# Patient Record
Sex: FEMALE | Race: WHITE | NOT HISPANIC OR LATINO | Employment: OTHER | URBAN - METROPOLITAN AREA
[De-identification: names, ages, dates, MRNs, and addresses within clinical notes are randomized per-mention and may not be internally consistent; named-entity substitution may affect disease eponyms.]

---

## 2021-02-09 ENCOUNTER — OFFICE VISIT (OUTPATIENT)
Dept: ORTHOPEDIC SURGERY | Facility: CLINIC | Age: 56
End: 2021-02-09

## 2021-02-09 VITALS — OXYGEN SATURATION: 97 % | HEIGHT: 61 IN | WEIGHT: 209 LBS | BODY MASS INDEX: 39.46 KG/M2 | HEART RATE: 103 BPM

## 2021-02-09 DIAGNOSIS — M16.12 PRIMARY OSTEOARTHRITIS OF LEFT HIP: Primary | ICD-10-CM

## 2021-02-09 DIAGNOSIS — E66.09 CLASS 2 OBESITY DUE TO EXCESS CALORIES WITHOUT SERIOUS COMORBIDITY WITH BODY MASS INDEX (BMI) OF 39.0 TO 39.9 IN ADULT: ICD-10-CM

## 2021-02-09 DIAGNOSIS — M25.552 LEFT HIP PAIN: ICD-10-CM

## 2021-02-09 PROCEDURE — 99204 OFFICE O/P NEW MOD 45 MIN: CPT | Performed by: ORTHOPAEDIC SURGERY

## 2021-02-09 RX ORDER — CETIRIZINE HYDROCHLORIDE 10 MG/1
10 TABLET ORAL DAILY
COMMUNITY

## 2021-02-09 RX ORDER — VILAZODONE HYDROCHLORIDE 40 MG/1
40 TABLET ORAL DAILY
COMMUNITY
Start: 2020-12-08 | End: 2023-01-12 | Stop reason: SDUPTHER

## 2021-02-09 RX ORDER — ARIPIPRAZOLE 2 MG/1
2 TABLET ORAL DAILY
COMMUNITY
Start: 2020-11-23 | End: 2022-11-09 | Stop reason: SDUPTHER

## 2021-02-09 RX ORDER — LEVOTHYROXINE SODIUM 0.05 MG/1
50 TABLET ORAL DAILY
COMMUNITY
Start: 2020-11-13 | End: 2022-12-06 | Stop reason: SDUPTHER

## 2021-02-09 RX ORDER — MONTELUKAST SODIUM 10 MG/1
10 TABLET ORAL DAILY
COMMUNITY
Start: 2021-01-07 | End: 2022-12-21 | Stop reason: SDUPTHER

## 2021-02-09 RX ORDER — ATORVASTATIN CALCIUM 40 MG/1
40 TABLET, FILM COATED ORAL DAILY
COMMUNITY
Start: 2020-12-29 | End: 2022-11-02 | Stop reason: SDUPTHER

## 2021-02-09 RX ORDER — DULOXETIN HYDROCHLORIDE 60 MG/1
60 CAPSULE, DELAYED RELEASE ORAL DAILY
COMMUNITY
Start: 2021-02-01 | End: 2022-11-02 | Stop reason: SDUPTHER

## 2021-02-09 RX ORDER — PHENOL 1.4 %
30 AEROSOL, SPRAY (ML) MUCOUS MEMBRANE NIGHTLY
COMMUNITY

## 2021-02-09 RX ORDER — OMEPRAZOLE 20 MG/1
20 CAPSULE, DELAYED RELEASE ORAL DAILY
COMMUNITY

## 2021-02-09 RX ORDER — ZOLPIDEM TARTRATE 10 MG/1
10 TABLET ORAL NIGHTLY
COMMUNITY
Start: 2021-01-17 | End: 2022-07-12

## 2021-02-09 RX ORDER — AMLODIPINE BESYLATE 2.5 MG/1
2.5 TABLET ORAL DAILY
COMMUNITY
Start: 2020-11-13 | End: 2023-03-29 | Stop reason: SDUPTHER

## 2021-02-09 RX ORDER — POTASSIUM CHLORIDE 750 MG/1
10 CAPSULE, EXTENDED RELEASE ORAL DAILY
COMMUNITY
Start: 2021-01-10 | End: 2022-11-11 | Stop reason: SDUPTHER

## 2021-02-09 RX ORDER — GUAIFENESIN 600 MG/1
1200 TABLET, EXTENDED RELEASE ORAL 2 TIMES DAILY PRN
COMMUNITY

## 2021-02-09 NOTE — PROGRESS NOTES
Orthopaedic Clinic Note: Hip New Patient    Chief Complaint   Patient presents with   • Left Hip - Pain        HPI    Robina Dumont is a 55 y.o. female who presents with left hip pain for 1 year(s). Onset atraumatic and gradual in nature. Pain is localized to groin and lateral trochanter and is a 4/10 on the pain scale.Pain is described as dull, aching, burning, throbbing, stabbing and shooting. Associated symptoms include pain, popping and stiffness.  The pain is worse with walking and working; resting improve the pain. Previous treatments have included: Tylenol since symptom onset. Although some transient relief was reported with these interventions, these conservative measures have failed and symptoms have persisted. The patient is limited in daily activities and has had a significant decrease in quality of life as a result. She admits to  night sweats.    I have reviewed the following portions of the patient's history:History of Present Illness    History reviewed. No pertinent past medical history.   Past Surgical History:   Procedure Laterality Date   • BLEPHAROPLASTY Bilateral    • CATARACT EXTRACTION, BILATERAL     • CERVIX LESION DESTRUCTION     • FINGER/THUMB ARTHROPLASTY Bilateral    • GASTRIC BYPASS     • KNEE ARTHROSCOPY Right       Family History   Problem Relation Age of Onset   • Heart attack Mother    • Hypertension Mother    • Cancer Mother    • Diabetes Father    • Heart attack Father    • Hypertension Father    • Diabetes Paternal Grandmother      Social History     Socioeconomic History   • Marital status:      Spouse name: Not on file   • Number of children: Not on file   • Years of education: Not on file   • Highest education level: Not on file   Tobacco Use   • Smoking status: Never Smoker   • Smokeless tobacco: Never Used   Substance and Sexual Activity   • Alcohol use: Yes     Comment: rarely   • Drug use: Never   • Sexual activity: Defer      Current Outpatient Medications on File  Prior to Visit   Medication Sig Dispense Refill   • amLODIPine (NORVASC) 2.5 MG tablet Take 2.5 mg by mouth Daily.     • ARIPiprazole (ABILIFY) 2 MG tablet Take 2 mg by mouth Daily.     • atorvastatin (LIPITOR) 40 MG tablet Take 40 mg by mouth Daily.     • cetirizine (zyrTEC) 10 MG tablet Take 10 mg by mouth Daily.     • DULoxetine (CYMBALTA) 60 MG capsule      • fluticasone-salmeterol (ADVAIR) 500-50 MCG/DOSE DISKUS      • guaiFENesin (MUCINEX) 600 MG 12 hr tablet Take 1,200 mg by mouth 2 (Two) Times a Day.     • levothyroxine (SYNTHROID, LEVOTHROID) 50 MCG tablet TAKE 1 TABLET BY MOUTH ONCE DAILY IN THE MORNING ON AN EMPTY STOMACH     • melatonin 5 MG tablet tablet Take 10 mg by mouth.     • montelukast (SINGULAIR) 10 MG tablet Take 10 mg by mouth Daily.     • omeprazole (priLOSEC) 20 MG capsule Take 20 mg by mouth Daily.     • potassium chloride (MICRO-K) 10 MEQ CR capsule Take 10 mEq by mouth Daily.     • Viibryd 40 MG tablet tablet Take 40 mg by mouth Daily.     • zolpidem (AMBIEN) 10 MG tablet        No current facility-administered medications on file prior to visit.       Allergies   Allergen Reactions   • Erythromycin GI Intolerance   • Prednisone Other (See Comments)     Hypertension          Review of Systems   Constitutional: Positive for diaphoresis and fatigue.   HENT: Positive for sinus pressure and sneezing.    Eyes: Positive for itching.   Respiratory: Negative.    Cardiovascular: Negative.    Gastrointestinal: Negative.    Endocrine: Negative.    Genitourinary: Positive for urgency.   Musculoskeletal: Positive for arthralgias and back pain.   Skin: Negative.    Allergic/Immunologic: Positive for environmental allergies.   Neurological: Positive for headaches.   Hematological: Negative.    Psychiatric/Behavioral: Positive for sleep disturbance.        Depression        The patient's Review of Systems was personally reviewed and confirmed as accurate.    The following portions of the patient's  "history were reviewed and updated as appropriate: allergies, current medications, past family history, past medical history, past social history, past surgical history and problem list.    Physical Exam  Pulse 103, height 154.9 cm (61\"), weight 94.8 kg (209 lb), SpO2 97 %.    Body mass index is 39.49 kg/m².    GENERAL APPEARANCE: awake, alert & oriented x 3, in no acute distress, well developed, well nourished and obese  PSYCH: normal affect  LUNGS:  breathing nonlabored  EYES: sclera anicteric  CARDIOVASCULAR: palpable dorsalis pedis, palpable posterior tibial bilaterally. Capillary refill less than 2 seconds  EXTREMITIES: no clubbing, cyanosis  GAIT:  Antalgic           Right Hip Exam:  RANGE OF MOTION:   FLEXION CONTRACTURE: None   FLEXION: 110 degrees   INTERNAL ROTATION: 20 degrees at 90 degrees of flexion   EXTERNAL ROTATION: 40 degrees at 90 degrees of flexion    PAIN WITH HIP MOTION: no  PAIN WITH LOGROLL: no  STINCHFIELD TEST: negative    KNEE EXAM: full knee ROM (0-120 degrees), stable to varus and valgus stress at terminal extension and 30 degrees flexion     STRENGTH:  5/5 hip adduction, abduction, flexion. 5/5 strength knee flexion, extension. 5/5 strength ankle dorsiflexion and plantarflexion.     GREATER TROCHANTER BURSAL PAIN:  no     REFLEXES:   PATELLAR 2+/4   ACHILLES 2+/4    CLONUS: negative  STRAIGHT LEG TEST:   negative    SENSATION TO LIGHT TOUCH:  DEEP PERONEAL/SUPERFICIAL PERONEAL/SURAL/SAPHENOUS/TIBIAL:  intact    EDEMA:   no  ERYTHEMA:  no  WOUNDS/INCISIONS: no overlying skin problems.      Left Hip Exam:   RANGE OF MOTION:   FLEXION CONTRACTURE: None   FLEXION: 110 degrees   INTERNAL ROTATION: 20 degrees at 90 degrees of flexion   EXTERNAL ROTATION: 40 degrees at 90 degrees of flexion    PAIN WITH HIP MOTION: yes  PAIN WITH LOGROLL: no  STINCHFIELD TEST: Positive    KNEE EXAM: full knee ROM (0-120 degrees), stable to varus and valgus stress at terminal extension and 30 degrees flexion "     STRENGTH:  5/5 hip adduction, abduction, flexion. 5/5 strength knee flexion, extension. 5/5 strength ankle dorsiflexion and plantarflexion.     GREATER TROCHANTER BURSAL PAIN:  no     REFLEXES:   PATELLAR 2+/4   ACHILLES 2+/4    CLONUS: negative  STRAIGHT LEG TEST:   negative    SENSATION TO LIGHT TOUCH:  DEEP PERONEAL/SUPERFICIAL PERONEAL/SURAL/SAPHENOUS/TIBIAL:  intact    EDEMA:   no  ERYTHEMA:  no  WOUNDS/INCISIONS: no overlying skin problems.      ------------------------------------------------------------------    LEG LENGTHS:  equal  _____________________________________________________  _____________________________________________________    RADIOGRAPHIC FINDINGS:   Indication: Left hip pain    Comparison: No prior xrays are available for comparison    AP pelvis, hip 2 views: Right: mild joint space narrowing, minimal osteophyte formation; Left: advanced, end-stage osteoarthritis with bone on bone articulation, subchondral sclerosis, and subchondral cysts, there are marginal osteophytes visualized at the femoral head-neck junction and acetabular margins    Assessment/Plan:   Diagnosis Plan   1. Primary osteoarthritis of left hip  [place] Guide For Pain Meds Inj   2. Left hip pain  XR Hip With or Without Pelvis 2 - 3 View Left   3. Class 2 obesity due to excess calories without serious comorbidity with body mass index (BMI) of 39.0 to 39.9 in adult       Patient has left hip arthritis.  I discussed treatment options.  She is also overweight which is contributing to her joint pain.  Given the preservation of her joint range of motion as well as her relative young age, I discussed proceeding to joint replacement versus attempting cortisone injection to the hip joint.  She is agreeable to the hip joint cortisone injection today.  I explained that this can provide some temporary pain relief and in the interval she can work on weight loss to decrease joint reaction forces.  Weight loss will also improve her  perioperative risks.  She is agreeable to this plan.  She will follow-up in 2 months.    Patient has an elevated BMI greater than 40.  This places the patient at increased risk for perioperative complications as well as increased risk of accelerating the degenerative processes within the joint.  As a result, surgical intervention will be deferred until the patient can achieve a BMI less than 40.  In the interval, the patient has been instructed on weight loss avenues including diet, portion control, calorie restriction, low/no impact exercise, referral to weight loss management and/or bariatric surgery.  It was explained that weight loss can improve joint pain alone by decreasing the joint reaction forces.  For every pound of weight change, the knee and hip joints see a 4 to 5 fold multiplier affect.  Given these options, the patient will proceed with diet and low back exercise.      Jeremiah Alcala MD  02/09/21  10:42 EST

## 2021-02-23 ENCOUNTER — HOSPITAL ENCOUNTER (OUTPATIENT)
Dept: GENERAL RADIOLOGY | Facility: HOSPITAL | Age: 56
Discharge: HOME | End: 2021-02-23
Admitting: ORTHOPAEDIC SURGERY

## 2021-02-23 DIAGNOSIS — M16.12 PRIMARY OSTEOARTHRITIS OF LEFT HIP: ICD-10-CM

## 2021-02-23 PROCEDURE — 25010000002 TRIAMCINOLONE PER 10 MG: Performed by: ORTHOPAEDIC SURGERY

## 2021-02-23 PROCEDURE — 77002 NEEDLE LOCALIZATION BY XRAY: CPT

## 2021-02-23 PROCEDURE — 25010000003 LIDOCAINE 1 % SOLUTION: Performed by: RADIOLOGY

## 2021-02-23 PROCEDURE — 25010000002 IOPAMIDOL 61 % SOLUTION: Performed by: ORTHOPAEDIC SURGERY

## 2021-02-23 RX ORDER — LIDOCAINE HYDROCHLORIDE 10 MG/ML
10 INJECTION, SOLUTION INFILTRATION; PERINEURAL ONCE
Status: COMPLETED | OUTPATIENT
Start: 2021-02-23 | End: 2021-02-23

## 2021-02-23 RX ORDER — TRIAMCINOLONE ACETONIDE 40 MG/ML
80 INJECTION, SUSPENSION INTRA-ARTICULAR; INTRAMUSCULAR ONCE
Status: COMPLETED | OUTPATIENT
Start: 2021-02-23 | End: 2021-02-23

## 2021-02-23 RX ORDER — BUPIVACAINE HYDROCHLORIDE 2.5 MG/ML
3 INJECTION, SOLUTION EPIDURAL; INFILTRATION; INTRACAUDAL ONCE
Status: COMPLETED | OUTPATIENT
Start: 2021-02-23 | End: 2021-02-23

## 2021-02-23 RX ORDER — LIDOCAINE HYDROCHLORIDE 10 MG/ML
3 INJECTION, SOLUTION EPIDURAL; INFILTRATION; INTRACAUDAL; PERINEURAL ONCE
Status: COMPLETED | OUTPATIENT
Start: 2021-02-23 | End: 2021-02-23

## 2021-02-23 RX ADMIN — BUPIVACAINE HYDROCHLORIDE 3 ML: 2.5 INJECTION, SOLUTION EPIDURAL; INFILTRATION; INTRACAUDAL; PERINEURAL at 13:38

## 2021-02-23 RX ADMIN — IOPAMIDOL 15 ML: 612 INJECTION, SOLUTION INTRAVENOUS at 13:40

## 2021-02-23 RX ADMIN — LIDOCAINE HYDROCHLORIDE 10 ML: 10 INJECTION, SOLUTION INFILTRATION; PERINEURAL at 13:39

## 2021-02-23 RX ADMIN — TRIAMCINOLONE ACETONIDE 80 MG: 40 INJECTION, SUSPENSION INTRA-ARTICULAR; INTRAMUSCULAR at 13:39

## 2021-02-23 RX ADMIN — LIDOCAINE HYDROCHLORIDE 3 ML: 10 INJECTION, SOLUTION EPIDURAL; INFILTRATION; INTRACAUDAL; PERINEURAL at 13:39

## 2021-03-31 ENCOUNTER — TELEPHONE (OUTPATIENT)
Dept: ORTHOPEDIC SURGERY | Facility: CLINIC | Age: 56
End: 2021-03-31

## 2021-04-01 NOTE — TELEPHONE ENCOUNTER
SPOKE TO PATIENT, MESSAGE BELOW WAS RELAYED TO PATIENT:    Typically do not recommend cortisone injections any more frequent than every 3 months.  Tylenol, ibuprofen can be utilized to help with pain in the interval.     She ultimately needs a total hip arthroplasty.  That can be scheduled anytime.     PATIENT UNDERSTOOD AND WILL COME BACK FOR April APPOINTMENT TO DISCUSS.

## 2021-04-13 ENCOUNTER — OFFICE VISIT (OUTPATIENT)
Dept: ORTHOPEDIC SURGERY | Facility: CLINIC | Age: 56
End: 2021-04-13

## 2021-04-13 VITALS
WEIGHT: 208.8 LBS | BODY MASS INDEX: 39.42 KG/M2 | HEIGHT: 61 IN | SYSTOLIC BLOOD PRESSURE: 128 MMHG | DIASTOLIC BLOOD PRESSURE: 86 MMHG

## 2021-04-13 DIAGNOSIS — E66.09 CLASS 2 OBESITY DUE TO EXCESS CALORIES WITHOUT SERIOUS COMORBIDITY WITH BODY MASS INDEX (BMI) OF 39.0 TO 39.9 IN ADULT: ICD-10-CM

## 2021-04-13 DIAGNOSIS — M16.12 PRIMARY OSTEOARTHRITIS OF LEFT HIP: Primary | ICD-10-CM

## 2021-04-13 PROCEDURE — 99214 OFFICE O/P EST MOD 30 MIN: CPT | Performed by: ORTHOPAEDIC SURGERY

## 2021-04-13 RX ORDER — LOSARTAN POTASSIUM AND HYDROCHLOROTHIAZIDE 25; 100 MG/1; MG/1
1 TABLET ORAL DAILY
COMMUNITY
Start: 2021-02-25 | End: 2022-11-21 | Stop reason: SDUPTHER

## 2021-04-13 RX ORDER — ALBUTEROL SULFATE 90 UG/1
2 POWDER, METERED RESPIRATORY (INHALATION) EVERY 6 HOURS PRN
COMMUNITY

## 2021-04-13 NOTE — PROGRESS NOTES
Orthopaedic Clinic Note: Hip Established Patient    Chief Complaint   Patient presents with   • Follow-up     2 month follow up; Primary osteoarthritis of left hip-intra-articular hip injection under fluro given on 2/23/21        HPI    It has been 2  month(s) since Ms. Dumont's last visit. She returns to clinic today for follow-up left hip arthritis.  She underwent intra-articular injection 2 months ago.  The injection worked well for about a month with nearly 100% pain relief.  Her pain is gradually returned.  She now rates her pain a 4/10 on the pain scale.  She is ambulating with no assistive device.  She is complaining of pain localized to the groin that is worse with weightbearing activities.  Denies fevers chills or constitutional symptoms.  Overall she was doing better after the injection but now she is doing worse and is requesting further intervention.  She remains overweight with a BMI of 39.4.    History reviewed. No pertinent past medical history.   Past Surgical History:   Procedure Laterality Date   • BLEPHAROPLASTY Bilateral    • CATARACT EXTRACTION, BILATERAL     • CERVIX LESION DESTRUCTION     • FINGER/THUMB ARTHROPLASTY Bilateral    • GASTRIC BYPASS     • KNEE ARTHROSCOPY Right       Family History   Problem Relation Age of Onset   • Heart attack Mother    • Hypertension Mother    • Cancer Mother    • Diabetes Father    • Heart attack Father    • Hypertension Father    • Diabetes Paternal Grandmother      Social History     Socioeconomic History   • Marital status:      Spouse name: Not on file   • Number of children: Not on file   • Years of education: Not on file   • Highest education level: Not on file   Tobacco Use   • Smoking status: Never Smoker   • Smokeless tobacco: Never Used   Vaping Use   • Vaping Use: Never used   Substance and Sexual Activity   • Alcohol use: Yes     Comment: rarely   • Drug use: Never   • Sexual activity: Defer      Current Outpatient Medications on File Prior  to Visit   Medication Sig Dispense Refill   • albuterol (ProAir RespiClick) 108 (90 Base) MCG/ACT inhaler ProAir RespiClick 90 mcg/actuation breath activated     • amLODIPine (NORVASC) 2.5 MG tablet Take 2.5 mg by mouth Daily.     • ARIPiprazole (ABILIFY) 2 MG tablet Take 2 mg by mouth Daily.     • atorvastatin (LIPITOR) 40 MG tablet Take 40 mg by mouth Daily.     • cetirizine (zyrTEC) 10 MG tablet Take 10 mg by mouth Daily.     • DULoxetine (CYMBALTA) 60 MG capsule      • fluticasone-salmeterol (ADVAIR) 500-50 MCG/DOSE DISKUS      • guaiFENesin (MUCINEX) 600 MG 12 hr tablet Take 1,200 mg by mouth 2 (Two) Times a Day.     • levothyroxine (SYNTHROID, LEVOTHROID) 50 MCG tablet TAKE 1 TABLET BY MOUTH ONCE DAILY IN THE MORNING ON AN EMPTY STOMACH     • losartan-hydrochlorothiazide (HYZAAR) 100-25 MG per tablet Take 1 tablet by mouth Daily.     • melatonin 5 MG tablet tablet Take 10 mg by mouth.     • montelukast (SINGULAIR) 10 MG tablet Take 10 mg by mouth Daily.     • omeprazole (priLOSEC) 20 MG capsule Take 20 mg by mouth Daily.     • potassium chloride (MICRO-K) 10 MEQ CR capsule Take 10 mEq by mouth Daily.     • Viibryd 40 MG tablet tablet Take 40 mg by mouth Daily.     • zolpidem (AMBIEN) 10 MG tablet        No current facility-administered medications on file prior to visit.      Allergies   Allergen Reactions   • Erythromycin GI Intolerance   • Prednisone Other (See Comments)     Hypertension          Review of Systems   Constitutional: Negative.    HENT: Negative.    Eyes: Negative.    Respiratory: Negative.    Cardiovascular: Negative.    Gastrointestinal: Negative.    Endocrine: Negative.    Genitourinary: Negative.    Musculoskeletal: Positive for arthralgias.   Skin: Negative.    Allergic/Immunologic: Negative.    Neurological: Negative.    Hematological: Negative.    Psychiatric/Behavioral: Negative.         The patient's Review of Systems was personally reviewed and confirmed as accurate.    Physical  "Exam  Blood pressure 128/86, height 154.9 cm (60.98\"), weight 94.7 kg (208 lb 12.8 oz).    Body mass index is 39.47 kg/m².    GENERAL APPEARANCE: awake, alert, oriented, in no acute distress, well developed, well nourished and obese  LUNGS:  breathing nonlabored  EXTREMITIES: no clubbing, cyanosis  PERIPHERAL PULSES: palpable dorsalis pedis and posterior tibial pulses bilaterally.    GAIT:  Antalgic            Hip Exam:  Left    RANGE OF MOTION:  EXTENSION/FLEXION:  normal (0-110 degrees)  IR (at 90 degrees of flexion):  10  ER (at 90 degrees of flexion):  35  PAIN WITH HIP MOTION:  yes, Localized to groin  PAIN WITH LOGROLL:  no     [facility] positive    STRENGTH:  ABDUCTOR:  5/5  ADDUCTOR:  5/5  HIP FLEXION:  5/5    GREATER TROCHANTER BURSAL PAIN:  yes    SENSATION TO LIGHT TOUCH:  DEEP PERONEAL/SUPERFICIAL PERONEAL/SURAL/SAPHENOUS/TIBIAL:   intact    EDEMA:  no  ERYTHEMA:  no  WOUNDS/INCISIONS:   no  _________________________________________________________________  _________________________________________________________________    RADIOGRAPHIC FINDINGS:   No new imaging today.  Prior imaging from 2/9/2020 one of the left hip demonstrates moderate to severe joint space narrowing with periarticular osteophyte formation of the left hip    Assessment/Plan:   Diagnosis Plan   1. Primary osteoarthritis of left hip  traMADol (ULTRAM) 50 MG tablet   2. Class 2 obesity due to excess calories without serious comorbidity with body mass index (BMI) of 39.0 to 39.9 in adult       I discussed with the patient that given the significant improvement with the intra-articular injection, the source of her pain is hip arthritis.  She is not interested in surgical intervention at this time as she is scheduled to go on vacation in May.  I discussed alternative treatment options with her.  I will prescribe her tramadol.  She will follow-up in 1 month for repeat evaluation.  At that time we will likely schedule her for hip " replacement.    Patient has an elevated BMI. The patient has been instructed on various weight loss avenues including diet, portion control, calorie restriction, low/no impact exercise, referral to weight loss management and/or bariatric surgery.  It was explained that weight loss can improve joint pain alone by decreasing the joint reaction forces.  For every pound of weight change, the knee and hip joints see a 4 to 5 fold change in pressure.  Given these options, the patient will proceed with low calorie diet and low impact exercise.    Jeremiah Alcala MD  04/14/21  13:46 EDT

## 2021-04-14 ENCOUNTER — TELEPHONE (OUTPATIENT)
Dept: ORTHOPEDIC SURGERY | Facility: CLINIC | Age: 56
End: 2021-04-14

## 2021-04-14 RX ORDER — TRAMADOL HYDROCHLORIDE 50 MG/1
50 TABLET ORAL EVERY 6 HOURS PRN
Qty: 30 TABLET | Refills: 0 | Status: SHIPPED | OUTPATIENT
Start: 2021-04-14 | End: 2021-06-23 | Stop reason: HOSPADM

## 2021-04-14 NOTE — TELEPHONE ENCOUNTER
I apologize.  The prescription has been sent in.  I forgot to do it yesterday and failed to send it in.

## 2021-04-14 NOTE — TELEPHONE ENCOUNTER
PATIENT CALLED STATING THAT PROVIDER WAS SUPPOSED TO CALL IN HER TRAMADOL AND PHARMACY HAS NOT RECEIVED IT. PATIENT USES PHARMACY ON FILE AND CAN BE REACHED [phone].

## 2021-04-21 ENCOUNTER — TELEPHONE (OUTPATIENT)
Dept: ORTHOPEDIC SURGERY | Facility: CLINIC | Age: 56
End: 2021-04-21

## 2021-04-21 NOTE — TELEPHONE ENCOUNTER
I spoke with Ms. Dumont and told her we have been working on the PA, but the insurance has requested additional information. I told her we have submitted the information and are waiting to hear back about the status. She understood.    GEOFFREY

## 2021-04-22 ENCOUNTER — TELEPHONE (OUTPATIENT)
Dept: ORTHOPEDIC SURGERY | Facility: CLINIC | Age: 56
End: 2021-04-22

## 2021-04-22 NOTE — TELEPHONE ENCOUNTER
After sending the requested paperwork for the prior authorization, the prescription for Tramadol was denied.  I called the patient to let her that her insurance is requiring a urine analysis, a jasson showing previous drug use, and proof over the counter medication did not provide the needed relief.  She said her PCP does an UA every months due to her taking Ambien.  I recommended she try to get the tramadol through them since they might have the needed documentation her insurance is requesting.  The patient understood.

## 2021-05-13 ENCOUNTER — OFFICE VISIT (OUTPATIENT)
Dept: ORTHOPEDIC SURGERY | Facility: CLINIC | Age: 56
End: 2021-05-13

## 2021-05-13 VITALS
WEIGHT: 212.8 LBS | DIASTOLIC BLOOD PRESSURE: 79 MMHG | SYSTOLIC BLOOD PRESSURE: 140 MMHG | HEART RATE: 86 BPM | BODY MASS INDEX: 40.17 KG/M2 | HEIGHT: 61 IN

## 2021-05-13 DIAGNOSIS — M16.12 PRIMARY OSTEOARTHRITIS OF LEFT HIP: Primary | ICD-10-CM

## 2021-05-13 DIAGNOSIS — E66.01 CLASS 3 SEVERE OBESITY DUE TO EXCESS CALORIES WITHOUT SERIOUS COMORBIDITY WITH BODY MASS INDEX (BMI) OF 40.0 TO 44.9 IN ADULT (HCC): ICD-10-CM

## 2021-05-13 PROCEDURE — 99213 OFFICE O/P EST LOW 20 MIN: CPT | Performed by: ORTHOPAEDIC SURGERY

## 2021-05-13 NOTE — PROGRESS NOTES
Orthopaedic Clinic Note: Hip Established Patient    Chief Complaint   Patient presents with   • Follow-up     4 week follow up; Primary osteoarthritis of left hip         HPI    It has been 4  week(s) since Ms. Dumont's last visit. She returns to clinic today for follow-up osteoarthritis left hip.  She was last seen a month ago and underwent intra-articular injection back in February.  The injection provided 100% relief for short period of time.  She was returning today to discuss proceeding to total hip arthroplasty.  She rates her pain 4/10 on the pain scale.  Localized to groin lateral trochanter region.  She is ambulatory with no assistive device.  In the interval since her last visit, her weight has increased from BMI 39.47 to 40.23.    History reviewed. No pertinent past medical history.   Past Surgical History:   Procedure Laterality Date   • BLEPHAROPLASTY Bilateral    • CATARACT EXTRACTION, BILATERAL     • CERVIX LESION DESTRUCTION     • FINGER/THUMB ARTHROPLASTY Bilateral    • GASTRIC BYPASS     • KNEE ARTHROSCOPY Right       Family History   Problem Relation Age of Onset   • Heart attack Mother    • Hypertension Mother    • Cancer Mother    • Diabetes Father    • Heart attack Father    • Hypertension Father    • Diabetes Paternal Grandmother      Social History     Socioeconomic History   • Marital status:      Spouse name: Not on file   • Number of children: Not on file   • Years of education: Not on file   • Highest education level: Not on file   Tobacco Use   • Smoking status: Never Smoker   • Smokeless tobacco: Never Used   Vaping Use   • Vaping Use: Never used   Substance and Sexual Activity   • Alcohol use: Yes     Comment: rarely   • Drug use: Never   • Sexual activity: Defer      Current Outpatient Medications on File Prior to Visit   Medication Sig Dispense Refill   • albuterol (ProAir RespiClick) 108 (90 Base) MCG/ACT inhaler ProAir RespiClick 90 mcg/actuation breath activated     •  amLODIPine (NORVASC) 2.5 MG tablet Take 2.5 mg by mouth Daily.     • ARIPiprazole (ABILIFY) 2 MG tablet Take 2 mg by mouth Daily.     • atorvastatin (LIPITOR) 40 MG tablet Take 40 mg by mouth Daily.     • cetirizine (zyrTEC) 10 MG tablet Take 10 mg by mouth Daily.     • DULoxetine (CYMBALTA) 60 MG capsule      • fluticasone-salmeterol (ADVAIR) 500-50 MCG/DOSE DISKUS      • guaiFENesin (MUCINEX) 600 MG 12 hr tablet Take 1,200 mg by mouth 2 (Two) Times a Day.     • levothyroxine (SYNTHROID, LEVOTHROID) 50 MCG tablet TAKE 1 TABLET BY MOUTH ONCE DAILY IN THE MORNING ON AN EMPTY STOMACH     • losartan-hydrochlorothiazide (HYZAAR) 100-25 MG per tablet Take 1 tablet by mouth Daily.     • melatonin 5 MG tablet tablet Take 10 mg by mouth.     • montelukast (SINGULAIR) 10 MG tablet Take 10 mg by mouth Daily.     • omeprazole (priLOSEC) 20 MG capsule Take 20 mg by mouth Daily.     • potassium chloride (MICRO-K) 10 MEQ CR capsule Take 10 mEq by mouth Daily.     • Viibryd 40 MG tablet tablet Take 40 mg by mouth Daily.     • zolpidem (AMBIEN) 10 MG tablet      • traMADol (ULTRAM) 50 MG tablet Take 1 tablet by mouth Every 6 (Six) Hours As Needed for Moderate Pain . (Patient taking differently: Take 50 mg by mouth Every 6 (Six) Hours As Needed for Moderate Pain . Insurance would not cover; going to try to go through PCP) 30 tablet 0     No current facility-administered medications on file prior to visit.      Allergies   Allergen Reactions   • Erythromycin GI Intolerance   • Prednisone Other (See Comments)     Hypertension          Review of Systems   Constitutional: Negative.    HENT: Negative.    Eyes: Negative.    Respiratory: Negative.    Cardiovascular: Negative.    Gastrointestinal: Negative.    Endocrine: Negative.    Genitourinary: Negative.    Musculoskeletal: Positive for arthralgias.   Skin: Negative.    Allergic/Immunologic: Negative.    Neurological: Negative.    Hematological: Negative.    Psychiatric/Behavioral:  "Negative.         The patient's Review of Systems was personally reviewed and confirmed as accurate.    Physical Exam  Blood pressure 140/79, pulse 86, height 154.9 cm (60.98\"), weight 96.5 kg (212 lb 12.8 oz).    Body mass index is 40.23 kg/m².    GENERAL APPEARANCE: awake, alert, oriented, in no acute distress, well developed, well nourished and obese  LUNGS:  breathing nonlabored  EXTREMITIES: no clubbing, cyanosis  PERIPHERAL PULSES: palpable dorsalis pedis and posterior tibial pulses bilaterally.    GAIT:  Antalgic            Hip Exam:  Left     RANGE OF MOTION:  EXTENSION/FLEXION:  normal (0-110 degrees)  IR (at 90 degrees of flexion):  10  ER (at 90 degrees of flexion):  35  PAIN WITH HIP MOTION:  yes, Localized to groin  PAIN WITH LOGROLL:  no      STINCHFIELD TEST: positive     STRENGTH:  ABDUCTOR:    5/5  ADDUCTOR:  5/5  HIP FLEXION:  5/5     GREATER TROCHANTER BURSAL PAIN:  yes    SENSATION TO LIGHT TOUCH:  DEEP PERONEAL/SUPERFICIAL PERONEAL/SURAL/SAPHENOUS/TIBIAL:   intact    EDEMA:  no  ERYTHEMA:  no  WOUNDS/INCISIONS:   no  _________________________________________________________________  _________________________________________________________________    RADIOGRAPHIC FINDINGS:   No new imaging today    Assessment/Plan:   Diagnosis Plan   1. Primary osteoarthritis of left hip     2. Class 3 severe obesity due to excess calories without serious comorbidity with body mass index (BMI) of 40.0 to 44.9 in adult (CMS/[facility])       Patient is not a candidate for total joint arthroplasty until she has her BMI below 40.  In the interval since her last visit, she has gained 4 to 5 pounds which is placed her above the BMI 40 threshold.  We had an extensive discussion her last visit about the importance of weight loss to decrease joint reaction forces and optimize her perioperative outcome.  Unfortunately, she has not followed through on this weight loss recommendation is actually gained weight.  As a result, she " is not a candidate for total joint arthroplasty until her weight is below BMI 40.  She will follow-up in 1 month for repeat evaluation.  If her BMI is below 40 at that time, we will consider scheduling total hip arthroplasty.  In the interval she is to take over-the-counter anti-inflammatories as needed.    Patient has an elevated BMI greater than 40.  This places the patient at increased risk for perioperative complications as well as increased risk of accelerating the degenerative processes within the joint.  As a result, surgical intervention will be deferred until the patient can achieve a BMI less than 40.  In the interval, the patient has been instructed on weight loss avenues including diet, portion control, calorie restriction, low/no impact exercise, referral to weight loss management and/or bariatric surgery.  It was explained that weight loss can improve joint pain alone by decreasing the joint reaction forces.  For every pound of weight change, the knee and hip joints see a 4 to 5 fold change in pressure.  Given these options, the patient will proceed with low calorie diet and low impact exercise.    Jeremiah Alcala MD  05/13/21  12:24 EDT

## 2021-06-10 ENCOUNTER — PRE-ADMISSION TESTING (OUTPATIENT)
Dept: PREADMISSION TESTING | Facility: HOSPITAL | Age: 56
End: 2021-06-10

## 2021-06-10 ENCOUNTER — OFFICE VISIT (OUTPATIENT)
Dept: ORTHOPEDIC SURGERY | Facility: CLINIC | Age: 56
End: 2021-06-10

## 2021-06-10 VITALS
BODY MASS INDEX: 39.08 KG/M2 | HEART RATE: 91 BPM | WEIGHT: 207 LBS | SYSTOLIC BLOOD PRESSURE: 127 MMHG | DIASTOLIC BLOOD PRESSURE: 79 MMHG | HEIGHT: 61 IN

## 2021-06-10 VITALS — WEIGHT: 209.4 LBS | BODY MASS INDEX: 39.53 KG/M2 | HEIGHT: 61 IN

## 2021-06-10 DIAGNOSIS — E66.09 CLASS 2 OBESITY DUE TO EXCESS CALORIES WITHOUT SERIOUS COMORBIDITY WITH BODY MASS INDEX (BMI) OF 39.0 TO 39.9 IN ADULT: ICD-10-CM

## 2021-06-10 DIAGNOSIS — M16.12 PRIMARY OSTEOARTHRITIS OF LEFT HIP: ICD-10-CM

## 2021-06-10 DIAGNOSIS — M16.12 PRIMARY OSTEOARTHRITIS OF LEFT HIP: Primary | ICD-10-CM

## 2021-06-10 LAB
ANION GAP SERPL CALCULATED.3IONS-SCNC: 14 MMOL/L (ref 5–15)
APTT PPP: 29.8 SECONDS (ref 22–39)
BASOPHILS # BLD AUTO: 0.05 10*3/MM3 (ref 0–0.2)
BASOPHILS NFR BLD AUTO: 0.4 % (ref 0–1.5)
BUN SERPL-MCNC: 22 MG/DL (ref 6–20)
BUN/CREAT SERPL: 25.3 (ref 7–25)
CALCIUM SPEC-SCNC: 10.3 MG/DL (ref 8.6–10.5)
CHLORIDE SERPL-SCNC: 96 MMOL/L (ref 98–107)
CO2 SERPL-SCNC: 28 MMOL/L (ref 22–29)
CREAT SERPL-MCNC: 0.87 MG/DL (ref 0.57–1)
DEPRECATED RDW RBC AUTO: 43.2 FL (ref 37–54)
EOSINOPHIL # BLD AUTO: 0.04 10*3/MM3 (ref 0–0.4)
EOSINOPHIL NFR BLD AUTO: 0.3 % (ref 0.3–6.2)
ERYTHROCYTE [DISTWIDTH] IN BLOOD BY AUTOMATED COUNT: 12.6 % (ref 12.3–15.4)
GFR SERPL CREATININE-BSD FRML MDRD: 67 ML/MIN/1.73
GLUCOSE SERPL-MCNC: 115 MG/DL (ref 65–99)
HBA1C MFR BLD: 6.6 % (ref 4.8–5.6)
HCT VFR BLD AUTO: 41.1 % (ref 34–46.6)
HGB BLD-MCNC: 13.4 G/DL (ref 12–15.9)
IMM GRANULOCYTES # BLD AUTO: 0.04 10*3/MM3 (ref 0–0.05)
IMM GRANULOCYTES NFR BLD AUTO: 0.3 % (ref 0–0.5)
INR PPP: 1.02 (ref 0.85–1.16)
LYMPHOCYTES # BLD AUTO: 2.24 10*3/MM3 (ref 0.7–3.1)
LYMPHOCYTES NFR BLD AUTO: 17.9 % (ref 19.6–45.3)
MCH RBC QN AUTO: 30.5 PG (ref 26.6–33)
MCHC RBC AUTO-ENTMCNC: 32.6 G/DL (ref 31.5–35.7)
MCV RBC AUTO: 93.4 FL (ref 79–97)
MONOCYTES # BLD AUTO: 0.81 10*3/MM3 (ref 0.1–0.9)
MONOCYTES NFR BLD AUTO: 6.5 % (ref 5–12)
NEUTROPHILS NFR BLD AUTO: 74.6 % (ref 42.7–76)
NEUTROPHILS NFR BLD AUTO: 9.35 10*3/MM3 (ref 1.7–7)
NRBC BLD AUTO-RTO: 0 /100 WBC (ref 0–0.2)
PLATELET # BLD AUTO: 355 10*3/MM3 (ref 140–450)
PMV BLD AUTO: 9.8 FL (ref 6–12)
POTASSIUM SERPL-SCNC: 3.9 MMOL/L (ref 3.5–5.2)
PROTHROMBIN TIME: 13.1 SECONDS (ref 11.4–14.4)
QT INTERVAL: 370 MS
QTC INTERVAL: 437 MS
RBC # BLD AUTO: 4.4 10*6/MM3 (ref 3.77–5.28)
SODIUM SERPL-SCNC: 138 MMOL/L (ref 136–145)
WBC # BLD AUTO: 12.53 10*3/MM3 (ref 3.4–10.8)

## 2021-06-10 PROCEDURE — 80048 BASIC METABOLIC PNL TOTAL CA: CPT

## 2021-06-10 PROCEDURE — 83036 HEMOGLOBIN GLYCOSYLATED A1C: CPT

## 2021-06-10 PROCEDURE — 93010 ELECTROCARDIOGRAM REPORT: CPT | Performed by: INTERNAL MEDICINE

## 2021-06-10 PROCEDURE — 85610 PROTHROMBIN TIME: CPT

## 2021-06-10 PROCEDURE — 93005 ELECTROCARDIOGRAM TRACING: CPT

## 2021-06-10 PROCEDURE — G0480 DRUG TEST DEF 1-7 CLASSES: HCPCS

## 2021-06-10 PROCEDURE — 99214 OFFICE O/P EST MOD 30 MIN: CPT | Performed by: ORTHOPAEDIC SURGERY

## 2021-06-10 PROCEDURE — 85025 COMPLETE CBC W/AUTO DIFF WBC: CPT

## 2021-06-10 PROCEDURE — 85730 THROMBOPLASTIN TIME PARTIAL: CPT

## 2021-06-10 PROCEDURE — 36415 COLL VENOUS BLD VENIPUNCTURE: CPT

## 2021-06-10 RX ORDER — PREGABALIN 75 MG/1
75 CAPSULE ORAL ONCE
Status: CANCELLED | OUTPATIENT
Start: 2021-06-10 | End: 2021-06-10

## 2021-06-10 RX ORDER — BUTALBITAL, ASPIRIN, AND CAFFEINE 325; 50; 40 MG/1; MG/1; MG/1
1 CAPSULE ORAL EVERY 8 HOURS PRN
COMMUNITY
Start: 2021-05-17 | End: 2022-11-02

## 2021-06-10 RX ORDER — MELOXICAM 7.5 MG/1
15 TABLET ORAL ONCE
Status: CANCELLED | OUTPATIENT
Start: 2021-06-10 | End: 2021-06-10

## 2021-06-10 RX ORDER — LIRAGLUTIDE 6 MG/ML
0.6 INJECTION SUBCUTANEOUS DAILY
COMMUNITY
Start: 2021-06-01 | End: 2022-12-22

## 2021-06-10 RX ORDER — FLURBIPROFEN SODIUM 0.3 MG/ML
SOLUTION/ DROPS OPHTHALMIC
COMMUNITY
Start: 2021-06-08 | End: 2023-03-01 | Stop reason: ALTCHOICE

## 2021-06-10 RX ORDER — ACETAMINOPHEN 325 MG/1
1000 TABLET ORAL ONCE
Status: CANCELLED | OUTPATIENT
Start: 2021-06-10 | End: 2021-06-10

## 2021-06-10 ASSESSMENT — HOOS JR
HOOS JR SCORE: 18
HOOS JR SCORE: 32.735

## 2021-06-10 NOTE — PROGRESS NOTES
Orthopaedic Clinic Note: Hip Established Patient    Chief Complaint   Patient presents with   • Left Hip - Follow-up     4 week f/u,Primary osteoarthritis of left hip         HPI    It has been 4  week(s) since Ms. Dumont's last visit. She returns to clinic today for Primary osteoarthritis of left hip . She rates her pain a 5/10 on the pain scale and is currently taking nothing for pain.  At her last visit, her BMI was above 40 and therefore she was not able to undergo surgical intervention.  She returns to clinic today stating that she has lost weight and is ready to proceed to surgery.  She is ambulating with no assistive device.  She is complaining of severe pain in the groin radiating down the leg to the knee.  Overall she is doing worse.  Her BMI is now decreased 39.13.    Past Medical History:   Diagnosis Date   • Anxiety    • Asthma    • Depression    • Migraines       Past Surgical History:   Procedure Laterality Date   • BLEPHAROPLASTY Bilateral    • CATARACT EXTRACTION, BILATERAL     • CERVIX LESION DESTRUCTION     • FINGER/THUMB ARTHROPLASTY Bilateral    • GASTRIC BYPASS     • KNEE ARTHROSCOPY Right       Family History   Problem Relation Age of Onset   • Heart attack Mother    • Hypertension Mother    • Cancer Mother    • Diabetes Father    • Heart attack Father    • Hypertension Father    • Diabetes Paternal Grandmother      Social History     Socioeconomic History   • Marital status:      Spouse name: Not on file   • Number of children: Not on file   • Years of education: Not on file   • Highest education level: Not on file   Tobacco Use   • Smoking status: Never Smoker   • Smokeless tobacco: Never Used   Vaping Use   • Vaping Use: Never used   Substance and Sexual Activity   • Alcohol use: Yes     Comment: rarely   • Drug use: Never   • Sexual activity: Defer      Current Outpatient Medications on File Prior to Visit   Medication Sig Dispense Refill   • albuterol (ProAir RespiClick) 108 (90  Base) MCG/ACT inhaler ProAir RespiClick 90 mcg/actuation breath activated     • amLODIPine (NORVASC) 2.5 MG tablet Take 2.5 mg by mouth Daily.     • ARIPiprazole (ABILIFY) 2 MG tablet Take 2 mg by mouth Daily.     • atorvastatin (LIPITOR) 40 MG tablet Take 40 mg by mouth Daily.     • B-D UF III MINI PEN NEEDLES 31G X 5 MM misc USE 1 ONCE DAILY WITH VICTOZA INJECTION     • butalbital-aspirin-caffeine (FIORINAL) -40 MG capsule TAKE 1 CAPSULE BY MOUTH ONCE DAILY AS NEEDED FOR MIGRAINE HEADACHE     • cetirizine (zyrTEC) 10 MG tablet Take 10 mg by mouth Daily.     • DULoxetine (CYMBALTA) 60 MG capsule      • fluticasone-salmeterol (ADVAIR) 500-50 MCG/DOSE DISKUS      • guaiFENesin (MUCINEX) 600 MG 12 hr tablet Take 1,200 mg by mouth 2 (Two) Times a Day.     • levothyroxine (SYNTHROID, LEVOTHROID) 50 MCG tablet TAKE 1 TABLET BY MOUTH ONCE DAILY IN THE MORNING ON AN EMPTY STOMACH     • losartan-hydrochlorothiazide (HYZAAR) 100-25 MG per tablet Take 1 tablet by mouth Daily.     • melatonin 5 MG tablet tablet Take 10 mg by mouth.     • montelukast (SINGULAIR) 10 MG tablet Take 10 mg by mouth Daily.     • omeprazole (priLOSEC) 20 MG capsule Take 20 mg by mouth Daily.     • potassium chloride (MICRO-K) 10 MEQ CR capsule Take 10 mEq by mouth Daily.     • traMADol (ULTRAM) 50 MG tablet Take 1 tablet by mouth Every 6 (Six) Hours As Needed for Moderate Pain . (Patient taking differently: Take 50 mg by mouth Every 6 (Six) Hours As Needed for Moderate Pain . Insurance would not cover; going to try to go through PCP) 30 tablet 0   • Victoza 18 MG/3ML solution pen-injector injection INJECT 0.6 MG SUBCUTANEOUSLY ONCE DAILY FOR 1 WEEK THEN 1.2 MG ONCE DAILY FOR 1 WEEK THEN 1.8 MG ONCE DAILY     • Viibryd 40 MG tablet tablet Take 40 mg by mouth Daily.     • zolpidem (AMBIEN) 10 MG tablet        No current facility-administered medications on file prior to visit.      Allergies   Allergen Reactions   • Erythromycin GI Intolerance  "  • Prednisone Other (See Comments)     Hypertension          Review of Systems   Constitutional: Negative.    HENT: Negative.    Eyes: Negative.    Respiratory: Negative.    Cardiovascular: Negative.    Gastrointestinal: Negative.    Endocrine: Negative.    Genitourinary: Negative.    Musculoskeletal: Positive for arthralgias.   Skin: Negative.    Allergic/Immunologic: Negative.    Neurological: Negative.    Hematological: Negative.    Psychiatric/Behavioral: Negative.         The patient's Review of Systems was personally reviewed and confirmed as accurate.    Physical Exam  Blood pressure 127/79, pulse 91, height 154.9 cm (60.98\"), weight 93.9 kg (207 lb).    Body mass index is 39.13 kg/m².    GENERAL APPEARANCE: awake, alert, oriented, in no acute distress, well developed, well nourished and obese  LUNGS:  breathing nonlabored  EXTREMITIES: no clubbing, cyanosis  PERIPHERAL PULSES: palpable dorsalis pedis and posterior tibial pulses bilaterally.    GAIT:  Antalgic            Hip Exam:  Left     RANGE OF MOTION:  EXTENSION/FLEXION:  normal (0-110 degrees)  IR (at 90 degrees of flexion):  10  ER (at 90 degrees of flexion):  35  PAIN WITH HIP MOTION:  yes, Localized to groin  PAIN WITH LOGROLL:  no      STINCHFIELD TEST: positive     STRENGTH:  ABDUCTOR:    5/5  ADDUCTOR:  5/5  HIP FLEXION:  5/5     GREATER TROCHANTER BURSAL PAIN:  yes    SENSATION TO LIGHT TOUCH:  DEEP PERONEAL/SUPERFICIAL PERONEAL/SURAL/SAPHENOUS/TIBIAL:   intact    EDEMA:  no  ERYTHEMA:  no  WOUNDS/INCISIONS:   no  _________________________________________________________________  _________________________________________________________________    RADIOGRAPHIC FINDINGS:   No new imaging today.  Prior imaging of the left hip demonstrates end-stage osteoarthritis left hip with bone-on-bone articulation    Assessment/Plan:   Diagnosis Plan   1. Primary osteoarthritis of left hip  Case Request    Pregnancy, Urine - Urine, Clean Catch    CBC and " Differential    Basic metabolic panel    Protime-INR    APTT    Hemoglobin A1c    ECG 12 Lead    Nicotine & Metabolite, Quant    Tranexamic Acid 1,000 mg in sodium chloride 0.9 % 100 mL    Tranexamic Acid 1,000 mg in sodium chloride 0.9 % 100 mL    ceFAZolin (ANCEF) 2 g in sodium chloride 0.9 % 100 mL IVPB    acetaminophen (TYLENOL) tablet 975 mg    meloxicam (MOBIC) tablet 15 mg    pregabalin (LYRICA) capsule 75 mg    mupirocin (BACTROBAN) 2 % nasal ointment 1 application    Case Request   2. Class 2 obesity due to excess calories without serious comorbidity with body mass index (BMI) of 39.0 to 39.9 in adult       The patient has clinical and radiographic evidence of end-stage left hip joint degeneration. Conservative measures have been tried for 3 months or longer, but have failed to adequately treat or improve the patient's symptoms. Pain is restricting the patient's daily activities as well as quality of life. The recommendation at this time is to proceed with a left total hip arthroplasty with the goal to improve patient function and pain. The risks, benefits, potential complications, and alternatives were discussed with the patient in detail. Risks included but were not limited to bleeding, infection, anesthesia risks, damage to neurovascular structures, osteolysis, aseptic loosening, instability, dislocation, pain, continued pain, iatrogenic fracture, leg length discrepancy, possible need for future surgery including the potential for amputation, blood clots, myocardial infarction, stroke, and death. Jennifer-operative blood management and the potential for blood transfusion were discussed with risks and options clearly outlined. Specific details of the surgical procedure, hospitalization, recovery, rehabilitation, and long-term precautions were also presented. Pre-operative teaching was provided. Implant/prosthesis selection was outlined, and the many options available were explained; the final choice will be  made at the time of the procedure to match the anatomy and condition of the bone, ligaments, tendons, and muscles. Given this instruction, the patient elected to proceed with the left total hip arthroplasty. The patient will be seen by pre-admission testing for pre-operative optimization and risk assessment and will be scheduled for surgery once this is completed.    The patient is considered standard risk for DVT based on patient risk factors and will be placed on aspirin postoperatively for DVT prophylaxis.      Jeremiah Alcala MD  06/10/21  12:09 EDT

## 2021-06-16 ENCOUNTER — TELEPHONE (OUTPATIENT)
Dept: ORTHOPEDIC SURGERY | Facility: CLINIC | Age: 56
End: 2021-06-16

## 2021-06-16 NOTE — TELEPHONE ENCOUNTER
PATIENT CALLED WANTING TO KNOW IF SHE COULD GET HER 2ND SHINGLES SHOT ANY DAY BEFORE HER SURGERY. HER SURGERY IS SCHEDULED FOR 6/23/21. PATIENT WOULD LIKE A CALL BACK [phone]

## 2021-06-16 NOTE — TELEPHONE ENCOUNTER
Called patient and let her know that Dr Alcala wants her to have her vaccine at least 2 days prior to surgery. She understood, and will get it before the end of the week.     Traci

## 2021-06-18 ENCOUNTER — TELEPHONE (OUTPATIENT)
Dept: ORTHOPEDIC SURGERY | Facility: CLINIC | Age: 56
End: 2021-06-18

## 2021-06-18 NOTE — TELEPHONE ENCOUNTER
Spoke with Amy who stated pt should be ok to take Flexeril but advised pt to not take any more past Sunday. I called pt to let her know this and she understood.    Turner

## 2021-06-19 LAB
COTININE SERPL-MCNC: <1 NG/ML
NICOTINE SERPL-MCNC: <1 NG/ML

## 2021-06-21 ENCOUNTER — APPOINTMENT (OUTPATIENT)
Dept: PREADMISSION TESTING | Facility: HOSPITAL | Age: 56
End: 2021-06-21

## 2021-06-21 LAB — SARS-COV-2 RNA PNL SPEC NAA+PROBE: NOT DETECTED

## 2021-06-21 PROCEDURE — U0004 COV-19 TEST NON-CDC HGH THRU: HCPCS

## 2021-06-21 PROCEDURE — C9803 HOPD COVID-19 SPEC COLLECT: HCPCS

## 2021-06-22 ENCOUNTER — ANESTHESIA EVENT (OUTPATIENT)
Dept: PERIOP | Facility: HOSPITAL | Age: 56
End: 2021-06-22

## 2021-06-22 RX ORDER — FAMOTIDINE 10 MG/ML
20 INJECTION, SOLUTION INTRAVENOUS ONCE
Status: CANCELLED | OUTPATIENT
Start: 2021-06-22 | End: 2021-06-22

## 2021-06-23 ENCOUNTER — ANESTHESIA (OUTPATIENT)
Dept: PERIOP | Facility: HOSPITAL | Age: 56
End: 2021-06-23

## 2021-06-23 ENCOUNTER — APPOINTMENT (OUTPATIENT)
Dept: GENERAL RADIOLOGY | Facility: HOSPITAL | Age: 56
End: 2021-06-23

## 2021-06-23 ENCOUNTER — HOSPITAL ENCOUNTER (OUTPATIENT)
Facility: HOSPITAL | Age: 56
Discharge: HOME | End: 2021-06-23
Attending: ORTHOPAEDIC SURGERY | Admitting: ORTHOPAEDIC SURGERY

## 2021-06-23 VITALS
SYSTOLIC BLOOD PRESSURE: 103 MMHG | RESPIRATION RATE: 12 BRPM | BODY MASS INDEX: 39.46 KG/M2 | TEMPERATURE: 98.2 F | HEART RATE: 74 BPM | WEIGHT: 209 LBS | OXYGEN SATURATION: 99 % | DIASTOLIC BLOOD PRESSURE: 71 MMHG | HEIGHT: 61 IN

## 2021-06-23 DIAGNOSIS — Z96.642 STATUS POST TOTAL REPLACEMENT OF LEFT HIP: Primary | ICD-10-CM

## 2021-06-23 DIAGNOSIS — M16.12 PRIMARY OSTEOARTHRITIS OF LEFT HIP: ICD-10-CM

## 2021-06-23 PROBLEM — I10 HYPERTENSION: Status: ACTIVE | Noted: 2021-06-23

## 2021-06-23 PROBLEM — E11.9 DIABETES: Status: ACTIVE | Noted: 2021-06-23

## 2021-06-23 PROBLEM — G47.33 OSA ON CPAP: Status: ACTIVE | Noted: 2021-06-23

## 2021-06-23 PROBLEM — J45.909 ASTHMA: Status: ACTIVE | Noted: 2021-06-23

## 2021-06-23 PROBLEM — E03.9 HYPOTHYROID: Status: ACTIVE | Noted: 2021-06-23

## 2021-06-23 PROBLEM — E78.5 HYPERLIPIDEMIA: Status: ACTIVE | Noted: 2021-06-23

## 2021-06-23 PROBLEM — Z99.89 OSA ON CPAP: Status: ACTIVE | Noted: 2021-06-23

## 2021-06-23 LAB
GLUCOSE BLDC GLUCOMTR-MCNC: 109 MG/DL (ref 70–130)
GLUCOSE BLDC GLUCOMTR-MCNC: 179 MG/DL (ref 70–130)
POTASSIUM SERPL-SCNC: 3.3 MMOL/L (ref 3.5–5.2)

## 2021-06-23 PROCEDURE — 72170 X-RAY EXAM OF PELVIS: CPT

## 2021-06-23 PROCEDURE — G0378 HOSPITAL OBSERVATION PER HR: HCPCS

## 2021-06-23 PROCEDURE — 25010000002 FENTANYL CITRATE (PF) 50 MCG/ML SOLUTION: Performed by: NURSE ANESTHETIST, CERTIFIED REGISTERED

## 2021-06-23 PROCEDURE — 25010000002 HYDROMORPHONE PER 4 MG: Performed by: NURSE ANESTHETIST, CERTIFIED REGISTERED

## 2021-06-23 PROCEDURE — S0260 H&P FOR SURGERY: HCPCS | Performed by: PHYSICIAN ASSISTANT

## 2021-06-23 PROCEDURE — 25010000002 ONDANSETRON PER 1 MG: Performed by: NURSE ANESTHETIST, CERTIFIED REGISTERED

## 2021-06-23 PROCEDURE — 84132 ASSAY OF SERUM POTASSIUM: CPT | Performed by: ANESTHESIOLOGY

## 2021-06-23 PROCEDURE — 25010000002 NEOSTIGMINE 10 MG/10ML SOLUTION: Performed by: NURSE ANESTHETIST, CERTIFIED REGISTERED

## 2021-06-23 PROCEDURE — 25010000002 PROPOFOL 10 MG/ML EMULSION: Performed by: NURSE ANESTHETIST, CERTIFIED REGISTERED

## 2021-06-23 PROCEDURE — C1776 JOINT DEVICE (IMPLANTABLE): HCPCS | Performed by: ORTHOPAEDIC SURGERY

## 2021-06-23 PROCEDURE — 25010000002 DEXAMETHASONE PER 1 MG: Performed by: NURSE ANESTHETIST, CERTIFIED REGISTERED

## 2021-06-23 PROCEDURE — 25010000003 CEFAZOLIN IN DEXTROSE 2-4 GM/100ML-% SOLUTION: Performed by: ORTHOPAEDIC SURGERY

## 2021-06-23 PROCEDURE — 82962 GLUCOSE BLOOD TEST: CPT

## 2021-06-23 PROCEDURE — 25010000002 KETOROLAC TROMETHAMINE PER 15 MG: Performed by: ORTHOPAEDIC SURGERY

## 2021-06-23 PROCEDURE — C1889 IMPLANT/INSERT DEVICE, NOC: HCPCS | Performed by: ORTHOPAEDIC SURGERY

## 2021-06-23 PROCEDURE — 97116 GAIT TRAINING THERAPY: CPT

## 2021-06-23 PROCEDURE — 97165 OT EVAL LOW COMPLEX 30 MIN: CPT

## 2021-06-23 PROCEDURE — 97161 PT EVAL LOW COMPLEX 20 MIN: CPT

## 2021-06-23 PROCEDURE — 25010000002 MORPHINE PER 10 MG: Performed by: ORTHOPAEDIC SURGERY

## 2021-06-23 PROCEDURE — 25010000002 ROPIVACAINE PER 1 MG: Performed by: ORTHOPAEDIC SURGERY

## 2021-06-23 PROCEDURE — 97535 SELF CARE MNGMENT TRAINING: CPT

## 2021-06-23 PROCEDURE — 27130 TOTAL HIP ARTHROPLASTY: CPT | Performed by: ORTHOPAEDIC SURGERY

## 2021-06-23 DEVICE — SHLL TRIDENT2 TRITANIUM C/HL 46MM: Type: IMPLANTABLE DEVICE | Site: HIP | Status: FUNCTIONAL

## 2021-06-23 DEVICE — IMPLANTABLE DEVICE: Type: IMPLANTABLE DEVICE | Site: HIP | Status: FUNCTIONAL

## 2021-06-23 DEVICE — STEM FEM ACCOLADE2 V40 127D 30X102X38 SZ3: Type: IMPLANTABLE DEVICE | Site: HIP | Status: FUNCTIONAL

## 2021-06-23 DEVICE — DEV CONTRL TISS STRATAFIX SPIRAL PDO BIDIR 1 36X36CM: Type: IMPLANTABLE DEVICE | Site: HIP | Status: FUNCTIONAL

## 2021-06-23 DEVICE — CAP TOTL HIP MOBL BEAR 5 PC: Type: IMPLANTABLE DEVICE | Site: HIP | Status: FUNCTIONAL

## 2021-06-23 DEVICE — SCRW HEX LP TRIDENT2 6.5X25MM: Type: IMPLANTABLE DEVICE | Site: HIP | Status: FUNCTIONAL

## 2021-06-23 DEVICE — SCRW HEX LP TRIDENT2 6.5X35MM: Type: IMPLANTABLE DEVICE | Site: HIP | Status: FUNCTIONAL

## 2021-06-23 DEVICE — DEV CONTRL TISS STRATAFIX SPIRAL PDO BIDIR MO4 36X36CM: Type: IMPLANTABLE DEVICE | Site: HIP | Status: FUNCTIONAL

## 2021-06-23 DEVICE — WAX BONE HEMO AESCULAP 2.5GM: Type: IMPLANTABLE DEVICE | Site: HIP | Status: FUNCTIONAL

## 2021-06-23 DEVICE — HD TPR FEM/HIP V40 LFIT COCR 22.2MM MIN0: Type: IMPLANTABLE DEVICE | Site: HIP | Status: FUNCTIONAL

## 2021-06-23 RX ORDER — LOSARTAN POTASSIUM 50 MG/1
100 TABLET ORAL
Status: DISCONTINUED | OUTPATIENT
Start: 2021-06-23 | End: 2021-06-23 | Stop reason: HOSPADM

## 2021-06-23 RX ORDER — CEFAZOLIN SODIUM 2 G/100ML
2 INJECTION, SOLUTION INTRAVENOUS EVERY 8 HOURS
Status: DISCONTINUED | OUTPATIENT
Start: 2021-06-23 | End: 2021-06-23 | Stop reason: HOSPADM

## 2021-06-23 RX ORDER — VILAZODONE HYDROCHLORIDE 40 MG/1
40 TABLET ORAL DAILY
Status: DISCONTINUED | OUTPATIENT
Start: 2021-06-24 | End: 2021-06-23 | Stop reason: HOSPADM

## 2021-06-23 RX ORDER — SODIUM CHLORIDE, SODIUM LACTATE, POTASSIUM CHLORIDE, CALCIUM CHLORIDE 600; 310; 30; 20 MG/100ML; MG/100ML; MG/100ML; MG/100ML
9 INJECTION, SOLUTION INTRAVENOUS CONTINUOUS
Status: DISCONTINUED | OUTPATIENT
Start: 2021-06-23 | End: 2021-06-23 | Stop reason: HOSPADM

## 2021-06-23 RX ORDER — ARIPIPRAZOLE 2 MG/1
2 TABLET ORAL NIGHTLY
Status: DISCONTINUED | OUTPATIENT
Start: 2021-06-23 | End: 2021-06-23 | Stop reason: HOSPADM

## 2021-06-23 RX ORDER — MIDAZOLAM HYDROCHLORIDE 1 MG/ML
1 INJECTION INTRAMUSCULAR; INTRAVENOUS
Status: DISCONTINUED | OUTPATIENT
Start: 2021-06-23 | End: 2021-06-23 | Stop reason: HOSPADM

## 2021-06-23 RX ORDER — OXYCODONE HYDROCHLORIDE 5 MG/1
5 TABLET ORAL EVERY 4 HOURS PRN
Status: DISCONTINUED | OUTPATIENT
Start: 2021-06-23 | End: 2021-06-23 | Stop reason: HOSPADM

## 2021-06-23 RX ORDER — ONDANSETRON 4 MG/1
4 TABLET, FILM COATED ORAL EVERY 6 HOURS PRN
Status: DISCONTINUED | OUTPATIENT
Start: 2021-06-23 | End: 2021-06-23 | Stop reason: HOSPADM

## 2021-06-23 RX ORDER — AMLODIPINE BESYLATE 2.5 MG/1
2.5 TABLET ORAL DAILY
Status: DISCONTINUED | OUTPATIENT
Start: 2021-06-23 | End: 2021-06-23 | Stop reason: HOSPADM

## 2021-06-23 RX ORDER — LIDOCAINE HYDROCHLORIDE 10 MG/ML
0.5 INJECTION, SOLUTION EPIDURAL; INFILTRATION; INTRACAUDAL; PERINEURAL ONCE AS NEEDED
Status: COMPLETED | OUTPATIENT
Start: 2021-06-23 | End: 2021-06-23

## 2021-06-23 RX ORDER — ASPIRIN 81 MG/1
81 TABLET ORAL EVERY 12 HOURS SCHEDULED
Status: DISCONTINUED | OUTPATIENT
Start: 2021-06-24 | End: 2021-06-23 | Stop reason: HOSPADM

## 2021-06-23 RX ORDER — BUPIVACAINE HCL/0.9 % NACL/PF 0.125 %
PLASTIC BAG, INJECTION (ML) EPIDURAL AS NEEDED
Status: DISCONTINUED | OUTPATIENT
Start: 2021-06-23 | End: 2021-06-23 | Stop reason: SURG

## 2021-06-23 RX ORDER — OXYCODONE HYDROCHLORIDE 5 MG/1
10 TABLET ORAL EVERY 4 HOURS PRN
Status: DISCONTINUED | OUTPATIENT
Start: 2021-06-23 | End: 2021-06-23 | Stop reason: HOSPADM

## 2021-06-23 RX ORDER — EPHEDRINE SULFATE 50 MG/ML
INJECTION, SOLUTION INTRAVENOUS AS NEEDED
Status: DISCONTINUED | OUTPATIENT
Start: 2021-06-23 | End: 2021-06-23 | Stop reason: SURG

## 2021-06-23 RX ORDER — DEXTROSE MONOHYDRATE 25 G/50ML
25 INJECTION, SOLUTION INTRAVENOUS
Status: DISCONTINUED | OUTPATIENT
Start: 2021-06-23 | End: 2021-06-23 | Stop reason: HOSPADM

## 2021-06-23 RX ORDER — ASPIRIN 81 MG/1
81 TABLET ORAL EVERY 12 HOURS SCHEDULED
Qty: 60 TABLET | Refills: 0 | Status: SHIPPED | OUTPATIENT
Start: 2021-06-24 | End: 2021-10-12

## 2021-06-23 RX ORDER — LIDOCAINE HYDROCHLORIDE 10 MG/ML
INJECTION, SOLUTION EPIDURAL; INFILTRATION; INTRACAUDAL; PERINEURAL AS NEEDED
Status: DISCONTINUED | OUTPATIENT
Start: 2021-06-23 | End: 2021-06-23 | Stop reason: SURG

## 2021-06-23 RX ORDER — CHOLECALCIFEROL (VITAMIN D3) 125 MCG
10 CAPSULE ORAL NIGHTLY
Status: DISCONTINUED | OUTPATIENT
Start: 2021-06-23 | End: 2021-06-23 | Stop reason: HOSPADM

## 2021-06-23 RX ORDER — ACETAMINOPHEN 500 MG
1000 TABLET ORAL ONCE
Status: COMPLETED | OUTPATIENT
Start: 2021-06-23 | End: 2021-06-23

## 2021-06-23 RX ORDER — ONDANSETRON 2 MG/ML
INJECTION INTRAMUSCULAR; INTRAVENOUS AS NEEDED
Status: DISCONTINUED | OUTPATIENT
Start: 2021-06-23 | End: 2021-06-23 | Stop reason: SURG

## 2021-06-23 RX ORDER — MELOXICAM 7.5 MG/1
15 TABLET ORAL DAILY
Status: DISCONTINUED | OUTPATIENT
Start: 2021-06-23 | End: 2021-06-23 | Stop reason: HOSPADM

## 2021-06-23 RX ORDER — CEFAZOLIN SODIUM 2 G/100ML
2 INJECTION, SOLUTION INTRAVENOUS ONCE
Status: COMPLETED | OUTPATIENT
Start: 2021-06-23 | End: 2021-06-23

## 2021-06-23 RX ORDER — MAGNESIUM HYDROXIDE 1200 MG/15ML
LIQUID ORAL AS NEEDED
Status: DISCONTINUED | OUTPATIENT
Start: 2021-06-23 | End: 2021-06-23 | Stop reason: HOSPADM

## 2021-06-23 RX ORDER — FENTANYL CITRATE 50 UG/ML
INJECTION, SOLUTION INTRAMUSCULAR; INTRAVENOUS AS NEEDED
Status: DISCONTINUED | OUTPATIENT
Start: 2021-06-23 | End: 2021-06-23 | Stop reason: SURG

## 2021-06-23 RX ORDER — MELOXICAM 15 MG/1
15 TABLET ORAL ONCE
Status: DISCONTINUED | OUTPATIENT
Start: 2021-06-23 | End: 2021-06-23 | Stop reason: HOSPADM

## 2021-06-23 RX ORDER — NALOXONE HCL 0.4 MG/ML
0.1 VIAL (ML) INJECTION
Status: DISCONTINUED | OUTPATIENT
Start: 2021-06-23 | End: 2021-06-23 | Stop reason: HOSPADM

## 2021-06-23 RX ORDER — SODIUM CHLORIDE 9 MG/ML
100 INJECTION, SOLUTION INTRAVENOUS CONTINUOUS
Status: DISCONTINUED | OUTPATIENT
Start: 2021-06-23 | End: 2021-06-23 | Stop reason: HOSPADM

## 2021-06-23 RX ORDER — FAMOTIDINE 20 MG/1
20 TABLET, FILM COATED ORAL ONCE
Status: COMPLETED | OUTPATIENT
Start: 2021-06-23 | End: 2021-06-23

## 2021-06-23 RX ORDER — DEXAMETHASONE SODIUM PHOSPHATE 4 MG/ML
INJECTION, SOLUTION INTRA-ARTICULAR; INTRALESIONAL; INTRAMUSCULAR; INTRAVENOUS; SOFT TISSUE AS NEEDED
Status: DISCONTINUED | OUTPATIENT
Start: 2021-06-23 | End: 2021-06-23 | Stop reason: SURG

## 2021-06-23 RX ORDER — OXYCODONE HYDROCHLORIDE 5 MG/1
5 TABLET ORAL EVERY 4 HOURS PRN
Qty: 40 TABLET | Refills: 0 | Status: SHIPPED | OUTPATIENT
Start: 2021-06-23 | End: 2021-07-13

## 2021-06-23 RX ORDER — BUDESONIDE AND FORMOTEROL FUMARATE DIHYDRATE 160; 4.5 UG/1; UG/1
2 AEROSOL RESPIRATORY (INHALATION)
Status: DISCONTINUED | OUTPATIENT
Start: 2021-06-23 | End: 2021-06-23 | Stop reason: HOSPADM

## 2021-06-23 RX ORDER — ONDANSETRON 2 MG/ML
4 INJECTION INTRAMUSCULAR; INTRAVENOUS EVERY 6 HOURS PRN
Status: DISCONTINUED | OUTPATIENT
Start: 2021-06-23 | End: 2021-06-23 | Stop reason: HOSPADM

## 2021-06-23 RX ORDER — LEVOTHYROXINE SODIUM 0.05 MG/1
50 TABLET ORAL
Status: DISCONTINUED | OUTPATIENT
Start: 2021-06-24 | End: 2021-06-23 | Stop reason: HOSPADM

## 2021-06-23 RX ORDER — ZOLPIDEM TARTRATE 5 MG/1
5 TABLET ORAL NIGHTLY PRN
Status: DISCONTINUED | OUTPATIENT
Start: 2021-06-23 | End: 2021-06-23 | Stop reason: HOSPADM

## 2021-06-23 RX ORDER — GLYCOPYRROLATE 0.2 MG/ML
INJECTION INTRAMUSCULAR; INTRAVENOUS AS NEEDED
Status: DISCONTINUED | OUTPATIENT
Start: 2021-06-23 | End: 2021-06-23 | Stop reason: SURG

## 2021-06-23 RX ORDER — LABETALOL HYDROCHLORIDE 5 MG/ML
5 INJECTION, SOLUTION INTRAVENOUS
Status: DISCONTINUED | OUTPATIENT
Start: 2021-06-23 | End: 2021-06-23

## 2021-06-23 RX ORDER — LABETALOL HYDROCHLORIDE 5 MG/ML
10 INJECTION, SOLUTION INTRAVENOUS EVERY 4 HOURS PRN
Status: DISCONTINUED | OUTPATIENT
Start: 2021-06-23 | End: 2021-06-23 | Stop reason: HOSPADM

## 2021-06-23 RX ORDER — MONTELUKAST SODIUM 10 MG/1
10 TABLET ORAL NIGHTLY
Status: DISCONTINUED | OUTPATIENT
Start: 2021-06-23 | End: 2021-06-23 | Stop reason: HOSPADM

## 2021-06-23 RX ORDER — GUAIFENESIN 600 MG/1
1200 TABLET, EXTENDED RELEASE ORAL 2 TIMES DAILY PRN
Status: DISCONTINUED | OUTPATIENT
Start: 2021-06-23 | End: 2021-06-23 | Stop reason: HOSPADM

## 2021-06-23 RX ORDER — DROPERIDOL 2.5 MG/ML
0.62 INJECTION, SOLUTION INTRAMUSCULAR; INTRAVENOUS ONCE AS NEEDED
Status: DISCONTINUED | OUTPATIENT
Start: 2021-06-23 | End: 2021-06-23

## 2021-06-23 RX ORDER — TRANEXAMIC ACID 10 MG/ML
1000 INJECTION, SOLUTION INTRAVENOUS ONCE
Status: COMPLETED | OUTPATIENT
Start: 2021-06-23 | End: 2021-06-23

## 2021-06-23 RX ORDER — FENTANYL CITRATE 50 UG/ML
50 INJECTION, SOLUTION INTRAMUSCULAR; INTRAVENOUS
Status: DISCONTINUED | OUTPATIENT
Start: 2021-06-23 | End: 2021-06-23

## 2021-06-23 RX ORDER — PROPOFOL 10 MG/ML
VIAL (ML) INTRAVENOUS AS NEEDED
Status: DISCONTINUED | OUTPATIENT
Start: 2021-06-23 | End: 2021-06-23 | Stop reason: SURG

## 2021-06-23 RX ORDER — PREGABALIN 75 MG/1
75 CAPSULE ORAL ONCE
Status: COMPLETED | OUTPATIENT
Start: 2021-06-23 | End: 2021-06-23

## 2021-06-23 RX ORDER — NICOTINE POLACRILEX 4 MG
15 LOZENGE BUCCAL
Status: DISCONTINUED | OUTPATIENT
Start: 2021-06-23 | End: 2021-06-23 | Stop reason: HOSPADM

## 2021-06-23 RX ORDER — SODIUM CHLORIDE 0.9 % (FLUSH) 0.9 %
10 SYRINGE (ML) INJECTION EVERY 12 HOURS SCHEDULED
Status: DISCONTINUED | OUTPATIENT
Start: 2021-06-23 | End: 2021-06-23 | Stop reason: HOSPADM

## 2021-06-23 RX ORDER — ACETAMINOPHEN 500 MG
1000 TABLET ORAL EVERY 8 HOURS
Status: DISCONTINUED | OUTPATIENT
Start: 2021-06-23 | End: 2021-06-23 | Stop reason: HOSPADM

## 2021-06-23 RX ORDER — MELOXICAM 15 MG/1
15 TABLET ORAL DAILY
Qty: 10 TABLET | Refills: 0 | Status: SHIPPED | OUTPATIENT
Start: 2021-06-24 | End: 2021-10-12

## 2021-06-23 RX ORDER — ESMOLOL HYDROCHLORIDE 10 MG/ML
INJECTION INTRAVENOUS AS NEEDED
Status: DISCONTINUED | OUTPATIENT
Start: 2021-06-23 | End: 2021-06-23 | Stop reason: SURG

## 2021-06-23 RX ORDER — SODIUM CHLORIDE 0.9 % (FLUSH) 0.9 %
10 SYRINGE (ML) INJECTION AS NEEDED
Status: DISCONTINUED | OUTPATIENT
Start: 2021-06-23 | End: 2021-06-23 | Stop reason: HOSPADM

## 2021-06-23 RX ORDER — HYDROMORPHONE HYDROCHLORIDE 1 MG/ML
0.5 INJECTION, SOLUTION INTRAMUSCULAR; INTRAVENOUS; SUBCUTANEOUS
Status: DISCONTINUED | OUTPATIENT
Start: 2021-06-23 | End: 2021-06-23

## 2021-06-23 RX ORDER — ROCURONIUM BROMIDE 10 MG/ML
INJECTION, SOLUTION INTRAVENOUS AS NEEDED
Status: DISCONTINUED | OUTPATIENT
Start: 2021-06-23 | End: 2021-06-23 | Stop reason: SURG

## 2021-06-23 RX ORDER — DOCUSATE SODIUM 100 MG/1
100 CAPSULE, LIQUID FILLED ORAL 2 TIMES DAILY
Qty: 60 CAPSULE | Refills: 0 | Status: SHIPPED | OUTPATIENT
Start: 2021-06-23 | End: 2021-10-12

## 2021-06-23 RX ORDER — ALBUTEROL SULFATE 2.5 MG/3ML
2.5 SOLUTION RESPIRATORY (INHALATION) EVERY 6 HOURS PRN
Status: DISCONTINUED | OUTPATIENT
Start: 2021-06-23 | End: 2021-06-23 | Stop reason: HOSPADM

## 2021-06-23 RX ORDER — NEOSTIGMINE METHYLSULFATE 1 MG/ML
INJECTION, SOLUTION INTRAVENOUS AS NEEDED
Status: DISCONTINUED | OUTPATIENT
Start: 2021-06-23 | End: 2021-06-23 | Stop reason: SURG

## 2021-06-23 RX ORDER — DULOXETIN HYDROCHLORIDE 60 MG/1
60 CAPSULE, DELAYED RELEASE ORAL NIGHTLY
Status: DISCONTINUED | OUTPATIENT
Start: 2021-06-23 | End: 2021-06-23 | Stop reason: HOSPADM

## 2021-06-23 RX ORDER — PANTOPRAZOLE SODIUM 40 MG/1
40 TABLET, DELAYED RELEASE ORAL
Status: DISCONTINUED | OUTPATIENT
Start: 2021-06-24 | End: 2021-06-23 | Stop reason: HOSPADM

## 2021-06-23 RX ORDER — ACETAMINOPHEN 500 MG
1000 TABLET ORAL EVERY 8 HOURS
Qty: 42 TABLET | Refills: 0 | Status: SHIPPED | OUTPATIENT
Start: 2021-06-23

## 2021-06-23 RX ORDER — ATORVASTATIN CALCIUM 40 MG/1
40 TABLET, FILM COATED ORAL NIGHTLY
Status: DISCONTINUED | OUTPATIENT
Start: 2021-06-23 | End: 2021-06-23 | Stop reason: HOSPADM

## 2021-06-23 RX ADMIN — FENTANYL CITRATE 50 MCG: 50 INJECTION INTRAMUSCULAR; INTRAVENOUS at 11:32

## 2021-06-23 RX ADMIN — FAMOTIDINE 20 MG: 20 TABLET, FILM COATED ORAL at 06:31

## 2021-06-23 RX ADMIN — LIDOCAINE HYDROCHLORIDE 0.5 ML: 10 INJECTION, SOLUTION EPIDURAL; INFILTRATION; INTRACAUDAL; PERINEURAL at 06:31

## 2021-06-23 RX ADMIN — ESMOLOL HYDROCHLORIDE 30 MG: 10 INJECTION, SOLUTION INTRAVENOUS at 07:57

## 2021-06-23 RX ADMIN — PROPOFOL 200 MG: 10 INJECTION, EMULSION INTRAVENOUS at 07:49

## 2021-06-23 RX ADMIN — PROPOFOL 50 MCG/KG/MIN: 10 INJECTION, EMULSION INTRAVENOUS at 08:05

## 2021-06-23 RX ADMIN — ACETAMINOPHEN 1000 MG: 500 TABLET, FILM COATED ORAL at 14:14

## 2021-06-23 RX ADMIN — ONDANSETRON 4 MG: 2 INJECTION INTRAMUSCULAR; INTRAVENOUS at 09:44

## 2021-06-23 RX ADMIN — Medication 100 MCG: at 08:29

## 2021-06-23 RX ADMIN — PROPOFOL 50 MG: 10 INJECTION, EMULSION INTRAVENOUS at 07:28

## 2021-06-23 RX ADMIN — CEFAZOLIN SODIUM 2 G: 10 INJECTION, POWDER, FOR SOLUTION INTRAVENOUS at 07:28

## 2021-06-23 RX ADMIN — TRANEXAMIC ACID 1000 MG: 10 INJECTION, SOLUTION INTRAVENOUS at 09:40

## 2021-06-23 RX ADMIN — ESMOLOL HYDROCHLORIDE 30 MG: 10 INJECTION, SOLUTION INTRAVENOUS at 08:19

## 2021-06-23 RX ADMIN — SODIUM CHLORIDE, POTASSIUM CHLORIDE, SODIUM LACTATE AND CALCIUM CHLORIDE 9 ML/HR: 600; 310; 30; 20 INJECTION, SOLUTION INTRAVENOUS at 06:30

## 2021-06-23 RX ADMIN — OXYCODONE 5 MG: 5 TABLET ORAL at 16:34

## 2021-06-23 RX ADMIN — CEFAZOLIN SODIUM 2 G: 10 INJECTION, POWDER, FOR SOLUTION INTRAVENOUS at 14:23

## 2021-06-23 RX ADMIN — HYDROMORPHONE HYDROCHLORIDE 0.5 MG: 1 INJECTION, SOLUTION INTRAMUSCULAR; INTRAVENOUS; SUBCUTANEOUS at 10:51

## 2021-06-23 RX ADMIN — FENTANYL CITRATE 100 MCG: 50 INJECTION, SOLUTION INTRAMUSCULAR; INTRAVENOUS at 08:21

## 2021-06-23 RX ADMIN — SODIUM CHLORIDE, POTASSIUM CHLORIDE, SODIUM LACTATE AND CALCIUM CHLORIDE: 600; 310; 30; 20 INJECTION, SOLUTION INTRAVENOUS at 09:28

## 2021-06-23 RX ADMIN — EPHEDRINE SULFATE 15 MG: 50 INJECTION INTRAVENOUS at 08:28

## 2021-06-23 RX ADMIN — TRANEXAMIC ACID 1000 MG: 10 INJECTION, SOLUTION INTRAVENOUS at 07:58

## 2021-06-23 RX ADMIN — ROCURONIUM BROMIDE 50 MG: 10 INJECTION, SOLUTION INTRAVENOUS at 07:49

## 2021-06-23 RX ADMIN — FENTANYL CITRATE 50 MCG: 50 INJECTION INTRAMUSCULAR; INTRAVENOUS at 11:54

## 2021-06-23 RX ADMIN — DEXAMETHASONE SODIUM PHOSPHATE 8 MG: 4 INJECTION, SOLUTION INTRA-ARTICULAR; INTRALESIONAL; INTRAMUSCULAR; INTRAVENOUS; SOFT TISSUE at 08:03

## 2021-06-23 RX ADMIN — SODIUM CHLORIDE 100 ML/HR: 9 INJECTION, SOLUTION INTRAVENOUS at 13:07

## 2021-06-23 RX ADMIN — HYDROMORPHONE HYDROCHLORIDE 0.5 MG: 1 INJECTION, SOLUTION INTRAMUSCULAR; INTRAVENOUS; SUBCUTANEOUS at 11:12

## 2021-06-23 RX ADMIN — NEOSTIGMINE 3 MG: 1 INJECTION INTRAVENOUS at 10:08

## 2021-06-23 RX ADMIN — ACETAMINOPHEN 1000 MG: 500 TABLET ORAL at 06:31

## 2021-06-23 RX ADMIN — PREGABALIN 75 MG: 75 CAPSULE ORAL at 06:31

## 2021-06-23 RX ADMIN — GLYCOPYRROLATE 0.4 MCG: 0.4 INJECTION INTRAMUSCULAR; INTRAVENOUS at 10:08

## 2021-06-23 RX ADMIN — LIDOCAINE HYDROCHLORIDE 50 MG: 10 INJECTION, SOLUTION EPIDURAL; INFILTRATION; INTRACAUDAL; PERINEURAL at 07:28

## 2021-06-23 RX ADMIN — LIDOCAINE HYDROCHLORIDE 50 MG: 10 INJECTION, SOLUTION EPIDURAL; INFILTRATION; INTRACAUDAL; PERINEURAL at 07:49

## 2021-06-23 NOTE — PLAN OF CARE
Goal Outcome Evaluation:  Plan of Care Reviewed With: patient, friend        Progress: improving  Outcome Summary: OT eval completed. Pt. educated on leticia dressing technique and AE for LBB/LBD to facilitate maintaining PHP. Pt. demonstrated and verbalized understanding. Completed STS from recliner with CGA. Demonstrated LBD with Jose SOLANO. Recommend home with assist and home health at discharge.

## 2021-06-23 NOTE — THERAPY DISCHARGE NOTE
Acute Care - Occupational Therapy Discharge  [facility]    Patient Name: Robina Dumont  : 1965    MRN: [id number]                              Today's Date: 2021       Admit Date: 2021    Visit Dx:     ICD-10-CM ICD-9-CM   1. Status post total replacement of left hip  Z96.642 V43.64   2. Primary osteoarthritis of left hip  M16.12 715.15     Patient Active Problem List   Diagnosis   • Primary osteoarthritis of left hip   • Status post total replacement of left hip   • Hypertension   • Hyperlipidemia   • Hypothyroid   • Diabetes (CMS/HCC)   • JIMMY on CPAP   • Asthma     Past Medical History:   Diagnosis Date   • Anxiety    • Anxiety and depression    • Arthritis    • Asthma    • Depression    • Diabetes mellitus (CMS/HCC)    • Disease of thyroid gland    • GERD (gastroesophageal reflux disease)    • Hyperlipidemia    • Hypertension    • Migraines    • Sleep apnea     c-pap setting reported as 13      Past Surgical History:   Procedure Laterality Date   • ABDOMINAL SURGERY      laparoscopic expolratory   • BLEPHAROPLASTY Bilateral    • CATARACT EXTRACTION, BILATERAL     • CERVIX LESION DESTRUCTION     • COLONOSCOPY     • COSMETIC SURGERY Bilateral     blepharoplasty    • EYE SURGERY Bilateral     cataract   • FINGER/THUMB ARTHROPLASTY Bilateral    • GASTRIC BYPASS     • JOINT REPLACEMENT Bilateral     thumb    • KNEE ARTHROSCOPY Right      General Information     Row Name 21 1526          OT Time and Intention    Document Type  discharge evaluation/summary;evaluation  -LC     Mode of Treatment  occupational therapy  -LC     Row Name 21 1526          General Information    Patient Profile Reviewed  yes  -LC     Prior Level of Function  min assist:;all household mobility;transfer;ADL's  -LC     Existing Precautions/Restrictions  fall;left;hip, posterior  -LC     Barriers to Rehab  none identified  -LC     Row Name 21 1526          Cognition    Orientation Status (Cognition)   oriented x 4  -     Row Name 06/23/21 1526          Safety Issues, Functional Mobility    Safety Issues Affecting Function (Mobility)  safety precaution awareness;safety precautions follow-through/compliance  -     Impairments Affecting Function (Mobility)  endurance/activity tolerance;strength;pain;range of motion (ROM)  -       User Key  (r) = Recorded By, (t) = Taken By, (c) = Cosigned By    Initials Name Provider Type     Crista Zhu OT Occupational Therapist        Mobility/ADL's     Row Name 06/23/21 1527          Bed Mobility    Comment (Bed Mobility)  [facility] on arrival. Educated and demonstrated use of leg  to improve independence and comfort with bed mobility. Pt. verbalized understanding.  -     Row Name 06/23/21 1527          Transfers    Transfers  sit-stand transfer  -     Comment (Transfers)  Educated pt on safe hand placement, to step L LE forward, and limit trunk flexion to maintain PHP and improve comfort with T/Fs. Pt. demonstrated and verbalized understanding.  -     Sit-Stand [place] (Transfers)  contact guard;verbal cues  -[facility] Name 06/23/21 1527          Sit-Stand Transfer    Assistive Device (Sit-Stand Transfers)  walker, front-wheeled  -[facility] Name 06/23/21 1527          Activities of Daily Living    BADL Assessment/Intervention  bathing;lower body dressing  -[facility] Name 06/23/21 1527          Mobility    Extremity Weight-bearing Status  left lower extremity  -     Left Lower Extremity (Weight-bearing Status)  weight-bearing as tolerated (WBAT)  -[facility] Name 06/23/21 1527          Bathing Assessment/Intervention    Assistive Devices (Bathing)  long-handled sponge  -     Position (Bathing)  supported sitting  -     Comment (Bathing)  Educated pt. on LHS for LBB to facilitate maintaining PHP during task. Pt. simulated task with good understanding.  -     Row Name 06/23/21 1527          Lower Body Dressing Assessment/Training    [place]  Level (Lower Body Dressing)  don;pants/bottoms;socks;minimum assist (75% patient effort);verbal cues;shoes/slippers  -     Assistive Devices (Lower Body Dressing)  long-handled shoe horn;reacher;sock-aid  -     Position (Lower Body Dressing)  unsupported sitting  -     Comment (Lower Body Dressing)  Educated pt. on use of AE for LBD to facilitate maintaining PHP. Pt. demonstrated and verbalized understanding.  -       User Key  (r) = Recorded By, (t) = Taken By, (c) = Cosigned By    Initials Name Provider Type     Crista Zhu OT Occupational Therapist        Obj/Interventions     Row Name 06/23/21 1532          Sensory Assessment (Somatosensory)    Sensory Assessment (Somatosensory)  UE sensation intact  -     Row Name 06/23/21 1532          Vision Assessment/Intervention    Visual Impairment/Limitations  WFL  -     Row Name 06/23/21 1532          Range of Motion Comprehensive    General Range of Motion  bilateral upper extremity ROM WNL  -     Row Name 06/23/21 1532          Strength Comprehensive (MMT)    General Manual Muscle Testing (MMT) Assessment  no strength deficits identified  -     Row Name 06/23/21 1532          Motor Skills    Motor Skills  coordination  -     Coordination  WNL  -     Row Name 06/23/21 1532          Balance    Balance Assessment  sitting static balance;sitting dynamic balance;standing static balance;standing dynamic balance  -     Static Sitting Balance  WNL;unsupported;sitting in chair  -     Dynamic Sitting Balance  WNL;unsupported;sitting in chair  -     Static Standing Balance  WFL;supported;standing  -     Dynamic Standing Balance  WFL;supported;standing  -     Balance Interventions  sitting;standing;sit to stand;occupation based/functional task;weight shifting activity  -       User Key  (r) = Recorded By, (t) = Taken By, (c) = Cosigned By    Initials Name Provider Type     Crista Zhu OT Occupational Therapist        Goals/Plan     Row  Name 06/23/21 1537          Transfer Goal 1 (OT)    Activity/Assistive Device (Transfer Goal 1, OT)  sit-to-stand/stand-to-sit;bed-to-chair/chair-to-bed;walker, rolling;commode  -     [place] Level/Cues Needed (Transfer Goal 1, OT)  contact guard assist;verbal cues required  -     Time Frame (Transfer Goal 1, OT)  1 day;long term goal (LTG)  -     Progress/Outcome (Transfer Goal 1, OT)  goal met  -     Row Name 06/23/21 1537          Dressing Goal 1 (OT)    Activity/Device (Dressing Goal 1, OT)  lower body dressing;long-handled shoe horn;reacher;sock-aid  -     [place]/Cues Needed (Dressing Goal 1, OT)  minimum assist (75% or more patient effort);verbal cues required  -     Time Frame (Dressing Goal 1, OT)  1 day;long term goal (LTG)  -     Progress/Outcome (Dressing Goal 1, OT)  goal met  -       User Key  (r) = Recorded By, (t) = Taken By, (c) = Cosigned By    Initials Name Provider Type     Crista Zhu, JOANIE Occupational Therapist        Clinical Impression     Row Name 06/23/21 1537          Pain Assessment    Additional Documentation  Pain Scale: Numbers Pre/Post-Treatment (Group)  -     Row Name 06/23/21 1533          Pain Scale: Numbers Pre/Post-Treatment    Pretreatment Pain Rating  3/10  -     Posttreatment Pain Rating  3/10  -     Pain Location - Side  Left  -     Pain Location - Orientation  generalized  -     Pain Location  hip  -     Pain Intervention(s)  Ambulation/increased activity;Repositioned;Cold applied  -     Row Name 06/23/21 1533          Plan of Care Review    Plan of Care Reviewed With  patient;friend  -     Progress  improving  -     Outcome Summary  OT eval completed. Pt. educated on leticia dressing technique and AE for LBB/LBD to facilitate maintaining PHP. Pt. demonstrated and verbalized understanding. Completed STS from recliner with CGA. Demonstrated LBD with Min A. Recommend home with assist and home health at discharge.  -     Row Name  06/23/21 1533          Therapy Assessment/Plan (OT)    Patient/Family Therapy Goal Statement (OT)  To return to PLOF  -LC     Therapy Frequency (OT)  evaluation only  -LC     Row Name 06/23/21 1533          Therapy Plan Review/Discharge Plan (OT)    Anticipated Discharge Disposition (OT)  home with assist;home with home health  -LC     Row Name 06/23/21 1533          Vital Signs    Pre Systolic BP Rehab  -- VSS  -LC     Pre Patient Position  Sitting  -LC     Intra Patient Position  Standing  -LC     Post Patient Position  Sitting  -LC     Row Name 06/23/21 1533          Positioning and Restraints    Pre-Treatment Position  sitting in chair/recliner  -LC     Post Treatment Position  chair  -LC     In Chair  notified nsg;reclined;call light within reach;encouraged to call for assist;exit alarm on;legs elevated  -LC       User Key  (r) = Recorded By, (t) = Taken By, (c) = Cosigned By    Initials Name Provider Type    Crista Nichols, OT Occupational Therapist        Outcome Measures     Row Name 06/23/21 1538          How much help from another is currently needed...    Putting on and taking off regular lower body clothing?  3  -LC     Bathing (including washing, rinsing, and drying)  3  -LC     Toileting (which includes using toilet bed pan or urinal)  3  -LC     Putting on and taking off regular upper body clothing  4  -LC     Taking care of personal grooming (such as brushing teeth)  4  -LC     Eating meals  4  -LC     AM-PAC 6 Clicks Score (OT)  21  -LC     Row Name 06/23/21 1325          How much help from another person do you currently need...    Turning from your back to your side while in flat bed without using bedrails?  3  -ERICH     Moving from lying on back to sitting on the side of a flat bed without bedrails?  3  -ERICH     Moving to and from a bed to a chair (including a wheelchair)?  3  -ERICH     Standing up from a chair using your arms (e.g., wheelchair, bedside chair)?  3  -ERICH     Climbing 3-5 steps with a  railing?  3  -ERICH     To walk in hospital room?  3  -ERICH     AM-PAC 6 Clicks Score (PT)  18  -ERICH     Row Name 06/23/21 1538 06/23/21 1325       Functional Assessment    Outcome Measure Options  AM-PAC 6 Clicks Daily Activity (OT)  -  AM-PAC 6 Clicks Basic Mobility (PT);PADD  -ERICH      User Key  (r) = Recorded By, (t) = Taken By, (c) = Cosigned By    Initials Name Provider Type     Crista Zhu, OT Occupational Therapist    Cheo Anglin, PT Physical Therapist        Occupational Therapy Education                 Title: PT OT SLP Therapies (In Progress)     Topic: Occupational Therapy (In Progress)     Point: ADL training (Done)     Description:   Instruct learner(s) on proper safety adaptation and remediation techniques during self care or transfers.   Instruct in proper use of assistive devices.              Learning Progress Summary           Patient Acceptance, E, VU,NR by  at 6/23/2021 1325                   Point: Home exercise program (Not Started)     Description:   Instruct learner(s) on appropriate technique for monitoring, assisting and/or progressing therapeutic exercises/activities.              Learner Progress:  Not documented in this visit.          Point: Precautions (Done)     Description:   Instruct learner(s) on prescribed precautions during self-care and functional transfers.              Learning Progress Summary           Patient Acceptance, E, VU,NR by  at 6/23/2021 1325                   Point: Body mechanics (Done)     Description:   Instruct learner(s) on proper positioning and spine alignment during self-care, functional mobility activities and/or exercises.              Learning Progress Summary           Patient Acceptance, E, VU,NR by  at 6/23/2021 1325                               User Key     Initials Effective Dates Name Provider Type [facility] 06/16/21 -  Crista Zhu, OT Occupational Therapist OT              OT Recommendation and Plan  Retired Outcome  Summary/Treatment Plan (OT)  Anticipated Discharge Disposition (OT): home with assist, home with home health  Therapy Frequency (OT): evaluation only  Plan of Care Review  Plan of Care Reviewed With: patient, friend  Progress: improving  Outcome Summary: OT eval completed. Pt. educated on leticia dressing technique and AE for LBB/LBD to facilitate maintaining PHP. Pt. demonstrated and verbalized understanding. Completed STS from recliner with CGA. Demonstrated LBD with Min A. Recommend home with assist and home health at discharge.  Plan of Care Reviewed With: patient, friend  Outcome Summary: OT eval completed. Pt. educated on leticia dressing technique and AE for LBB/LBD to facilitate maintaining PHP. Pt. demonstrated and verbalized understanding. Completed STS from recliner with CGA. Demonstrated LBD with Min A. Recommend home with assist and home health at discharge.     Time Calculation:   Time Calculation- OT     Row Name 06/23/21 1540 06/23/21 1325          Time Calculation- OT    OT Start Time  1325  -LC  --     OT Received On  06/23/21  -LC  --     OT Goal Re-Cert Due Date  07/03/21  -LC  --        Timed Charges    95197 - Gait Training Minutes   --  8  -ERICH     42138 - OT Self Care/Mgmt Minutes  23  -LC  --        Untimed Charges    OT Eval/Re-eval Minutes  38  -LC  --        Total Minutes    Timed Charges Total Minutes  23  -LC  8  -ERICH     Untimed Charges Total Minutes  38  -LC  --      Total Minutes  61  -LC  8  -ERICH       User Key  (r) = Recorded By, (t) = Taken By, (c) = Cosigned By    Initials Name Provider Type    LC Crista Zhu, OT Occupational Therapist    ERICH Cheo Huynh, PT Physical Therapist        Therapy Charges for Today     Code Description Service Date Service Provider Modifiers Qty    [id number]  OT SELF CARE/MGMT/TRAIN EA 15 MIN 6/23/2021 Crista Zhu OT GO 2    [id number] HC OT EVAL LOW COMPLEXITY 3 6/23/2021 Crista Zhu OT GO 1               Crista Zhu OT  6/23/2021

## 2021-06-23 NOTE — H&P
Patient Name: Robina Dumont  MRN: [id number]  : 1965  DOS: 2021    Attending: Jeremiah Alcala MD    Primary Care Provider: Emily Mercer MD      Chief complaint: Left hip pain    Subjective   Patient is a pleasant 56 y.o. female presented for scheduled surgery by Dr. Alcala. She underwent left total hip arthroplasty under spinal anesthesia.  She tolerated surgery well and was admitted for further medical management.  Her hip has been painful for over a year.  She denies use of assistive device for ambulation.  She does report a fall out of her bed last night.    When seen postop she is doing well.  Her pain is well controlled.  She denies nausea, shortness of breath or chest pain.  No history of DVT or PE.    Allergies:  Allergies   Allergen Reactions   • Erythromycin GI Intolerance   • Sudanyl [Pseudoephedrine] Other (See Comments)     Facial swelling    • Prednisone Other (See Comments)     Hypertension         Meds:  Medications Prior to Admission   Medication Sig Dispense Refill Last Dose   • albuterol (ProAir RespiClick) 108 (90 Base) MCG/ACT inhaler Inhale 2 puffs Every 6 (Six) Hours As Needed.   2021 at 2100   • amLODIPine (NORVASC) 2.5 MG tablet Take 2.5 mg by mouth Daily.   2021 at 0800   • ARIPiprazole (ABILIFY) 2 MG tablet Take 2 mg by mouth Daily.   2021 at 2100   • atorvastatin (LIPITOR) 40 MG tablet Take 40 mg by mouth Daily.   2021 at 2100   • B-D UF III MINI PEN NEEDLES 31G X 5 MM misc USE 1 ONCE DAILY WITH VICTOZA INJECTION   Past Week at Unknown time   • butalbital-aspirin-caffeine (FIORINAL) -40 MG capsule Take 1 capsule by mouth Every 8 (Eight) Hours As Needed.   Past Week at Unknown time   • cetirizine (zyrTEC) 10 MG tablet Take 10 mg by mouth Daily.   2021 at 2100   • DULoxetine (CYMBALTA) 60 MG capsule Take 60 mg by mouth Daily.   2021 at 2100   • fluticasone-salmeterol (ADVAIR) 500-50 MCG/DOSE DISKUS Inhale 1 puff 2 (Two) Times a  Day.   6/23/2021 at 0500   • guaiFENesin (MUCINEX) 600 MG 12 hr tablet Take 1,200 mg by mouth 2 (Two) Times a Day As Needed for Cough or Congestion.   Past Month at Unknown time   • levothyroxine (SYNTHROID, LEVOTHROID) 50 MCG tablet Take 50 mcg by mouth Daily.   6/23/2021 at 0500   • losartan-hydrochlorothiazide (HYZAAR) 100-25 MG per tablet Take 1 tablet by mouth Daily.   6/22/2021 at 0800   • melatonin 5 MG tablet tablet Take 10 mg by mouth Every Night.   6/22/2021 at 2100   • metFORMIN (GLUCOPHAGE) 1000 MG tablet Take 1,000 mg by mouth 2 (Two) Times a Day With Meals.   6/22/2021 at 2100   • montelukast (SINGULAIR) 10 MG tablet Take 10 mg by mouth Daily.   6/22/2021 at 2100   • omeprazole (priLOSEC) 20 MG capsule Take 20 mg by mouth Daily.   6/23/2021 at 0500   • potassium chloride (MICRO-K) 10 MEQ CR capsule Take 10 mEq by mouth Daily.   6/23/2021 at 0500   • traMADol (ULTRAM) 50 MG tablet Take 1 tablet by mouth Every 6 (Six) Hours As Needed for Moderate Pain . (Patient taking differently: Take 50 mg by mouth Every 6 (Six) Hours As Needed for Moderate Pain . Insurance would not cover; going to try to go through PCP) 30 tablet 0 Past Week at Unknown time   • Victoza 18 MG/3ML solution pen-injector injection Inject 0.6 mg under the skin into the appropriate area as directed Daily.   6/22/2021 at 100   • Viibryd 40 MG tablet tablet Take 40 mg by mouth Daily.   6/23/2021 at 0500   • zolpidem (AMBIEN) 10 MG tablet Take 10 mg by mouth Every Night.   6/21/2021 at 2100         History:   Past Medical History:   Diagnosis Date   • Anxiety    • Anxiety and depression    • Arthritis    • Asthma    • Depression    • Diabetes mellitus (CMS/HCC)    • Disease of thyroid gland    • GERD (gastroesophageal reflux disease)    • Hyperlipidemia    • Hypertension    • Migraines    • Sleep apnea     c-pap setting reported as 13      Past Surgical History:   Procedure Laterality Date   • ABDOMINAL SURGERY      laparoscopic expolratory  "  • BLEPHAROPLASTY Bilateral    • CATARACT EXTRACTION, BILATERAL     • CERVIX LESION DESTRUCTION     • COLONOSCOPY  2017   • COSMETIC SURGERY Bilateral     blepharoplasty    • EYE SURGERY Bilateral     cataract   • FINGER/THUMB ARTHROPLASTY Bilateral    • GASTRIC BYPASS     • JOINT REPLACEMENT Bilateral     thumb    • KNEE ARTHROSCOPY Right      Family History   Problem Relation Age of Onset   • Heart attack Mother    • Hypertension Mother    • Cancer Mother    • Diabetes Father    • Heart attack Father    • Hypertension Father    • Diabetes Paternal Grandmother      Social History     Tobacco Use   • Smoking status: Never Smoker   • Smokeless tobacco: Never Used   Vaping Use   • Vaping Use: Never used   Substance Use Topics   • Alcohol use: Yes     Comment: rarely   • Drug use: Never   She is  with no children.  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    Review of Systems  All systems were reviewed and negative except for:  Respiratory: positive for  shortness of air    Vital Signs  /71   Pulse 74   Temp 98.2 °F (36.8 °C) (Temporal)   Resp 12   Ht 154.9 cm (61\")   Wt 94.8 kg (209 lb)   SpO2 99%   BMI 39.49 kg/m²     Physical Exam:    General Appearance:    Alert, cooperative, in no acute distress   Head:    Normocephalic, without obvious abnormality, atraumatic   Eyes:            Lids and lashes normal, conjunctivae and sclerae normal, no   icterus, no pallor, corneas clear,    Ears:    Ears appear intact with no abnormalities noted   Throat:   No oral lesions, no thrush, oral mucosa moist   Neck:   No adenopathy, supple, trachea midline, no thyromegaly    Lungs:     Clear to auscultation,respirations regular, even and unlabored    Heart:    Regular rhythm and normal rate, normal S1 and S2, no murmur, no gallop   Abdomen:     Normal bowel sounds, no masses, no organomegaly, soft non-tender, non-distended, no guarding, no rebound  tenderness   Genitalia:    Deferred   Extremities:  Left hip Aquacel CDI "   Pulses:   Pulses palpable and equal bilaterally   Skin:   No bleeding, bruising or rash   Neurologic:   Cranial nerves 2 - 12 grossly intact. Flexion and dorsiflexion intact bilateral feet.        I reviewed the patient's new clinical results.       Results from last 7 days   Lab Units 06/23/21  0633   POTASSIUM mmol/L 3.3*     Lab Results   Component Value Date    HGBA1C 6.60 (H) 06/10/2021     Results for DONTA KHAN (MRN [id number]) as of 6/23/2021 15:12   Ref. Range 6/10/2021 13:28   Glucose Latest Ref Range: 65 - 99 mg/dL 115 (H)   Sodium Latest Ref Range: 136 - 145 mmol/L 138   Potassium Latest Ref Range: 3.5 - 5.2 mmol/L 3.9   CO2 Latest Ref Range: 22.0 - 29.0 mmol/L 28.0   Chloride Latest Ref Range: 98 - 107 mmol/L 96 (L)   Anion Gap Latest Ref Range: 5.0 - 15.0 mmol/L 14.0   Creatinine Latest Ref Range: 0.57 - 1.00 mg/dL 0.87   BUN Latest Ref Range: 6 - 20 mg/dL 22 (H)   BUN/Creatinine Ratio Latest Ref Range: 7.0 - 25.0  25.3 (H)   Calcium Latest Ref Range: 8.6 - 10.5 mg/dL 10.3   eGFR Non  Am Latest Ref Range: >60 mL/min/1.73 67   Hemoglobin A1C Latest Ref Range: 4.80 - 5.60 % 6.60 (H)   Protime Latest Ref Range: 11.4 - 14.4 Seconds 13.1   INR Latest Ref Range: 0.85 - 1.16  1.02   PTT Latest Ref Range: 22.0 - 39.0 seconds 29.8   WBC Latest Ref Range: 3.40 - 10.80 10*3/mm3 12.53 (H)   RBC Latest Ref Range: 3.77 - 5.28 10*6/mm3 4.40   Hemoglobin Latest Ref Range: 12.0 - 15.9 g/dL 13.4   Hematocrit Latest Ref Range: 34.0 - 46.6 % 41.1   RDW Latest Ref Range: 12.3 - 15.4 % 12.6   MCV Latest Ref Range: 79.0 - 97.0 fL 93.4   MCH Latest Ref Range: 26.6 - 33.0 pg 30.5   MCHC Latest Ref Range: 31.5 - 35.7 g/dL 32.6   MPV Latest Ref Range: 6.0 - 12.0 fL 9.8   Platelets Latest Ref Range: 140 - 450 10*3/mm3 355       Assessment and Plan:     Status post total replacement of left hip    Primary osteoarthritis of left hip    Hypertension    Hyperlipidemia    Hypothyroid    Diabetes (CMS/HCC)    JIMMY on  CPAP    Asthma      Plan  1. PT/OT- WBAT LLE  2. Pain control-prns   3. IS-encourage  4. DVT proph- Mechs/ASA  5. Bowel regimen  6. Resume home medications as appropriate  7. Monitor post-op labs  8. DC planning for home, likely this evening    HTN, Hyperlipidemia  - Continue home Norvasc, Cozaar and statin  - Monitor BP   - Holding parameters for BP meds  - Labetalol PRN for SBP>170    Hypothyroid  - continue home synthroid    JIMMY  - CPAP at night    DM  - hgb A1c on 6/10/21 6.6  -Hold Metformin while inpatient  - Accu-Chek AC and HS with low dose SSI    Asthma  -Continue home inhalers, Mucinex and Singulair  -Frequent pulmonary toilet      Traci Bennett, ZEHRA  06/23/21  15:12 EDT

## 2021-06-23 NOTE — PLAN OF CARE
Problem: Adult Inpatient Plan of Care  Goal: Plan of Care Review  Flowsheets (Taken 6/23/2021 1325)  Progress: improving  Plan of Care Reviewed With:   patient   friend  Outcome Summary: PT eval complete. Pt ambulated 300 feet using RW, CGA, and one person to manage equipment. Pt ascended/descended 2 steps using RW, backwards technique, and CGA for safety. Gait/stair training limited by fatigue. Bed mobility and STS performed with CGA. No knee buckling noted. Reviewed HEP and posterior hip precautions via handout. Educated on safe car transfers. PADD score = 10. ADLs assessed, pt will require OT eval prior to potential d/c. Functionally, pt safe to d/c home with assist today from a PT perspective. Recommend HHPT.   Goal Outcome Evaluation:  Plan of Care Reviewed With: patient, friend        Progress: improving  Outcome Summary: PT eval complete. Pt ambulated 300 feet using RW, CGA, and one person to manage equipment. Pt ascended/descended 2 steps using RW, backwards technique, and CGA for safety. Gait/stair training limited by fatigue. Bed mobility and STS performed with CGA. No knee buckling noted. Reviewed HEP and posterior hip precautions via handout. Educated on safe car transfers. PADD score = 10. ADLs assessed, pt will require OT eval prior to potential d/c. Functionally, pt safe to d/c home with assist today from a PT perspective. Recommend HHPT.

## 2021-06-23 NOTE — DISCHARGE INSTR - ACTIVITY
Weight bearing as tolerated.  Use your rolling walker with ambulation until otherwise told by your provider.

## 2021-06-23 NOTE — H&P
Pre-Op H&P  Robina Dumont  [id number]  1965    Chief complaint: left hip pain     HPI:    Patient is a 56 y.o.female who presents with a history of pain in the left hip joint due to osteoarthritis. She is complaining of severe pain in the groin radiating down the left leg to the knee. She rates her pain a 5/10 on the pain scale. She is currently taking no medications for pain and is ambulating without an assistive device. She presents to the operating room today for surgical management with a left total hip arthroplasty     Review of Systems:  General ROS: negative for chills, fever or skin lesions;  No changes since last office visit.  Neg for recent sick exposure  Cardiovascular ROS: no chest pain or dyspnea on exertion  Respiratory ROS: no cough, shortness of breath, or wheezing    Allergies:   Allergies   Allergen Reactions   • Erythromycin GI Intolerance   • Sudanyl [Pseudoephedrine] Other (See Comments)     Facial swelling    • Prednisone Other (See Comments)     Hypertension         Home Meds:    No current facility-administered medications on file prior to encounter.     Current Outpatient Medications on File Prior to Encounter   Medication Sig Dispense Refill   • albuterol (ProAir RespiClick) 108 (90 Base) MCG/ACT inhaler Inhale 2 puffs Every 6 (Six) Hours As Needed.     • amLODIPine (NORVASC) 2.5 MG tablet Take 2.5 mg by mouth Daily.     • ARIPiprazole (ABILIFY) 2 MG tablet Take 2 mg by mouth Daily.     • atorvastatin (LIPITOR) 40 MG tablet Take 40 mg by mouth Daily.     • B-D UF III MINI PEN NEEDLES 31G X 5 MM misc USE 1 ONCE DAILY WITH VICTOZA INJECTION     • butalbital-aspirin-caffeine (FIORINAL) -40 MG capsule Take 1 capsule by mouth Every 8 (Eight) Hours As Needed.     • cetirizine (zyrTEC) 10 MG tablet Take 10 mg by mouth Daily.     • DULoxetine (CYMBALTA) 60 MG capsule Take 60 mg by mouth Daily.     • fluticasone-salmeterol (ADVAIR) 500-50 MCG/DOSE DISKUS Inhale 1 puff 2 (Two) Times  a Day.     • guaiFENesin (MUCINEX) 600 MG 12 hr tablet Take 1,200 mg by mouth 2 (Two) Times a Day As Needed for Cough or Congestion.     • levothyroxine (SYNTHROID, LEVOTHROID) 50 MCG tablet Take 50 mcg by mouth Daily.     • losartan-hydrochlorothiazide (HYZAAR) 100-25 MG per tablet Take 1 tablet by mouth Daily.     • melatonin 5 MG tablet tablet Take 10 mg by mouth Every Night.     • montelukast (SINGULAIR) 10 MG tablet Take 10 mg by mouth Daily.     • mupirocin (BACTROBAN) 2 % ointment Place into the nostril(s) as directed by provider 2 (Two) Times a Day. Apply pea-sized amount to each nostril twice daily for 5 days prior to surgery 22 g 0   • omeprazole (priLOSEC) 20 MG capsule Take 20 mg by mouth Daily.     • potassium chloride (MICRO-K) 10 MEQ CR capsule Take 10 mEq by mouth Daily.     • traMADol (ULTRAM) 50 MG tablet Take 1 tablet by mouth Every 6 (Six) Hours As Needed for Moderate Pain . (Patient taking differently: Take 50 mg by mouth Every 6 (Six) Hours As Needed for Moderate Pain . Insurance would not cover; going to try to go through PCP) 30 tablet 0   • Victoza 18 MG/3ML solution pen-injector injection Inject 0.6 mg under the skin into the appropriate area as directed Daily.     • Viibryd 40 MG tablet tablet Take 40 mg by mouth Daily.     • zolpidem (AMBIEN) 10 MG tablet Take 10 mg by mouth Every Night.     • Chlorhexidine Gluconate (Antiseptic Skin Cleanser) 4 % solution Apply  topically to the appropriate area as directed Daily As Needed for Wound Care. Shower daily with hibiclens solution as directed 5 days prior to surgery. 237 mL 0       PMH:   Past Medical History:   Diagnosis Date   • Anxiety    • Anxiety and depression    • Arthritis    • Asthma    • Depression    • Diabetes mellitus (CMS/HCC)    • Disease of thyroid gland    • GERD (gastroesophageal reflux disease)    • Hyperlipidemia    • Hypertension    • Migraines    • Sleep apnea     c-pap setting reported as 13      PSH:    Past Surgical  "History:   Procedure Laterality Date   • ABDOMINAL SURGERY      laparoscopic expolratory   • BLEPHAROPLASTY Bilateral    • CATARACT EXTRACTION, BILATERAL     • CERVIX LESION DESTRUCTION     • COLONOSCOPY  2017   • COSMETIC SURGERY Bilateral     blepharoplasty    • EYE SURGERY Bilateral     cataract   • FINGER/THUMB ARTHROPLASTY Bilateral    • GASTRIC BYPASS     • JOINT REPLACEMENT Bilateral     thumb    • KNEE ARTHROSCOPY Right        Immunization History:  Influenza: 2020  Pneumococcal: patient unsure of date   Tetanus: <10 years     Social History:   Tobacco:   Social History     Tobacco Use   Smoking Status Never Smoker   Smokeless Tobacco Never Used      Alcohol:     Social History     Substance and Sexual Activity   Alcohol Use Yes    Comment: rarely       Vitals:           /99 (BP Location: Right arm, Patient Position: Lying)   Pulse 97   Resp 18   Ht 154.9 cm (61\")   Wt 94.8 kg (209 lb)   SpO2 96%   BMI 39.49 kg/m²     Physical Exam:  General Appearance:    Alert, cooperative, no distress, appears stated age   Head:    Normocephalic, without obvious abnormality, atraumatic   Lungs:     Clear to auscultation bilaterally, respirations unlabored    Heart:   Regular rate and rhythm, S1 and S2 normal, no murmur, rub    or gallop    Abdomen:    Soft, nontender.  +bowel sounds   Breast Exam:    deferred   Genitalia:    deferred   Extremities:   Extremities normal, atraumatic, no cyanosis or edema   Skin:   Skin color, texture, turgor normal, no rashes or lesions   Neurologic:   Grossly intact   Results Review  LABS:  Lab Results   Component Value Date    WBC 12.53 (H) 06/10/2021    HGB 13.4 06/10/2021    HCT 41.1 06/10/2021    MCV 93.4 06/10/2021     06/10/2021    NEUTROABS 9.35 (H) 06/10/2021    GLUCOSE 115 (H) 06/10/2021    BUN 22 (H) 06/10/2021    CREATININE 0.87 06/10/2021    EGFRIFNONA 67 06/10/2021     06/10/2021    K 3.9 06/10/2021    CL 96 (L) 06/10/2021    CO2 28.0 06/10/2021    " CALCIUM 10.3 06/10/2021    PTT 29.8 06/10/2021    INR 1.02 06/10/2021       RADIOLOGY:  No radiology results for the last 3 days       Cancer Staging (if applicable)  Cancer Patient: __ yes __no __unknown; If yes, clinical stage T:__ N:__M:__, stage group or __N/A    Impression: Primary osteoarthritis of left hip    Plan: Left total hip arthroplasty     Ángel Olvera PA-C   6/23/2021   06:46 EDT

## 2021-06-23 NOTE — CASE MANAGEMENT/SOCIAL WORK
Discharge Planning Assessment  [facility]     Patient Name: Robina Dumont  MRN: [id number]  Today's Date: 6/23/2021    Admit Date: 6/23/2021        Discharge Plan     Row Name 06/23/21 1534       Plan    Plan  Home with friends assistance and outpatient physical therapy with [facility]    Plan Comments  Same Day Surgery. CM spoke with patient at bedside. Friend at bedside. Patient resides in [facility] with her spouse. Patient states she will be staying in [facility] [facility] with a friend. Patient is independent with ADL's, PTA. Patient has a rolling walker in her home. Patient would like outpatient physical therapy at [facility]. CM called and spoke with Erica. Referral faxed to her. She will follow up with patient in am. Friend to provide transportation to home.    Final Discharge Disposition Code  01 - home or self-care        Continued Care and Services - Admitted Since 6/23/2021     Therapy Coordination complete    Service Provider Request Status Selected Services Address Phone Fax Patient Preferred    [facility]   Selected Outpatient Physical Therapy [address] [address] [phone] --              Expected Discharge Date and Time     Expected Discharge Date Expected Discharge Time    Jun 23, 2021         Demographic Summary     Row Name 06/23/21 1534       General Information    Admission Type  same day        Functional Status    No documentation.       Psychosocial    No documentation.       Abuse/Neglect    No documentation.       Legal    No documentation.       Substance Abuse    No documentation.       Patient Forms    No documentation.           Amy Ornelas RN

## 2021-06-23 NOTE — BRIEF OP NOTE
TOTAL HIP ARTHROPLASTY  Progress Note    Robina Dumont  6/23/2021    Pre-op Diagnosis:   Primary osteoarthritis of left hip [M16.12]       Post-Op Diagnosis Codes:     * Primary osteoarthritis of left hip [M16.12]    Procedure/CPT® Codes:  DC TOTAL HIP ARTHROPLASTY [57723]      Procedure(s):  TOTAL HIP ARTHROPLASTY LEFT    Surgeon(s):  Jeremiah Alcala MD    Anesthesia: General    Staff:   Circulator: Carl Childesr RN  Radiology Technologist: Robert Brady  Scrchristine Person: Kelly Hyman; Wendy Arevalo  Vendor Representative: Yuri Scherer Assistant: Faith Gaspar PCT  Assistant: Ángel Olvera PA-C  Assistant: Ángel Olvera PA-C      Estimated Blood Loss: 200 mL    Urine Voided: * No values recorded between 6/23/2021  7:24 AM and 6/23/2021  9:45 AM *    Specimens:                None          Drains: * No LDAs found *    Findings: End-stage osteoarthritis left hip    Complications: None apparent    Assistant: Ángel Olvera PA-C  was responsible for performing the following activities: Retraction, Suction, Irrigation, Suturing, Closing and Placing Dressing and their skilled assistance was necessary for the success of this case.    Jeremiah Alcala MD     Date: 6/23/2021  Time: 10:15 EDT

## 2021-06-23 NOTE — ANESTHESIA PREPROCEDURE EVALUATION
Anesthesia Evaluation     Patient summary reviewed and Nursing notes reviewed   NPO Solid Status: > 8 hours  NPO Liquid Status: > 2 hours           Airway   Mallampati: I  TM distance: >3 FB  Neck ROM: full  No difficulty expected  Dental      Pulmonary     breath sounds clear to auscultation  (+) asthma,sleep apnea on CPAP,   Cardiovascular     ECG reviewed  Rhythm: regular  Rate: normal    (+) hypertension,       Neuro/Psych  GI/Hepatic/Renal/Endo    (+)  GERD,  diabetes mellitus,     Musculoskeletal     Abdominal    Substance History      OB/GYN          Other   arthritis,                      Anesthesia Plan    ASA 3     spinal     intravenous induction     Anesthetic plan, all risks, benefits, and alternatives have been provided, discussed and informed consent has been obtained with: patient.    Plan discussed with CRNA.

## 2021-06-23 NOTE — THERAPY EVALUATION
Patient Name: Robina Dumont  : 1965    MRN: [id number]                              Today's Date: 2021       Admit Date: 2021    Visit Dx:     ICD-10-CM ICD-9-CM   1. Primary osteoarthritis of left hip  M16.12 715.15     Patient Active Problem List   Diagnosis   • Primary osteoarthritis of left hip   • Status post total replacement of left hip   • Hypertension   • Hyperlipidemia   • Hypothyroid   • Diabetes (CMS/HCC)   • JIMMY on CPAP   • Asthma     Past Medical History:   Diagnosis Date   • Anxiety    • Anxiety and depression    • Arthritis    • Asthma    • Depression    • Diabetes mellitus (CMS/HCC)    • Disease of thyroid gland    • GERD (gastroesophageal reflux disease)    • Hyperlipidemia    • Hypertension    • Migraines    • Sleep apnea     c-pap setting reported as 13      Past Surgical History:   Procedure Laterality Date   • ABDOMINAL SURGERY      laparoscopic expolratory   • BLEPHAROPLASTY Bilateral    • CATARACT EXTRACTION, BILATERAL     • CERVIX LESION DESTRUCTION     • COLONOSCOPY     • COSMETIC SURGERY Bilateral     blepharoplasty    • EYE SURGERY Bilateral     cataract   • FINGER/THUMB ARTHROPLASTY Bilateral    • GASTRIC BYPASS     • JOINT REPLACEMENT Bilateral     thumb    • KNEE ARTHROSCOPY Right      General Information     Row Name 21 1325          Physical Therapy Time and Intention    Document Type  evaluation  -ERICH     Mode of Treatment  individual therapy;physical therapy  -ERICH     Row Name 21 1325          General Information    Patient Profile Reviewed  yes  -ERICH     Prior Level of Function  min assist:;all household mobility;transfer;bed mobility;ADL's  -ERICH     Existing Precautions/Restrictions  fall;left;hip, posterior  -ERICH     Barriers to Rehab  none identified  -ERICH     Row Name 21 1325          Living Environment    Lives With  spouse  -ERICH     Row Name 21 1325          Home Main Entrance    Number of Stairs, Main Entrance  two landing  between steps  -ERICH     Stair Railings, Main Entrance  none  -ERICH     Row Name 06/23/21 1325          Stairs Within Home, Primary    Stairs, Within Home, Primary  1  -ERICH     Number of Stairs, Within Home, Primary  one  -ERICH     Row Name 06/23/21 1325          Cognition    Orientation Status (Cognition)  oriented x 4  -ERICH     Row Name 06/23/21 1325          Safety Issues, Functional Mobility    Safety Issues Affecting Function (Mobility)  safety precaution awareness;safety precautions follow-through/compliance  -     Impairments Affecting Function (Mobility)  endurance/activity tolerance;strength;pain;range of motion (ROM)  -       User Key  (r) = Recorded By, (t) = Taken By, (c) = Cosigned By    Initials Name Provider Type    ERICH Cheo Huynh, PT Physical Therapist        Mobility     Row Name 06/23/21 1325          Bed Mobility    Bed Mobility  scooting/bridging;supine-sit  -ERICH     Scooting/Bridging [place] (Bed Mobility)  verbal cues;contact guard  -     Supine-Sit [place] (Bed Mobility)  verbal cues;contact guard  -     Assistive Device (Bed Mobility)  bed rails;head of bed elevated  -     Comment (Bed Mobility)  Verbal cues for LE sequencing off of EOB and trunk control into sitting  -     Row Name 06/23/21 1325          Transfers    Comment (Transfers)  Verbal cues for safe hand placement during standing/sitting and maintaining posterior hip precautions.  -     Row Name 06/23/21 1325          Sit-Stand Transfer    Sit-Stand [place] (Transfers)  verbal cues;contact guard  -     Assistive Device (Sit-Stand Transfers)  walker, front-wheeled  -     Row Name 06/23/21 1325          Gait/Stairs (Locomotion)    [place] Level (Gait)  verbal cues;contact guard;1 person to manage equipment  -     Assistive Device (Gait)  walker, front-wheeled  -     Distance in Feet (Gait)  300 feet  -     Deviations/Abnormal Patterns (Gait)  bilateral deviations;loan decreased;gait speed  decreased;stride length decreased  -ERICH     Bilateral Gait Deviations  forward flexed posture  -ERICH     Left Sided Gait Deviations  heel strike decreased;weight shift ability decreased  -ERICH     [place] Level (Stairs)  verbal cues;contact guard  -ERICH     Assistive Device (Stairs)  walker, front-wheeled  -ERICH     Handrail Location (Stairs)  none  -ERICH     Number of Steps (Stairs)  2 backwards technique  -ERICH     Ascending Technique (Stairs)  step-to-step  -ERICH     Descending Technique (Stairs)  step-to-step  -ERICH     Comment (Gait/Stairs)  Pt ambulated with step through pattern and decreased speed. Verbal cues for maintaining upright posture, body within walker, increase step length, and maintaining foot position straight ahead. Pt ascended/descended 2 steps using RW, backwards technique, and CGA for safety. Gait/stair training limited by fatigue. No knee buckling noted.  -     Row Name 06/23/21 1325          Mobility    Extremity Weight-bearing Status  left lower extremity  -ERICH     Left Lower Extremity (Weight-bearing Status)  weight-bearing as tolerated (WBAT)  -       User Key  (r) = Recorded By, (t) = Taken By, (c) = Cosigned By    Initials Name Provider Type    ERICH Cheo Huynh, PT Physical Therapist        Obj/Interventions     Row Name 06/23/21 1325          Range of Motion Comprehensive    General Range of Motion  lower extremity range of motion deficits identified  -     Comment, General Range of Motion  R LE AROM WFL; L LE AROM impaired 25%; able to actively DF/PF  -     Row Name 06/23/21 1325          Strength Comprehensive (MMT)    General Manual Muscle Testing (MMT) Assessment  lower extremity strength deficits identified  -     Comment, General Manual Muscle Testing (MMT) Assessment  R LE functionally 4+/5; L LE functionally 4-/5;  -     Row Name 06/23/21 1325          Motor Skills    Therapeutic Exercise  hip;knee;ankle  -     Row Name 06/23/21 1325          Hip (Therapeutic Exercise)    Hip  (Therapeutic Exercise)  isometric exercises  -     Hip Isometrics (Therapeutic Exercise)  gluteal sets;10 repetitions  -     Row Name 06/23/21 1325          Knee (Therapeutic Exercise)    Knee (Therapeutic Exercise)  isometric exercises  -     Knee Isometrics (Therapeutic Exercise)  quad sets;10 repetitions  -[facility] Name 06/23/21 1325          Ankle (Therapeutic Exercise)    Ankle (Therapeutic Exercise)  AROM (active range of motion)  -     Ankle AROM (Therapeutic Exercise)  bilateral;dorsiflexion;plantarflexion;10 repetitions  -[facility] Name 06/23/21 1325          Sensory Assessment (Somatosensory)    Sensory Assessment (Somatosensory)  LE sensation intact  -       User Key  (r) = Recorded By, (t) = Taken By, (c) = Cosigned By    Initials Name Provider Type    ERICH Cheo Huynh, PT Physical Therapist        Goals/Plan     [facility] Name 06/23/21 1325          Bed Mobility Goal 1 (PT)    Activity/Assistive Device (Bed Mobility Goal 1, PT)  sit to supine/supine to sit  -     [place] Level/Cues Needed (Bed Mobility Goal 1, PT)  modified independence  -ERICH     Time Frame (Bed Mobility Goal 1, PT)  long term goal (LTG)  -[facility] Name 06/23/21 1325          Transfer Goal 1 (PT)    Activity/Assistive Device (Transfer Goal 1, PT)  sit-to-stand/stand-to-sit;walker, rolling  -ERICH     [place] Level/Cues Needed (Transfer Goal 1, PT)  modified independence  -ERICH     Time Frame (Transfer Goal 1, PT)  long term goal (LTG);3 days  -[facility] Name 06/23/21 1325          Gait Training Goal 1 (PT)    Activity/Assistive Device (Gait Training Goal 1, PT)  gait (walking locomotion);walker, rolling  -ERICH     [place] Level (Gait Training Goal 1, PT)  modified independence  -ERICH     Distance (Gait Training Goal 1, PT)  500 feet  -ERICH     Time Frame (Gait Training Goal 1, PT)  long term goal (LTG);3 days  -[facility] Name 06/23/21 1325          Stairs Goal 1 (PT)    Activity/Assistive Device (Stairs Goal 1, PT)  stairs,  all skills;walker, rolling  -ERICH     [place] Level/Cues Needed (Stairs Goal 1, PT)  modified independence  -ERICH     Number of Stairs (Stairs Goal 1, PT)  2 backwards technique  -ERICH     Time Frame (Stairs Goal 1, PT)  long term goal (LTG);3 days  -ERICH       User Key  (r) = Recorded By, (t) = Taken By, (c) = Cosigned By    Initials Name Provider Type    Cheo Anglin, PT Physical Therapist        Clinical Impression     Row Name 06/23/21 1325          Pain    Additional Documentation  Pain Scale: Numbers Pre/Post-Treatment (Group)  -ERICH     Row Name 06/23/21 1325          Pain Scale: Numbers Pre/Post-Treatment    Pretreatment Pain Rating  3/10  -ERICH     Posttreatment Pain Rating  3/10  -ERICH     Pain Location - Side  Left  -ERICH     Pain Location - Orientation  generalized  -ERICH     Pain Location  hip  -ERICH     Pain Intervention(s)  Ambulation/increased activity;Repositioned;Cold applied  -ERICH     Row Name 06/23/21 1325          Therapy Assessment/Plan (PT)    Patient/Family Therapy Goals Statement (PT)  To return home  -ERICH     Rehab Potential (PT)  good, to achieve stated therapy goals  -ERICH     Criteria for Skilled Interventions Met (PT)  yes;meets criteria;skilled treatment is necessary  -ERICH     Row Name 06/23/21 1325          Positioning and Restraints    Pre-Treatment Position  in bed  -ERICH     Post Treatment Position  chair  -ERICH     In Chair  notified nsg;reclined;call light within reach;encouraged to call for assist;exit alarm on;with family/caregiver;legs elevated;compression device  -ERICH       User Key  (r) = Recorded By, (t) = Taken By, (c) = Cosigned By    Initials Name Provider Type    Cheo Anglin, PT Physical Therapist        Outcome Measures     Row Name 06/23/21 6099          How much help from another person do you currently need...    Turning from your back to your side while in flat bed without using bedrails?  3  -ERICH     Moving from lying on back to sitting on the side of a flat bed without bedrails?  3   -ERICH     Moving to and from a bed to a chair (including a wheelchair)?  3  -ERICH     Standing up from a chair using your arms (e.g., wheelchair, bedside chair)?  3  -ERICH     Climbing 3-5 steps with a railing?  3  -ERICH     To walk in hospital room?  3  -ERICH     AM-PAC 6 Clicks Score (PT)  18  -ERICH     Row Name 06/23/21 1325          PADD    Diagnosis  2  -ERICH     Gender  1  -ERICH     Age Group  2  -ERICH     Gait Distance  1  -ERICH     Assist Level  1  -ERICH     Home Support  3  -ERICH     PADD Score  10  -ERICH     Patient Preference  home with home health  -     Prediction by PADD Score  directly home (with home health or out-patient rehab)  -     Row Name 06/23/21 1325          Functional Assessment    Outcome Measure Options  AM-PAC 6 Clicks Basic Mobility (PT);PADD  -       User Key  (r) = Recorded By, (t) = Taken By, (c) = Cosigned By    Initials Name Provider Type    Cheo Anglin, BREE Physical Therapist        Physical Therapy Education                 Title: PT OT SLP Therapies (Done)     Topic: Physical Therapy (Done)     Point: Mobility training (Done)     Learning Progress Summary           Patient Acceptance, E,D,H, VU by  at 6/23/2021 1325    Comment: Educated on safe sequencing with bed mobility, ambulatory/car transfers, gait, and stair training. Reviewed HEP and hip precautions via handout.                   Point: Home exercise program (Done)     Learning Progress Summary           Patient Acceptance, E,D,H, VU by  at 6/23/2021 1325    Comment: Educated on safe sequencing with bed mobility, ambulatory/car transfers, gait, and stair training. Reviewed HEP and hip precautions via handout.                   Point: Body mechanics (Done)     Learning Progress Summary           Patient Acceptance, E,D,H, VU by  at 6/23/2021 1325    Comment: Educated on safe sequencing with bed mobility, ambulatory/car transfers, gait, and stair training. Reviewed HEP and hip precautions via handout.                   Point:  Precautions (Done)     Learning Progress Summary           Patient Acceptance, E,D,H, VU by ERICH at 6/23/2021 1325    Comment: Educated on safe sequencing with bed mobility, ambulatory/car transfers, gait, and stair training. Reviewed HEP and hip precautions via handout.                               User Key     Initials Effective Dates Name Provider Type Discipline     06/16/21 -  Cheo Huynh, PT Physical Therapist PT              PT Recommendation and Plan  Planned Therapy Interventions (PT): balance training, bed mobility training, gait training, home exercise program, patient/family education, transfer training, stair training, strengthening, ROM (range of motion)  Plan of Care Reviewed With: patient, friend  Progress: improving  Outcome Summary: PT eval complete. Pt ambulated 300 feet using RW, CGA, and one person to manage equipment. Pt ascended/descended 2 steps using RW, backwards technique, and CGA for safety. Gait/stair training limited by fatigue. Bed mobility and STS performed with CGA. No knee buckling noted. Reviewed HEP and posterior hip precautions via handout. Educated on safe car transfers. PADD score = 10. ADLs assessed, pt will require OT eval prior to potential d/c. Functionally, pt safe to d/c home with assist today from a PT perspective. Recommend HHPT.     Time Calculation:   PT Charges     Row Name 06/23/21 1325             Time Calculation    Start Time  1325  -ERICH      PT Received On  06/23/21  -ERICH      PT Goal Re-Cert Due Date  07/03/21  -ERICH         Time Calculation- PT    Total Timed Code Minutes- PT  10 minute(s)  -ERICH         Timed Charges    40475 - PT Therapeutic Exercise Minutes  2  -ERICH      43819 - Gait Training Minutes   8  -ERICH         Untimed Charges    PT Eval/Re-eval Minutes  33  -ERICH         Total Minutes    Timed Charges Total Minutes  10  -ERICH      Untimed Charges Total Minutes  33  -ERICH       Total Minutes  43  -ERICH        User Key  (r) = Recorded By, (t) = Taken By, (c) =  Cosigned By    Initials Name Provider Type    ERICH Cheo Huynh, PT Physical Therapist        Therapy Charges for Today     Code Description Service Date Service Provider Modifiers Qty    01416589528 HC GAIT TRAINING EA 15 MIN 6/23/2021 Cheo Huynh, PT GP 1    [id number] HC PT EVAL LOW COMPLEXITY 3 6/23/2021 Cheo Huynh, PT GP 1    [id number] HC PT THER SUPP EA 15 MIN 6/23/2021 Cheo Huynh, PT GP 2          PT G-Codes  Outcome Measure Options: AM-PAC 6 Clicks Basic Mobility (PT), PADD  AM-PAC 6 Clicks Score (PT): 18    Cheo Huynh PT  6/23/2021

## 2021-06-23 NOTE — DISCHARGE PLACEMENT REQUEST
"Robina Dumont (56 y.o. Female)     Amy Ornelas RN  P: [phone]    Date of Birth Social Security Number Address Home Phone MRN    1965  [address]   [facility] [address] [phone] [id number]    [employer] Marital Status          None        Admission Date Admission Type Admitting Provider Attending Provider Department, Room/Bed    6/23/21 Elective Jeremiah Alcala MD Luckett, Matthew R, MD [facility] 3G, S361/1    Discharge Date Discharge Disposition Discharge Destination         Home or Self Care              Attending Provider: Jeremiah Alcala MD    Allergies: Erythromycin, Sudanyl [Pseudoephedrine], Prednisone    Isolation: None   Infection: None   Code Status: Not on file    Ht: 154.9 cm (61\")   Wt: 94.8 kg (209 lb)    Admission Cmt: None   Principal Problem: Status post total replacement of left hip [Z96.642]                 Active Insurance as of 6/23/2021     Primary Coverage     Payor Plan Insurance Group Employer/Plan Group    [employer] BY PALACIO [id number]     Payor Plan Address Payor Plan Phone Number Payor Plan Fax Number Effective Dates    [address]   1/1/2021 - None Entered    [place] [address]       Subscriber Name Subscriber Birth Date Member ID       BILLROBINA GOLDBERG 1965 [id number]                 Emergency Contacts      (Rel.) Home Phone Work Phone Mobile Phone    LUIS F DUMONT (Spouse) -- -- [phone]            Insurance Information                [employer] BY PIA/[employer] BY PIA Phone:     Subscriber: Robina Dumont Subscriber#: [id number]    Group#: [id number] Precert#:           Physician Progress Notes (last 24 hours) (Notes from 06/22/21 1532 through 06/23/21 1532)    No notes of this type exist for this encounter.         Consult Notes (last 24 hours) (Notes from 06/22/21 1532 through 06/23/21 1532)    No notes of this type exist for this encounter.          "   Physical Therapy Notes (last 24 hours) (Notes from 21 1532 through 21 1532)      Cheo Huynh, PT at 21 1325  Version 1 of          Problem: Adult Inpatient Plan of Care  Goal: Plan of Care Review  Flowsheets (Taken 2021 1325)  Progress: improving  Plan of Care Reviewed With:  • patient  • friend  Outcome Summary: PT eval complete. Pt ambulated 300 feet using RW, CGA, and one person to manage equipment. Pt ascended/descended 2 steps using RW, backwards technique, and CGA for safety. Gait/stair training limited by fatigue. Bed mobility and STS performed with CGA. No knee buckling noted. Reviewed HEP and posterior hip precautions via handout. Educated on safe car transfers. PADD score = 10. ADLs assessed, pt will require OT eval prior to potential d/c. Functionally, pt safe to d/c home with assist today from a PT perspective. Recommend HHPT.   Goal Outcome Evaluation:  Plan of Care Reviewed With: patient, friend        Progress: improving  Outcome Summary: PT eval complete. Pt ambulated 300 feet using RW, CGA, and one person to manage equipment. Pt ascended/descended 2 steps using RW, backwards technique, and CGA for safety. Gait/stair training limited by fatigue. Bed mobility and STS performed with CGA. No knee buckling noted. Reviewed HEP and posterior hip precautions via handout. Educated on safe car transfers. PADD score = 10. ADLs assessed, pt will require OT eval prior to potential d/c. Functionally, pt safe to d/c home with assist today from a PT perspective. Recommend HHPT.    Electronically signed by Cheo Huynh, PT at 21 9622     Cheo Huynh, PT at 21 1325  Version 1 of 1         Patient Name: Robina Dumont  : 1965    MRN: [id number]                              Today's Date: 2021       Admit Date: 2021    Visit Dx:     ICD-10-CM ICD-9-CM   1. Primary osteoarthritis of left hip  M16.12 715.15     Patient Active Problem List   Diagnosis   • Primary  osteoarthritis of left hip   • Status post total replacement of left hip   • Hypertension   • Hyperlipidemia   • Hypothyroid   • Diabetes (CMS/HCC)   • JIMMY on CPAP   • Asthma     Past Medical History:   Diagnosis Date   • Anxiety    • Anxiety and depression    • Arthritis    • Asthma    • Depression    • Diabetes mellitus (CMS/HCC)    • Disease of thyroid gland    • GERD (gastroesophageal reflux disease)    • Hyperlipidemia    • Hypertension    • Migraines    • Sleep apnea     c-pap setting reported as 13      Past Surgical History:   Procedure Laterality Date   • ABDOMINAL SURGERY      laparoscopic expolratory   • BLEPHAROPLASTY Bilateral    • CATARACT EXTRACTION, BILATERAL     • CERVIX LESION DESTRUCTION     • COLONOSCOPY  2017   • COSMETIC SURGERY Bilateral     blepharoplasty    • EYE SURGERY Bilateral     cataract   • FINGER/THUMB ARTHROPLASTY Bilateral    • GASTRIC BYPASS     • JOINT REPLACEMENT Bilateral     thumb    • KNEE ARTHROSCOPY Right      General Information     Row Name 06/23/21 1325          Physical Therapy Time and Intention    Document Type  evaluation  -ERICH     Mode of Treatment  individual therapy;physical therapy  -     Row Name 06/23/21 1325          General Information    Patient Profile Reviewed  yes  -ERICH     Prior Level of Function  min assist:;all household mobility;transfer;bed mobility;ADL's  -ERICH     Existing Precautions/Restrictions  fall;left;hip, posterior  -ERICH     Barriers to Rehab  none identified  -ERICH     Row Name 06/23/21 1325          Living Environment    Lives With  spouse  -ERICH     Row Name 06/23/21 1325          Home Main Entrance    Number of Stairs, Main Entrance  two landing between steps  -ERICH     Stair Railings, Main Entrance  none  -ERICH     Row Name 06/23/21 1325          Stairs Within Home, Primary    Stairs, Within Home, Primary  1  -ERICH     Number of Stairs, Within Home, Primary  one  -ERICH     Row Name 06/23/21 1325          Cognition    Orientation Status (Cognition)   oriented x 4  -ERICH     Row Name 06/23/21 1325          Safety Issues, Functional Mobility    Safety Issues Affecting Function (Mobility)  safety precaution awareness;safety precautions follow-through/compliance  -     Impairments Affecting Function (Mobility)  endurance/activity tolerance;strength;pain;range of motion (ROM)  -       User Key  (r) = Recorded By, (t) = Taken By, (c) = Cosigned By    Initials Name Provider Type     Cheo Huynh PT Physical Therapist        Mobility     Row Name 06/23/21 1325          Bed Mobility    Bed Mobility  scooting/bridging;supine-sit  -ERICH     Scooting/Bridging [place] (Bed Mobility)  verbal cues;contact guard  -     Supine-Sit [place] (Bed Mobility)  verbal cues;contact guard  -     Assistive Device (Bed Mobility)  bed rails;head of bed elevated  -     Comment (Bed Mobility)  Verbal cues for LE sequencing off of EOB and trunk control into sitting  -     Row Name 06/23/21 1325          Transfers    Comment (Transfers)  Verbal cues for safe hand placement during standing/sitting and maintaining posterior hip precautions.  -     Row Name 06/23/21 1325          Sit-Stand Transfer    Sit-Stand [place] (Transfers)  verbal cues;contact guard  -     Assistive Device (Sit-Stand Transfers)  walker, front-wheeled  -ERICH     Row Name 06/23/21 1325          Gait/Stairs (Locomotion)    [place] Level (Gait)  verbal cues;contact guard;1 person to manage equipment  -     Assistive Device (Gait)  walker, front-wheeled  -ERICH     Distance in Feet (Gait)  300 feet  -     Deviations/Abnormal Patterns (Gait)  bilateral deviations;loan decreased;gait speed decreased;stride length decreased  -     Bilateral Gait Deviations  forward flexed posture  -     Left Sided Gait Deviations  heel strike decreased;weight shift ability decreased  -     [place] Level (Stairs)  verbal cues;contact guard  -     Assistive Device (Stairs)  walker, front-wheeled  -ERICH      Handrail Location (Stairs)  none  -ERICH     Number of Steps (Stairs)  2 backwards technique  -ERICH     Ascending Technique (Stairs)  step-to-step  -ERICH     Descending Technique (Stairs)  step-to-step  -ERICH     Comment (Gait/Stairs)  Pt ambulated with step through pattern and decreased speed. Verbal cues for maintaining upright posture, body within walker, increase step length, and maintaining foot position straight ahead. Pt ascended/descended 2 steps using RW, backwards technique, and CGA for safety. Gait/stair training limited by fatigue. No knee buckling noted.  -[facility] Name 06/23/21 1325          Mobility    Extremity Weight-bearing Status  left lower extremity  -     Left Lower Extremity (Weight-bearing Status)  weight-bearing as tolerated (WBAT)  -       User Key  (r) = Recorded By, (t) = Taken By, (c) = Cosigned By    Initials Name Provider Type    ERICH Cheo Huynh, PT Physical Therapist        Obj/Interventions     [facility] Name 06/23/21 1325          Range of Motion Comprehensive    General Range of Motion  lower extremity range of motion deficits identified  -     Comment, General Range of Motion  R LE AROM WFL; L LE AROM impaired 25%; able to actively DF/PF  -[facility] Name 06/23/21 1325          Strength Comprehensive (MMT)    General Manual Muscle Testing (MMT) Assessment  lower extremity strength deficits identified  -     Comment, General Manual Muscle Testing (MMT) Assessment  R LE functionally 4+/5; L LE functionally 4-/5;  -[facility] Name 06/23/21 1325          Motor Skills    Therapeutic Exercise  hip;knee;ankle  -[facility] Name 06/23/21 1325          Hip (Therapeutic Exercise)    Hip (Therapeutic Exercise)  isometric exercises  -     Hip Isometrics (Therapeutic Exercise)  gluteal sets;10 repetitions  -[facility] Name 06/23/21 1325          Knee (Therapeutic Exercise)    Knee (Therapeutic Exercise)  isometric exercises  -     Knee Isometrics (Therapeutic Exercise)  quad sets;10 repetitions   -     Row Name 06/23/21 [facility]          Ankle (Therapeutic Exercise)    Ankle (Therapeutic Exercise)  AROM (active range of motion)  -     Ankle AROM (Therapeutic Exercise)  bilateral;dorsiflexion;plantarflexion;10 repetitions  -     Row Name 06/23/21 [facility]          Sensory Assessment (Somatosensory)    Sensory Assessment (Somatosensory)  LE sensation intact  -       User Key  (r) = Recorded By, (t) = Taken By, (c) = Cosigned By    Initials Name Provider Type    ERICH Cheo Huynh, PT Physical Therapist        Goals/Plan     Row Name 06/23/21 [facility]          Bed Mobility Goal 1 (PT)    Activity/Assistive Device (Bed Mobility Goal 1, PT)  sit to supine/supine to sit  -ERICH     [place] Level/Cues Needed (Bed Mobility Goal 1, PT)  modified independence  -ERICH     Time Frame (Bed Mobility Goal 1, PT)  long term goal (LTG)  -     Row Name 06/23/21 [facility]          Transfer Goal 1 (PT)    Activity/Assistive Device (Transfer Goal 1, PT)  sit-to-stand/stand-to-sit;walker, rolling  -ERICH     [place] Level/Cues Needed (Transfer Goal 1, PT)  modified independence  -ERICH     Time Frame (Transfer Goal 1, PT)  long term goal (LTG);3 days  -     Row Name 06/23/21 [facility]          Gait Training Goal 1 (PT)    Activity/Assistive Device (Gait Training Goal 1, PT)  gait (walking locomotion);walker, rolling  -ERICH     [place] Level (Gait Training Goal 1, PT)  modified independence  -ERICH     Distance (Gait Training Goal 1, PT)  500 feet  -ERICH     Time Frame (Gait Training Goal 1, PT)  long term goal (LTG);3 days  -     Row Name 06/23/21 [facility]          Stairs Goal 1 (PT)    Activity/Assistive Device (Stairs Goal 1, PT)  stairs, all skills;walker, rolling  -ERICH     [place] Level/Cues Needed (Stairs Goal 1, PT)  modified independence  -ERICH     Number of Stairs (Stairs Goal 1, PT)  2 backwards technique  -ERICH     Time Frame (Stairs Goal 1, PT)  long term goal (LTG);3 days  -       User Key  (r) = Recorded By, (t) = Taken By, (c) =  Cosigned By    Initials Name Provider Type    Cheo Anglin, PT Physical Therapist        Clinical Impression     Row Name 06/23/21 1325          Pain    Additional Documentation  Pain Scale: Numbers Pre/Post-Treatment (Group)  -     Row Name 06/23/21 1325          Pain Scale: Numbers Pre/Post-Treatment    Pretreatment Pain Rating  3/10  -ERICH     Posttreatment Pain Rating  3/10  -ERICH     Pain Location - Side  Left  -ERICH     Pain Location - Orientation  generalized  -ERICH     Pain Location  hip  -ERICH     Pain Intervention(s)  Ambulation/increased activity;Repositioned;Cold applied  -ERICH     Row Name 06/23/21 1325          Therapy Assessment/Plan (PT)    Patient/Family Therapy Goals Statement (PT)  To return home  -ERICH     Rehab Potential (PT)  good, to achieve stated therapy goals  -ERICH     Criteria for Skilled Interventions Met (PT)  yes;meets criteria;skilled treatment is necessary  -ERICH     Row Name 06/23/21 1325          Positioning and Restraints    Pre-Treatment Position  in bed  -ERICH     Post Treatment Position  chair  -ERICH     In Chair  notified nsg;reclined;call light within reach;encouraged to call for assist;exit alarm on;with family/caregiver;legs elevated;compression device  -ERICH       User Key  (r) = Recorded By, (t) = Taken By, (c) = Cosigned By    Initials Name Provider Type    Cheo Anglin, PT Physical Therapist        Outcome Measures     Row Name 06/23/21 1325          How much help from another person do you currently need...    Turning from your back to your side while in flat bed without using bedrails?  3  -ERICH     Moving from lying on back to sitting on the side of a flat bed without bedrails?  3  -ERICH     Moving to and from a bed to a chair (including a wheelchair)?  3  -ERICH     Standing up from a chair using your arms (e.g., wheelchair, bedside chair)?  3  -ERICH     Climbing 3-5 steps with a railing?  3  -ERICH     To walk in hospital room?  3  -ERICH     AM-PAC 6 Clicks Score (PT)  18  -ERICH     Row Name 06/23/21  1325          PADD    Diagnosis  2  -     Gender  1  -ERICH     Age Group  2  -     Gait Distance  1  -     Assist Level  1  -     Home Support  3  -     PADD Score  10  -ERICH     Patient Preference  home with home health  -ERICH     Prediction by PADD Score  directly home (with home health or out-patient rehab)  -ERICH     Row Name 06/23/21 1325          Functional Assessment    Outcome Measure Options  AM-PAC 6 Clicks Basic Mobility (PT);PADD  -ERICH       User Key  (r) = Recorded By, (t) = Taken By, (c) = Cosigned By    Initials Name Provider Type    Cheo Anglin, PT Physical Therapist        Physical Therapy Education                 Title: PT OT SLP Therapies (Done)     Topic: Physical Therapy (Done)     Point: Mobility training (Done)     Learning Progress Summary           Patient Acceptance, E,D,H, VU by ERICH at 6/23/2021 1325    Comment: Educated on safe sequencing with bed mobility, ambulatory/car transfers, gait, and stair training. Reviewed HEP and hip precautions via handout.                   Point: Home exercise program (Done)     Learning Progress Summary           Patient Acceptance, E,D,H, VU by ERICH at 6/23/2021 1325    Comment: Educated on safe sequencing with bed mobility, ambulatory/car transfers, gait, and stair training. Reviewed HEP and hip precautions via handout.                   Point: Body mechanics (Done)     Learning Progress Summary           Patient Acceptance, E,D,H, VU by ERICH at 6/23/2021 1325    Comment: Educated on safe sequencing with bed mobility, ambulatory/car transfers, gait, and stair training. Reviewed HEP and hip precautions via handout.                   Point: Precautions (Done)     Learning Progress Summary           Patient Acceptance, E,D,H, VU by ERICH at 6/23/2021 1325    Comment: Educated on safe sequencing with bed mobility, ambulatory/car transfers, gait, and stair training. Reviewed HEP and hip precautions via handout.                               User Key     Initials  Effective Dates Name Provider Type Discipline     06/16/21 -  Cheo Huynh, PT Physical Therapist PT              PT Recommendation and Plan  Planned Therapy Interventions (PT): balance training, bed mobility training, gait training, home exercise program, patient/family education, transfer training, stair training, strengthening, ROM (range of motion)  Plan of Care Reviewed With: patient, friend  Progress: improving  Outcome Summary: PT eval complete. Pt ambulated 300 feet using RW, CGA, and one person to manage equipment. Pt ascended/descended 2 steps using RW, backwards technique, and CGA for safety. Gait/stair training limited by fatigue. Bed mobility and STS performed with CGA. No knee buckling noted. Reviewed HEP and posterior hip precautions via handout. Educated on safe car transfers. PADD score = 10. ADLs assessed, pt will require OT eval prior to potential d/c. Functionally, pt safe to d/c home with assist today from a PT perspective. Recommend HHPT.     Time Calculation:   PT Charges     Row Name 06/23/21 1325             Time Calculation    Start Time  1325  -ERICH      PT Received On  06/23/21  -ERICH      PT Goal Re-Cert Due Date  07/03/21  -ERICH         Time Calculation- PT    Total Timed Code Minutes- PT  10 minute(s)  -ERICH         Timed Charges    68592 - PT Therapeutic Exercise Minutes  2  -ERICH      20148 - Gait Training Minutes   8  -ERICH         Untimed Charges    PT Eval/Re-eval Minutes  33  -ERICH         Total Minutes    Timed Charges Total Minutes  10  -ERICH      Untimed Charges Total Minutes  33  -ERICH       Total Minutes  43  -ERICH        User Key  (r) = Recorded By, (t) = Taken By, (c) = Cosigned By    Initials Name Provider Type    ERICH Cheo Huynh, PT Physical Therapist        Therapy Charges for Today     Code Description Service Date Service Provider Modifiers Qty    74957174122 HC GAIT TRAINING EA 15 MIN 6/23/2021 Cheo Huynh, PT GP 1    32076665079 HC PT EVAL LOW COMPLEXITY 3 6/23/2021 Cheo Huynh, PT GP 1     [id number]  PT THER SUPP EA 15 MIN 2021 Cheo Huynh, PT GP 2          PT G-Codes  Outcome Measure Options: AM-PAC 6 Clicks Basic Mobility (PT), PADD  AM-PAC 6 Clicks Score (PT): 18    Cheo Huynh PT  2021      Electronically signed by Cheo Huynh, PT at 21 1400       Occupational Therapy Notes (last 24 hours) (Notes from 21 1532 through 21 1532)    No notes exist for this encounter.           Discharge Summary    No notes of this type exist for this encounter.         Discharge Order (From admission, onward)     Start     Ordered    21 1514  Discharge patient  Once     Comments: After voids and clears PT   Expected Discharge Date: 21    Discharge Disposition: Home or Self Care    Physician of Record for Attribution - Please select from Treatment Team: PRISCILLA DU [[id number]]    Review needed by CMO to determine Physician of Record: No       Question Answer Comment   Physician of Record for Attribution - Please select from Treatment Team PRISCILLA DU    Review needed by CMO to determine Physician of Record No        21 1515              [address] [address]  Phone:  [phone]  Fax:   Date: 2021      Ambulatory Referral to Physical Therapy Evaluate and treat, POST OP     Patient:  Robina Dumont MRN:  [id number]   PO   [facility] [address] :  1965  SSN:    Phone: [phone] Sex:  F      INSURANCE PAYOR PLAN GROUP # SUBSCRIBER ID   Primary:    [facility] JAEL PALACIO [id number]      Referring Provider Information:  PRISCILLA DU Phone: [phone] Fax:       Referral Information:   # Visits:  1 Referral Type: Physical Therapy [AE1]   Urgency:  Routine Referral Reason: Specialty Services Required   Start Date: 2021 End Date:  To be determined by Insurer   Diagnosis: Status post total replacement of left hip (Z96.642 [ICD-10-CM]  V43.64 [ICD-9-CM])      Refer to Dept:   Refer to Provider:   Refer to Facility:       Specialty needed: Evaluate and treat  Specialty needed: POST OP     This document serves as a request of services and does not constitute Insurance authorization or approval of services.  To determine eligibility, please contact the members Insurance carrier to verify and review coverage.     If you have medical questions regarding this request for services. Please contact [address] at [phone] during normal business hours.       Verbal Order Mode: Verbal with readback   Authorizing Provider: Jeremiah Alcala MD  Authorizing Provider's NPI: [id number]     Order Entered By: Amy Ornelas RN 6/23/2021  3:31 PM

## 2021-06-23 NOTE — OP NOTE
OPERATIVE REPORT     DATE OF PROCEDURE: 6/23/2021    SURGEON: Jeremiah Alcala M.D.     ASSISTANT(S): Circulator: Carl Childers RN  Radiology Technologist: Robert Brady  Scrub Person: Kelly Hyman; Wendy Arevalo  Vendor Representative: Yuri Scherer  Nursing Assistant: Faith Gaspar PCT  Assistant: Ángel Olvera PA-C  Assistant: Ángel Olvera PA-C    Note-PA was utilized during the case to facilitate positioning the patient, exposure, retraction, placement of final components and definitive closure.    PREOPERATIVE DIAGNOSIS: Advanced degenerative joint disease of the left hip secondary to osteoarthritis    POSTOPERATIVE DIAGNOSIS: same     PROCEDURE: Left total Hip Arthroplasty     SURGICAL DETAILS:     APPROACH: Posterior    ANESTHESIA: General plus local periarticular block    PREOPERATIVE ANTIBIOTICS: Ancef 2 g IV    TRANEXAMIC ACID: IV    ESTIMATED BLOOD LOSS: 300 cc     SPECIMENS: None    IMPLANTS:   : [place]  Acetabular component: 46 mm Trident 2   Acetabular screws: 2  Acetabular liner: 36C MDM metal liner   Femoral component: Accolade  degree size 3  Femoral head: 22.2+0 mm metal head with 36C MDM polyhead      DRAINS: None    LOCAL INJECTION: 1 cc Toradol 30mg/ml, 4 cc duramorph 2mg/ml, 20 cc 0.5% ropivicaine, 20 cc 0.5% lidocaine with 1:200,000 epinephrine, 15 cc preservative free normal saline     MODIFIER(S): None    COMPLICATIONS: None apparent    INDICATIONS FOR PROCEDURE: This patient has a history of progressive left hip pain and arthritis. The hip pain is severe with activity and has progressed significantly. Non-operative treatment has been attempted, but has not improved or controlled symptoms during normal daily activities. Motion has become limited and rotation severely restricted. X-rays reveal moderate-to-severe eburnation of articular cartilage on the superior weight bearing surface of the hip with circumferential acetabular and femoral neck osteophytes  consistent with advanced hip osteoarthritis. A total hip arthroplasty was recommended at this time. The risks, benefits, alternatives, and potential complications of the arthroplasty surgery were discussed with the patient in detail to include but not limited to infection, bleeding, anesthesia risks, sciatic nerve palsy, instability/dislocation, limb length discrepancy, aseptic loosening, osteolysis, blood clots, continued pain, iatrogenic fracture, myocardial infarction, stroke, and death. Specific details of the procedure, hospitalization, recovery, rehabilitation, and long-term precautions were also provided. Pre-operative teaching was provided. Implant/prosthesis selection was outlined, and the many options available were explained; the final choice will be made at the time of the procedure to match the anatomy and condition of the bone, ligaments, tendons, and muscles. Understanding of all topics was conveyed to me by the patient, and consent was given to proceed with a left total hip arthroplasty. The patient completed preoperative medical optimization and risk assessment, joint arthroplasty education, and MRSA decolonization using a universal decolonization protocol. Perioperative blood management and the potential for blood transfusion were discussed with risks and options clearly outlined.     INTRAOPERATIVE FINDINGS: End-stage osteoarthritis left hip    PROCEDURE: The patient was identified in the preoperative holding area. The operative site was confirmed and marked. A sequential compression device was placed on the nonoperative leg. The risks, benefits, and alternatives to surgery were again confirmed with the patient and the patient wished to proceed. The patient was brought to the operating room and placed on the operating room table in the supine position. A huddle was performed with the patient and all vital surgical team members to confirm the correct operative site, procedure, anesthesia type, and  operative plan with the patient. After anesthesia was performed, the patient was positioned in the lateral decubitus position on the pegboard and secured with the operative side up. An axillary roll was placed in the axilla and all bony prominences and pressure points were checked and padded. A relative leg length assessment was carried out and markers were placed for intraoperative assessment. Intravenous antibiotic prophylaxis was given and confirmed with the anesthesia team.     The operative leg was prepped and draped in the usual sterile fashion. A surgical time out was performed immediately preceding the incision with all personnel in the operating room to again confirm patient identity, the correct operative site and extremity, correct radiographic studies, availability of appropriate surgical equipment and agreement on the planned procedure. A posterolateral approach to the hip was performed through an incision centered over the greater trochanter. The incision was carried through the subcutaneous tissue to the underlying fascia eyssi and gluteus stephanie fascia, which were incised and split posteriorly over the trochanter in the direction of the fibers. Hemostasis was obtained with electrocautery. The Charnley retractor was placed after carefully palpating the sciatic nerve which was protected throughout the case.     A standard posterior approach to the hip was performed by releasing the piriformis, short external rotators and posterior capsule and reflecting them posteriorly as a rectangular flap. The superior capsule was scarred down; it was released and excised. The labrum was split and the femoral head mobilized. The hip was then flexed, internally rotated and dislocated from the acetabulum without excessive force. Assessment of the femoral head revealed eburnation of the articular cartilage with complete loss of the weight bearing chondral surface. Osteophytes were present as well. Careful measurements  were performed using the center of the femoral head and the lesser trochanter as markers and a femoral neck cut was made according to the preoperative plan.     Attention was then turned to the acetabulum. Retractors were placed circumferentially for wide acetabular exposure. The labrum and osteophytes were debrided from the rim, and the medial wall was identified and the depth of the socket assessed by excising the pulvinar. Bleeders were controlled, especially the area of the obturator artery with the electrocautery. Acetabular reaming was then started with the hemispherical instrument matching the size of the excised femoral head. Sequential reaming of the acetabulum was then performed by increasing size in 2 mm increments.  Reaming was performed line to line. The reamers created an excellent hemispherical bed of bleeding cancellous bone. The cup was impacted into position, targeting 40-45 degrees of abduction and 20-25 degrees of anteversion, with an excellent press-fit. The press-fit was firm, stable, and apically seated. 2 screw(s) were used for additional support of the fixation. Further osteophyte debridement was done around the socket. All impinging soft tissue was removed from the edges of the socket. The MDM bearing/liner was then impacted into place and checked for stability.     Attention was then turned to the femur. The leg was positioned so access did not result in soft-tissue injury. The femoral preparation was started with a box osteotome. The medullary cavity of the femur was then entered and opened with hand reamers. Femoral stem broaches were then employed in an incremental fashion up to the final size, targeting 15-20 degrees of anteversion. The final broach was fully seated, had good rotational and axial stability, and was seated at the appropriate height in relation to the greater trochanter and the preoperative plan. Trial reduction was done. Excellent stability and range of motion was  achieved without impingement at any position. The hip was stable in full extension and external rotation as well as in flexion past 90 degrees, 20 degrees adduction and 60-70 degrees of internal rotation. Leg lengths were re-created within millimeters based on the markers and relative measurement. The hip was then dislocated and the trials removed. The wound was copiously irrigated, and the permanent femoral stem was then impacted down in approximately 15-20 degrees of anteversion. The press-fit was firm, and stable to axial and rotational force in all planes. The permanent femoral head was then impacted on the clean trunnion. The socket and wound were irrigated, suctioned, and inspected for debris. The final reduction was performed, and again leg length assessment and stability assessment of the hip were performed to confirm optimal component selection and stability in all planes without impingement when stressed to the extremes.     The wound was irrigated with dilute betadine solution as well as saline, and hemostasis obtained with electrocautery. A pain cocktail was injected into the pericapsular tissues. The posterior capsule, piriformis, and short external rotators were repaired utilizing #2 Ticron through drill holes in the greater trochanter. The sciatic nerve was palpated and found to be intact and the wound was irrigated. Instrument and sponge counts were completed and confirmed correct. The fascia yessi and gluteal fascia were closed with interrupted #1 Vicryl suture and oversewn with #2 Stratafix. The deep subcutaneous tissue was closed with running #1 Stratafix suture and the superficial subcutaneous tissue with interrupted 2-0 Vicryl suture. A 3-0 monocryl running stitch was used to close skin followed by skin glue adhesive to seal the wound. A silver impregnated dressing was then placed, followed by a sequential compression device to the operative limb, followed by an abduction pillow. The patient was  then returned to a supine position on the operating room table. The patient was sufficiently recovered from anesthesia, transferred to a hospital bed and taken to the PACU in stable condition.     One gram (1000 mg) of intravenous tranexamic acid was administered prior to incision. A second one gram (1000 mg) intravenous dose was given prior to wound closure.    No apparent complications occurred during the procedure Instrument, sponge and needle counts were correct x 2.     The patient underwent risk stratification preoperatively and aspirin was chosen for DVT prophylaxis. Delay in starting chemical prophylaxis for 23 hours from surgical incision was over concerns for hematoma formation and wound related issues.     POST OPERATIVE PLAN:   Protected weight bearing as tolerated   Posterior hip precautions x 6 weeks   PT/OT for mobilization and medical equipment needs   23 hours perioperative antibiotic prophylaxis   Pain control with PO/IV meds   Keep silver dressing in place for 7 days post op. Change dressing only if saturated.   SCD's to bilateral lower extremities   Social work for discharge planning needs   Follow up in 3 weeks for post operative wound check with XR AP pelvis.

## 2021-06-23 NOTE — ANESTHESIA POSTPROCEDURE EVALUATION
Patient: Robina Dumont    Procedure Summary     Date: 06/23/21 Room / Location:  [facility]    Anesthesia Start: 0724 Anesthesia Stop:     Procedure: TOTAL HIP ARTHROPLASTY LEFT (Left Hip) Diagnosis:       Primary osteoarthritis of left hip      (Primary osteoarthritis of left hip [M16.12])    Surgeons: Jeremiah Alcala MD Provider: Carlos Beckman MD    Anesthesia Type: spinal ASA Status: 3          Anesthesia Type: spinal    Vitals  Vitals Value Taken Time   /63 06/23/21 1041   Temp 98 °F (36.7 °C) 06/23/21 1041   Pulse 98 06/23/21 1041   Resp 14 06/23/21 1041   SpO2 100 % 06/23/21 1041           Post Anesthesia Care and Evaluation    Patient location during evaluation: PACU  Patient participation: complete - patient participated  Level of consciousness: awake and alert  Pain score: 0  Pain management: adequate  Airway patency: patent  Anesthetic complications: No anesthetic complications  PONV Status: none  Cardiovascular status: hemodynamically stable and acceptable  Respiratory status: nonlabored ventilation, acceptable and nasal cannula  Hydration status: acceptable    Comments: Pt transferred to PACU with O2. Vital signs stable. Report to PACU RN and care accepted.

## 2021-06-23 NOTE — DISCHARGE INSTRUCTIONS
"DISCHARGE INSTRUCTIONS   Dr. Alcala     Total Hip Replacement/Hip Hemiarthroplasty     Wound Care   1) Keep wound / incision area clean and dry.   2) Dressing to remain in place until post-operative day 7. Upon dressing removal, assess for wound drainage. If no drainage is present, keep wound / incision area open to air as much as possible. If drainage is present, place sterile dressing to cover wound and assess daily. If drainage continues to occur after post-operative day 14, call the office for an urgent appointment. (You should be seen in the clinic within 1-2 days of calling). DO NOT REMOVE SUTURES (IF PRESENT) UNDER ANY CIRCUMSTANCES PRIOR TO FOLLOW UP APPOINTMENT.  3) No baths or swimming until otherwise instructed. The wound must remain dry for 10 days after surgery. After 10 days, you may begin to shower only if no drainage is present. No submerging the wound under standing water until cleared by your physician (no baths, hot tubs, swimming pools, etc). Sponge baths are the best way to perform personal hygiene while at the same time protecting the wound from moisture.   4) Prior to showering, the wound must remain dry for 72 consecutive hours (no drainage whatsoever) prior to showering. If the wound drains or spots, the clock \"resets\" - make sure the wound has been drainage-free for 72 consecutive hours.   5) Once you are allowed to get the wound wet, please use gentle soap to wash the wound area. DO NOT aggressively scrub the wound with a washcloth or bath sponge. Please visually inspect your wound(s) at least once daily. If the wound(s) are in a difficult to see location, please use a mirror or have someone else assist with visual inspection.   6) No scrubbing the wound. You may \"pad dry\" the wound, but do not rub, as this may open up the wound and pre-dispose to wound infection.   7) Do not apply lotions or creams to incision site, unless instructed otherwise.   8) Observe for redness, swelling, or " drainage. Please call the clinic immediately if you have fevers, chills with warmth/redness surrounding wound site or if you notice pus drainage from the wound site     Activity   No heavy lifting objects greater than 10 pounds.   No driving while on narcotic pain medication.   No submerging wound under standing water (pool, bath tub, etc.) until otherwise instructed.   You may be protected weightbearing as tolerated on your operative (left lower) extremity   Use a walker for ambulation for at least 2 weeks after surgery.  May wean from walker after 2 weeks if approved by your therapist.   Posterior hip precautions for 6 weeks: No bending the hip past 90 degrees. Do not allow the leg to cross the midline of your body (adduction). No twisting motions. Ask your physical therapist to review these precautions with you.     Blood Clot Prophylaxis   (Aspirin vs. Lovenox vs. Eliquis administration is determined by your surgeon and tailored to your specific risk profile. You will be discharged with one of these medications.) You will need to complete a total 4 week course of enteric coated aspirin 325 mg (or 81mg) twice daily or Eliquis 2.5mg twice daily, in order to minimize your risk of blood clots following surgery. You will be supplied with a prescription to obtain this. Alternatively, you will need to compete a total 2 week course of Lovenox after surgery (followed by a 2 week course of aspirin twice daily), in order to minimize the risk of blood clots following surgery. Lovenox requires a single shot in the abdomen, to be taken once daily. You will be supplied with the prescription to obtain this. Prior to your discharge from the hospital, the nursing staff will instruct you on self-administration of the Lovenox, if you will be returning directly home from the hospital.     Discharge Pain Medications   You will be given a prescription for pain medication. You should start taking this the same day after your surgery.  "Wean off as tolerated. Do not wait to take the pain medication until the pain is severe, as it will be difficult to \"catch up\" once this occurs. The pain medication usually reaches its full effect ~1 hour after ingesting. If you have been sent home on Valium, this medication works well for muscle spasms. If you have been sent home on Colace, this medication should be taken until you are off all narcotic (i.e. Norco, Percocet, Oxycodone, etc) pain medications, in order to prevent constipation. Percocet or Norco have Tylenol in their ingredient lists. You must be careful not to exceed 4,000mg (4 grams) of Tylenol, from all sources, within a single 24-hr period. This means that you may not take more than 12 Percocets or Norcos within a 24-hr period. Do NOT take Regular or Extra Strength Tylenol when taking your Percocet or Norco medications.  If you have been sent home with a combination of oxycodone and Tylenol, please take Tylenol as scheduled.  The oxycodone is to be taken as needed for \"breakthrough\" pain.  Some common side effects of the narcotic pain medications (Percocet, Oxycodone, Norco, etc) include nausea and itching. Benadryl is a great over the counter medication that helps calm your stomach, decreases your anxiety levels, and minimizes the itching. You can easily purchase this at your local pharmacy as an over-the-counter medication. Please abide by the instructions as printed on the bottle. If your nausea persists, make sure to take small amounts of crackers or other lighter foods.     Follow-Up   Follow-up with Dr. Alcala's office in 3 weeks from the surgery date for a post-operative evaluation. Have the following xrays done upon arrival to the follow-up appointment: AP pelvis. Please call Dr. Alcala's office at [phone] for orthopaedic appointments or questions.  "

## 2021-06-24 ENCOUNTER — TELEPHONE (OUTPATIENT)
Dept: ORTHOPEDIC SURGERY | Facility: CLINIC | Age: 56
End: 2021-06-24

## 2021-06-24 NOTE — TELEPHONE ENCOUNTER
PATIENT CALLED NEEDING A PT ORDER FAXED TO [facility] FAX #: [phone], PLACE SAID THEY CAN GET HER IN AT 1 TODAY AND SHE WOULD LIKE A CALL IF CAN'T GET ORDER BEFORE 12:15.

## 2021-07-13 ENCOUNTER — OFFICE VISIT (OUTPATIENT)
Dept: ORTHOPEDIC SURGERY | Facility: CLINIC | Age: 56
End: 2021-07-13

## 2021-07-13 VITALS — TEMPERATURE: 96.9 F

## 2021-07-13 DIAGNOSIS — Z96.642 STATUS POST TOTAL HIP REPLACEMENT, LEFT: Primary | ICD-10-CM

## 2021-07-13 PROCEDURE — 99024 POSTOP FOLLOW-UP VISIT: CPT | Performed by: PHYSICIAN ASSISTANT

## 2021-07-13 NOTE — PROGRESS NOTES
[facility] [place] Orthopaedic Surgery Clinic Note        Subjective     Post-op (2.5 weeks s/p Total Hip Arthroplasty Left 6/23/21)       MARYANN Dumont is a 56 y.o. female.  Patient returns for initial postop visit following left knee EMILIE performed on the above-stated date by Dr. Alcala.  Patient is not requiring the use of any device currently.  She has been using a walker previously.  She does note some mild numbness around the incision.  She is taking her aspirin as directed.  She is in outpatient rehab at this time.  At this point she is happy with the progress she is making.    Currently endorses pain scale maximum 1/10.    No reported fever, chills, night sweats or other constitutional symptoms.        Objective      Physical Exam  Temp 96.9 °F (36.1 °C)     There is no height or weight on file to calculate BMI.      Ortho Exam  Integument:   Left hip: Incision is healing well without redness, warmth, drainage or evidence of infection.  No evidence of fluctuance.    Lower Extremity:   Left hip:    Tenderness:  Mild incisional discomfort.    Swelling:  None    Crepitus:  None    Range of motion:  External Rotation: 30°       Internal Rotation: 30°       Flexion:  90°       Extension:  0°      Deformities:  None    Functional testing: Negative Stinchfield     No leg length discrepancy      Imaging Reviewed:  Ordered left hip plain films.  Imaging read/interpreted by Dr. Alcala.    Indication: Status post left total hip arthroplasty     Comparison: Todays xrays were compared to previous xrays from 2021-06-23     IMPRESSION:      AP pelvis, left hip: Demonstrate a well positioned total hip without evidence of wear, loosening, fracture or osteolysis, femoral head is concentrically reduced within the acetabulum and No significant changes compared to prior radiographs.      Assessment:  1. Status post total hip replacement, left        Plan:  1. *Status post left EMILIE, stable and doing well.  2. Patient to  continue with outpatient PT.  3. Continue taking aspirin as directed.  4. Follow-up in 3 weeks for repeat evaluation which will include AP pelvis.  5. Questions and concerns answered.      Flores Arthur PA-C  07/19/21  10:44 EDT      Dragon disclaimer:  Much of this encounter note is an electronic transcription/translation of spoken language to printed text. The electronic translation of spoken language may permit erroneous, or at times, nonsensical words or phrases to be inadvertently transcribed; Although I have reviewed the note for such errors, some may still exist.

## 2021-08-03 ENCOUNTER — OFFICE VISIT (OUTPATIENT)
Dept: ORTHOPEDIC SURGERY | Facility: CLINIC | Age: 56
End: 2021-08-03

## 2021-08-03 DIAGNOSIS — Z96.642 STATUS POST TOTAL HIP REPLACEMENT, LEFT: Primary | ICD-10-CM

## 2021-08-03 PROCEDURE — 99024 POSTOP FOLLOW-UP VISIT: CPT | Performed by: ORTHOPAEDIC SURGERY

## 2021-08-03 NOTE — PROGRESS NOTES
Orthopaedic Clinic Note:  Hip Post Op    Chief Complaint   Patient presents with   • Post-op     3 week recheck- 6 weeks s/p (L) EMILIE 06/23/2021        HPI    Ms. Dumont is 6  week(s) s/p left total hip arthroplasty.  She is approximately 6 weeks out from surgery.  Rates her pain 0/10 on the pain scale.  She is ambulating with no assistive device.  Denies fevers chills or constitutional symptoms.  Overall she is happy with her outcome.    Past Medical History:   Diagnosis Date   • Anxiety    • Anxiety and depression    • Arthritis    • Asthma    • Depression    • Diabetes mellitus (CMS/HCC)    • Disease of thyroid gland    • GERD (gastroesophageal reflux disease)    • Hyperlipidemia    • Hypertension    • Migraines    • Sleep apnea     c-pap setting reported as 13       Past Surgical History:   Procedure Laterality Date   • ABDOMINAL SURGERY      laparoscopic expolratory   • BLEPHAROPLASTY Bilateral    • CATARACT EXTRACTION, BILATERAL     • CERVIX LESION DESTRUCTION     • COLONOSCOPY  2017   • COSMETIC SURGERY Bilateral     blepharoplasty    • EYE SURGERY Bilateral     cataract   • FINGER/THUMB ARTHROPLASTY Bilateral    • GASTRIC BYPASS     • JOINT REPLACEMENT Bilateral     thumb    • KNEE ARTHROSCOPY Right    • TOTAL HIP ARTHROPLASTY Left 6/23/2021    Procedure: TOTAL HIP ARTHROPLASTY LEFT;  Surgeon: Jeremiah Alcala MD;  Location: [facility];  Service: Orthopedics;  Laterality: Left;      Family History   Problem Relation Age of Onset   • Heart attack Mother    • Hypertension Mother    • Cancer Mother    • Diabetes Father    • Heart attack Father    • Hypertension Father    • Diabetes Paternal Grandmother      Social History     Socioeconomic History   • Marital status:      Spouse name: Not on file   • Number of children: Not on file   • Years of education: Not on file   • Highest education level: Not on file   Tobacco Use   • Smoking status: Never Smoker   • Smokeless tobacco: Never Used   Vaping Use   •  Vaping Use: Never used   Substance and Sexual Activity   • Alcohol use: Yes     Comment: rarely   • Drug use: Never   • Sexual activity: Defer      Current Outpatient Medications on File Prior to Visit   Medication Sig Dispense Refill   • acetaminophen (TYLENOL) 500 MG tablet Take 2 tablets by mouth Every 8 (Eight) Hours x1 week, then take as needed. 42 tablet 0   • albuterol (ProAir RespiClick) 108 (90 Base) MCG/ACT inhaler Inhale 2 puffs Every 6 (Six) Hours As Needed.     • amLODIPine (NORVASC) 2.5 MG tablet Take 2.5 mg by mouth Daily.     • ARIPiprazole (ABILIFY) 2 MG tablet Take 2 mg by mouth Daily.     • aspirin 81 MG EC tablet Take 1 tablet by mouth Every 12 (Twelve) Hours. 60 tablet 0   • atorvastatin (LIPITOR) 40 MG tablet Take 40 mg by mouth Daily.     • B-D UF III MINI PEN NEEDLES 31G X 5 MM misc USE 1 ONCE DAILY WITH VICTOZA INJECTION     • butalbital-aspirin-caffeine (FIORINAL) -40 MG capsule Take 1 capsule by mouth Every 8 (Eight) Hours As Needed.     • cetirizine (zyrTEC) 10 MG tablet Take 10 mg by mouth Daily.     • docusate sodium (Colace) 100 MG capsule Take 1 capsule by mouth 2 (Two) Times a Day. 60 capsule 0   • DULoxetine (CYMBALTA) 60 MG capsule Take 60 mg by mouth Daily.     • fluticasone-salmeterol (ADVAIR) 500-50 MCG/DOSE DISKUS Inhale 1 puff 2 (Two) Times a Day.     • guaiFENesin (MUCINEX) 600 MG 12 hr tablet Take 1,200 mg by mouth 2 (Two) Times a Day As Needed for Cough or Congestion.     • levothyroxine (SYNTHROID, LEVOTHROID) 50 MCG tablet Take 50 mcg by mouth Daily.     • losartan-hydrochlorothiazide (HYZAAR) 100-25 MG per tablet Take 1 tablet by mouth Daily.     • melatonin 5 MG tablet tablet Take 10 mg by mouth Every Night.     • meloxicam (MOBIC) 15 MG tablet Take 1 tablet by mouth Daily. 10 tablet 0   • metFORMIN (GLUCOPHAGE) 1000 MG tablet Take 1,000 mg by mouth 2 (Two) Times a Day With Meals.     • montelukast (SINGULAIR) 10 MG tablet Take 10 mg by mouth Daily.     •  omeprazole (priLOSEC) 20 MG capsule Take 20 mg by mouth Daily.     • potassium chloride (MICRO-K) 10 MEQ CR capsule Take 10 mEq by mouth Daily.     • Victoza 18 MG/3ML solution pen-injector injection Inject 0.6 mg under the skin into the appropriate area as directed Daily.     • Viibryd 40 MG tablet tablet Take 40 mg by mouth Daily.     • zolpidem (AMBIEN) 10 MG tablet Take 10 mg by mouth Every Night.       No current facility-administered medications on file prior to visit.      Allergies   Allergen Reactions   • Erythromycin GI Intolerance   • Sudanyl [Pseudoephedrine] Other (See Comments)     Facial swelling    • Prednisone Other (See Comments)     Hypertension          Review of Systems   Constitutional: Negative.    HENT: Negative.    Eyes: Negative.    Respiratory: Negative.    Cardiovascular: Negative.    Gastrointestinal: Negative.    Endocrine: Negative.    Genitourinary: Negative.    Musculoskeletal: Positive for arthralgias.   Skin: Negative.    Allergic/Immunologic: Negative.    Neurological: Negative.    Hematological: Negative.    Psychiatric/Behavioral: Negative.         Physical Exam  There were no vitals taken for this visit.    There is no height or weight on file to calculate BMI.    GENERAL APPEARANCE: awake, alert, oriented, in no acute distress and well developed, well nourished  LUNGS:  breathing nonlabored  EXTREMITIES: no clubbing, cyanosis  PERIPHERAL PULSES: palpable dorsalis pedis and posterior tibial pulses bilaterally.    GAIT:  Normal            Hip Exam:  Left    RANGE OF MOTION:  EXTENSION/FLEXION:  normal (0-110 degrees)  IR:  20  ER:  35  PAIN WITH HIP MOTION:  no  PAIN WITH LOGROLL:  no     STRENGTH:  ABDUCTOR:  5/5  ADDUCTOR:  5/5  HIP FLEXION:  5/5    GREATER TROCHANTER BURSAL PAIN:  yes    SENSATION TO LIGHT TOUCH:  DEEP PERONEAL/SUPERFICIAL PERONEAL/SURAL/SAPHENOUS/TIBIAL:   intact    EDEMA:  no  ERYTHEMA:  no  WOUNDS/INCISIONS:   yes, well healed surgical incision without  evidence of erythema or drainage  _______________________________________________________________  _______________________________________________________________    RADIOGRAPHIC FINDINGS:   Indication: Status post left total hip arthroplasty    Comparison: Todays xrays were compared to previous xrays from 7/13/2021    AP pelvis: Right: mild joint space narrowing, minimal osteophyte formation;Left: Demonstrate a well positioned total hip without evidence of wear, loosening, fracture or osteolysis, femoral head is concentrically reduced within the acetabulum and No significant changes compared to prior radiographs.    Assessment/Plan:   Diagnosis Plan   1. Status post total hip replacement, left  XR Pelvis 1 or 2 View     Patient is doing well 6-week status post left total hip arthroplasty.  I recommend resumption of activity as tolerated without restrictions.  I will see her back in 2 months for repeat assessment x-ray AP pelvis on return.  She is welcome to follow-up sooner should problems arise.    Jeremiah Alcala MD  08/03/21  14:07 EDT

## 2021-08-13 ENCOUNTER — TELEPHONE (OUTPATIENT)
Dept: ORTHOPEDIC SURGERY | Facility: CLINIC | Age: 56
End: 2021-08-13

## 2021-08-13 NOTE — TELEPHONE ENCOUNTER
I called patient and told her what Dr. Alcala said. She will zoya with any further questions or concerns she may have.  Kathia

## 2021-10-12 ENCOUNTER — OFFICE VISIT (OUTPATIENT)
Dept: ORTHOPEDIC SURGERY | Facility: CLINIC | Age: 56
End: 2021-10-12

## 2021-10-12 VITALS
WEIGHT: 190.4 LBS | BODY MASS INDEX: 35.95 KG/M2 | HEART RATE: 102 BPM | SYSTOLIC BLOOD PRESSURE: 143 MMHG | HEIGHT: 61 IN | DIASTOLIC BLOOD PRESSURE: 96 MMHG

## 2021-10-12 DIAGNOSIS — Z96.642 STATUS POST TOTAL HIP REPLACEMENT, LEFT: Primary | ICD-10-CM

## 2021-10-12 PROCEDURE — 99212 OFFICE O/P EST SF 10 MIN: CPT | Performed by: ORTHOPAEDIC SURGERY

## 2021-10-12 RX ORDER — OXYCODONE HYDROCHLORIDE 5 MG/1
TABLET ORAL
COMMUNITY
Start: 2021-06-23 | End: 2021-10-12

## 2021-10-12 RX ORDER — ESZOPICLONE 3 MG/1
TABLET, FILM COATED ORAL
COMMUNITY
Start: 2021-08-18 | End: 2021-10-12

## 2021-10-12 RX ORDER — CYCLOSPORINE 0.5 MG/ML
1 EMULSION OPHTHALMIC EVERY 12 HOURS
COMMUNITY
Start: 2021-09-10

## 2021-10-12 RX ORDER — OLOPATADINE HYDROCHLORIDE 1 MG/ML
1 SOLUTION/ DROPS OPHTHALMIC EVERY 12 HOURS
COMMUNITY
Start: 2021-09-10

## 2021-10-12 RX ORDER — TRAMADOL HYDROCHLORIDE 50 MG/1
TABLET ORAL SEE ADMIN INSTRUCTIONS
COMMUNITY
Start: 2021-06-08 | End: 2021-10-12

## 2021-10-12 NOTE — PROGRESS NOTES
Orthopaedic Clinic Note: Hip Established Patient    Chief Complaint   Patient presents with   • Follow-up     2 month follow up; 3.5 months s/p (L) EMILIE 06/23/2021        HPI    It has been 2  month(s) since Ms. Dumont's last visit. She returns to clinic today for follow-up left total hip arthroplasty.  She is concerned from surgery.  Rates her pain 0/10 on the pain scale today.  She is ambulatory with no assistive device.  Denies fevers or constitutional symptoms.  Overall she is extremely happy with her outcome.    Past Medical History:   Diagnosis Date   • Anxiety    • Anxiety and depression    • Arthritis    • Asthma    • Depression    • Diabetes mellitus (HCC)    • Disease of thyroid gland    • GERD (gastroesophageal reflux disease)    • Hyperlipidemia    • Hypertension    • Migraines    • Sleep apnea     c-pap setting reported as 13       Past Surgical History:   Procedure Laterality Date   • ABDOMINAL SURGERY      laparoscopic expolratory   • BLEPHAROPLASTY Bilateral    • CATARACT EXTRACTION, BILATERAL     • CERVIX LESION DESTRUCTION     • COLONOSCOPY  2017   • COSMETIC SURGERY Bilateral     blepharoplasty    • EYE SURGERY Bilateral     cataract   • FINGER/THUMB ARTHROPLASTY Bilateral    • GASTRIC BYPASS     • JOINT REPLACEMENT Bilateral     thumb    • KNEE ARTHROSCOPY Right    • TOTAL HIP ARTHROPLASTY Left 6/23/2021    Procedure: TOTAL HIP ARTHROPLASTY LEFT;  Surgeon: Jeremiah Alcala MD;  [facility]  Service: Orthopedics;  Laterality: Left;      Family History   Problem Relation Age of Onset   • Heart attack Mother    • Hypertension Mother    • Cancer Mother    • Diabetes Father    • Heart attack Father    • Hypertension Father    • Diabetes Paternal Grandmother      Social History     Socioeconomic History   • Marital status:    Tobacco Use   • Smoking status: Never Smoker   • Smokeless tobacco: Never Used   Vaping Use   • Vaping Use: Never used   Substance and Sexual Activity   • Alcohol  use: Yes     Comment: rarely   • Drug use: Never   • Sexual activity: Defer      Current Outpatient Medications on File Prior to Visit   Medication Sig Dispense Refill   • acetaminophen (TYLENOL) 500 MG tablet Take 2 tablets by mouth Every 8 (Eight) Hours x1 week, then take as needed. 42 tablet 0   • albuterol (ProAir RespiClick) 108 (90 Base) MCG/ACT inhaler Inhale 2 puffs Every 6 (Six) Hours As Needed.     • amLODIPine (NORVASC) 2.5 MG tablet Take 2.5 mg by mouth Daily.     • ARIPiprazole (ABILIFY) 2 MG tablet Take 2 mg by mouth Daily.     • atorvastatin (LIPITOR) 40 MG tablet Take 40 mg by mouth Daily.     • B-D UF III MINI PEN NEEDLES 31G X 5 MM misc USE 1 ONCE DAILY WITH VICTOZA INJECTION     • butalbital-aspirin-caffeine (FIORINAL) -40 MG capsule Take 1 capsule by mouth Every 8 (Eight) Hours As Needed.     • cetirizine (zyrTEC) 10 MG tablet Take 10 mg by mouth Daily.     • cycloSPORINE (Restasis) 0.05 % ophthalmic emulsion Apply 1 drop to eye(s) as directed by provider Every 12 (Twelve) Hours.     • DULoxetine (CYMBALTA) 60 MG capsule Take 60 mg by mouth Daily.     • fluticasone-salmeterol (ADVAIR) 500-50 MCG/DOSE DISKUS Inhale 1 puff 2 (Two) Times a Day.     • guaiFENesin (MUCINEX) 600 MG 12 hr tablet Take 1,200 mg by mouth 2 (Two) Times a Day As Needed for Cough or Congestion.     • levothyroxine (SYNTHROID, LEVOTHROID) 50 MCG tablet Take 50 mcg by mouth Daily.     • losartan-hydrochlorothiazide (HYZAAR) 100-25 MG per tablet Take 1 tablet by mouth Daily.     • melatonin 5 MG tablet tablet Take 10 mg by mouth Every Night.     • metFORMIN (GLUCOPHAGE) 1000 MG tablet Take 1,000 mg by mouth 2 (Two) Times a Day With Meals.     • montelukast (SINGULAIR) 10 MG tablet Take 10 mg by mouth Daily.     • olopatadine (PATANOL) 0.1 % ophthalmic solution Apply 1 drop to eye(s) as directed by provider Every 12 (Twelve) Hours.     • omeprazole (priLOSEC) 20 MG capsule Take 20 mg by mouth Daily.     • potassium chloride  "(MICRO-K) 10 MEQ CR capsule Take 10 mEq by mouth Daily.     • Victoza 18 MG/3ML solution pen-injector injection Inject 0.6 mg under the skin into the appropriate area as directed Daily.     • Viibryd 40 MG tablet tablet Take 40 mg by mouth Daily.     • zolpidem (AMBIEN) 10 MG tablet Take 10 mg by mouth Every Night.     • [DISCONTINUED] aspirin 81 MG EC tablet Take 1 tablet by mouth Every 12 (Twelve) Hours. 60 tablet 0   • [DISCONTINUED] docusate sodium (Colace) 100 MG capsule Take 1 capsule by mouth 2 (Two) Times a Day. 60 capsule 0   • [DISCONTINUED] eszopiclone (LUNESTA) 3 MG tablet      • [DISCONTINUED] meloxicam (MOBIC) 15 MG tablet Take 1 tablet by mouth Daily. 10 tablet 0   • [DISCONTINUED] metFORMIN (GLUCOPHAGE) 500 MG tablet      • [DISCONTINUED] oxyCODONE (ROXICODONE) 5 MG immediate release tablet      • [DISCONTINUED] traMADol (ULTRAM) 50 MG tablet Take  by mouth See Admin Instructions.       No current facility-administered medications on file prior to visit.      Allergies   Allergen Reactions   • Erythromycin GI Intolerance   • Sudanyl [Pseudoephedrine] Other (See Comments)     Facial swelling    • Prednisone Other (See Comments)     Hypertension          Review of Systems   Constitutional: Negative.    HENT: Negative.    Eyes: Negative.    Respiratory: Negative.    Cardiovascular: Negative.    Gastrointestinal: Negative.    Endocrine: Negative.    Genitourinary: Negative.    Musculoskeletal: Positive for arthralgias.   Skin: Negative.    Allergic/Immunologic: Negative.    Neurological: Negative.    Hematological: Negative.    Psychiatric/Behavioral: Negative.         The patient's Review of Systems was personally reviewed and confirmed as accurate.    Physical Exam  Blood pressure 143/96, pulse 102, height 154.9 cm (60.98\"), weight 86.4 kg (190 lb 6.4 oz).    Body mass index is 35.99 kg/m².    GENERAL APPEARANCE: awake, alert, oriented, in no acute distress and well developed, well nourished  LUNGS:  " breathing nonlabored  EXTREMITIES: no clubbing, cyanosis  PERIPHERAL PULSES: palpable dorsalis pedis and posterior tibial pulses bilaterally.    GAIT:  Normal            Hip Exam:  Left    RANGE OF MOTION:  EXTENSION/FLEXION:  normal (0-110 degrees)  IR (at 90 degrees of flexion):  20  ER (at 90 degrees of flexion):  35  PAIN WITH HIP MOTION:  no  PAIN WITH LOGROLL:  no     STINCHFIELD TEST: negative    STRENGTH:  ABDUCTOR:  5/5  ADDUCTOR:  5/5  HIP FLEXION:  5/5    GREATER TROCHANTER BURSAL PAIN:  no    SENSATION TO LIGHT TOUCH:  DEEP PERONEAL/SUPERFICIAL PERONEAL/SURAL/SAPHENOUS/TIBIAL:   intact    EDEMA:  no  ERYTHEMA:  no  WOUNDS/INCISIONS:   yes, well healed surgical incision without evidence of erythema or drainage  _________________________________________________________________  _________________________________________________________________    RADIOGRAPHIC FINDINGS:   Indication: Status post left total arthroplasty    Comparison: Todays xrays were compared to previous xrays from 8/3/2021    AP pelvis: Right: mild joint space narrowing, minimal osteophyte formation and No significant changes compared to prior radiographs.;Left: Demonstrate a well positioned total hip without evidence of wear, loosening, fracture or osteolysis, femoral head is concentrically reduced within the acetabulum and No significant changes compared to prior radiographs.      Assessment/Plan:   Diagnosis Plan   1. Status post total hip replacement, left  XR Pelvis 1 or 2 View     Patient is doing well 3 and half month status post left total hip arthroplasty.  I recommend continued activity as tolerated without restrictions.  I will see her back in 9 months for repeat assessment x-ray AP pelvis on return.  She is welcome follow-up sooner should problems arise.    Jeremiah Alcala MD  10/12/21  10:35 EDT

## 2022-07-12 ENCOUNTER — OFFICE VISIT (OUTPATIENT)
Dept: ORTHOPEDIC SURGERY | Facility: CLINIC | Age: 57
End: 2022-07-12

## 2022-07-12 VITALS
DIASTOLIC BLOOD PRESSURE: 80 MMHG | SYSTOLIC BLOOD PRESSURE: 116 MMHG | HEIGHT: 61 IN | BODY MASS INDEX: 35.87 KG/M2 | WEIGHT: 190 LBS

## 2022-07-12 DIAGNOSIS — Z96.642 STATUS POST TOTAL HIP REPLACEMENT, LEFT: Primary | ICD-10-CM

## 2022-07-12 PROCEDURE — 99212 OFFICE O/P EST SF 10 MIN: CPT | Performed by: ORTHOPAEDIC SURGERY

## 2022-07-12 ASSESSMENT — HOOS JR
HOOS JR SCORE: 100
HOOS JR SCORE: 0

## 2022-07-12 NOTE — PROGRESS NOTES
Orthopaedic Clinic Note: Hip Established Patient    Chief Complaint   Patient presents with   • Follow-up     9 month f/u - 1 year Status post total hip replacement, left 6/23/2021        HPI    It has been 9  month(s) since Ms. Dumont's last visit. She returns to clinic today for follow-up left total hip arthroplasty.  She is year out from surgery.  Rates her pain 0/10 on pain scale today.  She is ambulating with no assistive device.  Denies fevers chills or constitutional symptoms.  Overall she is happy with her outcome.    Past Medical History:   Diagnosis Date   • Anxiety    • Anxiety and depression    • Arthritis    • Asthma    • Depression    • Diabetes mellitus (HCC)    • Disease of thyroid gland    • GERD (gastroesophageal reflux disease)    • Hyperlipidemia    • Hypertension    • Migraines    • Sleep apnea     c-pap setting reported as 13       Past Surgical History:   Procedure Laterality Date   • ABDOMINAL SURGERY      laparoscopic expolratory   • BLEPHAROPLASTY Bilateral    • CATARACT EXTRACTION, BILATERAL     • CERVIX LESION DESTRUCTION     • COLONOSCOPY  2017   • COSMETIC SURGERY Bilateral     blepharoplasty    • EYE SURGERY Bilateral     cataract   • FINGER/THUMB ARTHROPLASTY Bilateral    • GASTRIC BYPASS     • JOINT REPLACEMENT Bilateral     thumb    • KNEE ARTHROSCOPY Right    • TOTAL HIP ARTHROPLASTY Left 6/23/2021    Procedure: TOTAL HIP ARTHROPLASTY LEFT;  Surgeon: Jeremiah Alcala MD;  Location: [facility];  Service: Orthopedics;  Laterality: Left;      Family History   Problem Relation Age of Onset   • Heart attack Mother    • Hypertension Mother    • Cancer Mother    • Diabetes Father    • Heart attack Father    • Hypertension Father    • Diabetes Paternal Grandmother      Social History     Socioeconomic History   • Marital status: Legally    Tobacco Use   • Smoking status: Never Smoker   • Smokeless tobacco: Never Used   Vaping Use   • Vaping Use: Never used   Substance and Sexual  Activity   • Alcohol use: Yes     Comment: rarely   • Drug use: Never   • Sexual activity: Defer      Current Outpatient Medications on File Prior to Visit   Medication Sig Dispense Refill   • acetaminophen (TYLENOL) 500 MG tablet Take 2 tablets by mouth Every 8 (Eight) Hours x1 week, then take as needed. 42 tablet 0   • albuterol (ProAir RespiClick) 108 (90 Base) MCG/ACT inhaler Inhale 2 puffs Every 6 (Six) Hours As Needed.     • amLODIPine (NORVASC) 2.5 MG tablet Take 2.5 mg by mouth Daily.     • ARIPiprazole (ABILIFY) 2 MG tablet Take 2 mg by mouth Daily.     • atorvastatin (LIPITOR) 40 MG tablet Take 40 mg by mouth Daily.     • B-D UF III MINI PEN NEEDLES 31G X 5 MM misc USE 1 ONCE DAILY WITH VICTOZA INJECTION     • butalbital-aspirin-caffeine (FIORINAL) -40 MG capsule Take 1 capsule by mouth Every 8 (Eight) Hours As Needed.     • cetirizine (zyrTEC) 10 MG tablet Take 10 mg by mouth Daily.     • cycloSPORINE (Restasis) 0.05 % ophthalmic emulsion Apply 1 drop to eye(s) as directed by provider Every 12 (Twelve) Hours.     • DULoxetine (CYMBALTA) 60 MG capsule Take 60 mg by mouth Daily.     • fluticasone-salmeterol (ADVAIR) 500-50 MCG/DOSE DISKUS Inhale 1 puff 2 (Two) Times a Day.     • guaiFENesin (MUCINEX) 600 MG 12 hr tablet Take 1,200 mg by mouth 2 (Two) Times a Day As Needed for Cough or Congestion.     • levothyroxine (SYNTHROID, LEVOTHROID) 50 MCG tablet Take 50 mcg by mouth Daily.     • losartan-hydrochlorothiazide (HYZAAR) 100-25 MG per tablet Take 1 tablet by mouth Daily.     • melatonin 5 MG tablet tablet Take 10 mg by mouth Every Night.     • metFORMIN (GLUCOPHAGE) 1000 MG tablet Take 1,000 mg by mouth 2 (Two) Times a Day With Meals.     • montelukast (SINGULAIR) 10 MG tablet Take 10 mg by mouth Daily.     • olopatadine (PATANOL) 0.1 % ophthalmic solution Apply 1 drop to eye(s) as directed by provider Every 12 (Twelve) Hours.     • omeprazole (priLOSEC) 20 MG capsule Take 20 mg by mouth Daily.    "  • potassium chloride (MICRO-K) 10 MEQ CR capsule Take 10 mEq by mouth Daily.     • Victoza 18 MG/3ML solution pen-injector injection Inject 0.6 mg under the skin into the appropriate area as directed Daily.     • Viibryd 40 MG tablet tablet Take 40 mg by mouth Daily.     • [DISCONTINUED] zolpidem (AMBIEN) 10 MG tablet Take 10 mg by mouth Every Night.       No current facility-administered medications on file prior to visit.      Allergies   Allergen Reactions   • Erythromycin GI Intolerance   • Sudanyl [Pseudoephedrine] Other (See Comments)     Facial swelling    • Prednisone Other (See Comments)     Hypertension          Review of Systems   Constitutional: Positive for fatigue.   HENT: Positive for postnasal drip and sneezing.    Eyes: Positive for itching.   Respiratory: Positive for apnea.    Cardiovascular: Negative.    Gastrointestinal: Negative.    Endocrine: Positive for heat intolerance.   Genitourinary: Negative.    Musculoskeletal: Positive for arthralgias and neck pain.   Skin: Negative.    Allergic/Immunologic: Positive for environmental allergies.   Neurological: Negative.    Hematological: Bruises/bleeds easily.   Psychiatric/Behavioral: Negative.         The patient's Review of Systems was personally reviewed and confirmed as accurate.    Physical Exam  Blood pressure 116/80, height 154.9 cm (61\"), weight 86.2 kg (190 lb).    Body mass index is 35.9 kg/m².    GENERAL APPEARANCE: awake, alert, oriented, in no acute distress and well developed, well nourished  LUNGS:  breathing nonlabored  EXTREMITIES: no clubbing, cyanosis  PERIPHERAL PULSES: palpable dorsalis pedis and posterior tibial pulses bilaterally.    GAIT:  Normal            Hip Exam:  Left     RANGE OF MOTION:  EXTENSION/FLEXION:  normal (0-110 degrees)  IR (at 90 degrees of flexion):  20  ER (at 90 degrees of flexion):  35  PAIN WITH HIP MOTION:  no  PAIN WITH LOGROLL:  no      STINCHFIELD TEST: negative     STRENGTH:  ABDUCTOR:    " 5/5  ADDUCTOR:  5/5  HIP FLEXION:  5/5     GREATER TROCHANTER BURSAL PAIN:  no    SENSATION TO LIGHT TOUCH:  DEEP PERONEAL/SUPERFICIAL PERONEAL/SURAL/SAPHENOUS/TIBIAL:   intact    EDEMA:  no  ERYTHEMA:  no  WOUNDS/INCISIONS:   yes, well healed surgical incision without evidence of erythema or drainage  _________________________________________________________________  _________________________________________________________________    RADIOGRAPHIC FINDINGS:   Indication: Status post left total hip arthroplasty    Comparison: Todays xrays were compared to previous xrays from 10/12/2021    AP pelvis: Right: mild joint space narrowing, minimal osteophyte formation and No significant changes compared to prior radiographs.;Left: Demonstrate a well positioned total hip without evidence of wear, loosening, fracture or osteolysis, femoral head is concentrically reduced within the acetabulum and No significant changes compared to prior radiographs.      Assessment/Plan:   Diagnosis Plan   1. Status post total hip replacement, left  XR Pelvis 1 or 2 View     Patient is doing well 1 year status post left total hip arthroplasty.  I recommend continued activity as tolerated without restrictions.  I will see the patient back in 2 years for repeat assessment x-ray AP pelvis on return.  She is welcome follow-up sooner should problems arise.    Jeremiah Alcala MD  07/12/22  11:34 EDT

## 2022-11-02 ENCOUNTER — OFFICE VISIT (OUTPATIENT)
Dept: INTERNAL MEDICINE | Facility: CLINIC | Age: 57
End: 2022-11-02

## 2022-11-02 VITALS
HEIGHT: 61 IN | DIASTOLIC BLOOD PRESSURE: 76 MMHG | TEMPERATURE: 97.2 F | BODY MASS INDEX: 36.06 KG/M2 | HEART RATE: 100 BPM | OXYGEN SATURATION: 97 % | WEIGHT: 191 LBS | SYSTOLIC BLOOD PRESSURE: 118 MMHG

## 2022-11-02 DIAGNOSIS — E78.5 HYPERLIPIDEMIA, UNSPECIFIED HYPERLIPIDEMIA TYPE: ICD-10-CM

## 2022-11-02 DIAGNOSIS — E11.59 HYPERTENSION ASSOCIATED WITH DIABETES: ICD-10-CM

## 2022-11-02 DIAGNOSIS — F51.04 PSYCHOPHYSIOLOGIC INSOMNIA: ICD-10-CM

## 2022-11-02 DIAGNOSIS — Z23 NEED FOR INFLUENZA VACCINATION: ICD-10-CM

## 2022-11-02 DIAGNOSIS — G47.33 OSA ON CPAP: ICD-10-CM

## 2022-11-02 DIAGNOSIS — Z76.89 ENCOUNTER TO ESTABLISH CARE: Primary | ICD-10-CM

## 2022-11-02 DIAGNOSIS — Z99.89 OSA ON CPAP: ICD-10-CM

## 2022-11-02 DIAGNOSIS — I15.2 HYPERTENSION ASSOCIATED WITH DIABETES: ICD-10-CM

## 2022-11-02 DIAGNOSIS — E11.9 TYPE 2 DIABETES MELLITUS WITHOUT COMPLICATION, WITHOUT LONG-TERM CURRENT USE OF INSULIN: ICD-10-CM

## 2022-11-02 DIAGNOSIS — G62.9 NEUROPATHY: ICD-10-CM

## 2022-11-02 PROCEDURE — 90471 IMMUNIZATION ADMIN: CPT | Performed by: NURSE PRACTITIONER

## 2022-11-02 PROCEDURE — 99203 OFFICE O/P NEW LOW 30 MIN: CPT | Performed by: NURSE PRACTITIONER

## 2022-11-02 PROCEDURE — 90686 IIV4 VACC NO PRSV 0.5 ML IM: CPT | Performed by: NURSE PRACTITIONER

## 2022-11-02 RX ORDER — BUSPIRONE HYDROCHLORIDE 5 MG/1
5 TABLET ORAL NIGHTLY
Qty: 30 TABLET | Refills: 1 | Status: SHIPPED | OUTPATIENT
Start: 2022-11-02 | End: 2022-12-05 | Stop reason: SDUPTHER

## 2022-11-02 RX ORDER — DULOXETIN HYDROCHLORIDE 60 MG/1
60 CAPSULE, DELAYED RELEASE ORAL DAILY
Qty: 90 CAPSULE | Refills: 3 | Status: SHIPPED | OUTPATIENT
Start: 2022-11-02 | End: 2023-01-21 | Stop reason: SDUPTHER

## 2022-11-02 NOTE — PROGRESS NOTES
Date: 2022    Name: Robina Dumont  : 1965    Chief Complaint:   Chief Complaint   Patient presents with   • [facility]       HPI:  Robina Dumont is a 57 y.o. female presents to establish care.      Moved to [place] from [place] a couple of weeks ago.  Status post gastric bypass, has lost at least 60 pounds.      T2DM A1c 6.7, 2-3 months ago.  Currently taking metformin 2000 mg twice daily. Patient denies foot ulcerations, polydipsia, polyuria, polyphagia, visual disturbances and weight loss.     Bilateral thumb joints replaced.  Left hip replacement . Takes duloxetine for neuropathic pain in her left thigh, works well.  Constant arthralgia.      Hypothyroidism.  Takes levothyroxine 50 mcg daily as prescribed.    Migraines since menarche at age 11.     Insomnia.  Has taken Lunesta (does not work), Ambien.  She is trying not to take Ambien.  Since she has moved to [place], unable to shut her brain off and can't fall asleep.   JIMMY, uses CPAP. Setting 13.     HTN, hyperlipidemia: Takes amlodipine 2.5 mg, lisinopril-HCTZ 100-25 mg, atorvastatin 40 mg daily as prescribed.       History:  LMP: No LMP recorded. Patient is postmenopausal.  Menopause in her 40's.  She is hot natured. Wears shorts year round due to feeling hot all the time, sweating since menopause.    Sexual activity: not currently.  Heterosexual.    Last pap date: 2-3 years.    Abnormal pap? yes, dysplasia at age 22, cryosurgery at that time without further abnormal tests.    : 0  Para: 0    Do you take any herbs or supplements that were not prescribed by a doctor? yes, bariatric supplement    Health Habits:  Dental Exam. up to date  Eye Exam. up to date, has glasses  Exercise: just moved, currently moving into new home times/week.  Current diet: high protein, low carb after bariatric surgery.      History:    Past Medical History:   Diagnosis Date   • Anxiety    • Anxiety and depression    • Arthritis    • Asthma     • Depression    • Diabetes mellitus (HCC)    • Disease of thyroid gland    • GERD (gastroesophageal reflux disease)    • Hyperlipidemia    • Hypertension    • Migraines    • Sleep apnea     c-pap setting reported as 13        Past Surgical History:   Procedure Laterality Date   • ABDOMINAL SURGERY      laparoscopic expolratory   • BARIATRIC SURGERY  11/2019   • BLEPHAROPLASTY Bilateral    • CATARACT EXTRACTION, BILATERAL     • CERVIX LESION DESTRUCTION     • COLONOSCOPY  2017   • COSMETIC SURGERY Bilateral     blepharoplasty    • EYE SURGERY Bilateral     cataract   • FINGER/THUMB ARTHROPLASTY Bilateral    • GASTRIC BYPASS     • JOINT REPLACEMENT Bilateral     thumb    • KNEE ARTHROSCOPY Right    • TOTAL HIP ARTHROPLASTY Left 06/23/2021    Procedure: TOTAL HIP ARTHROPLASTY LEFT;  Surgeon: Jeremiah Alcala MD;  Location: [facility];  Service: Orthopedics;  Laterality: Left;       Family History   Problem Relation Age of Onset   • Heart attack Mother    • Hypertension Mother    • Cancer Mother    • Diabetes Father    • Heart attack Father    • Hypertension Father    • Diabetes Paternal Grandmother        Social History     Socioeconomic History   • Marital status:    Tobacco Use   • Smoking status: Never   • Smokeless tobacco: Never   Vaping Use   • Vaping Use: Never used   Substance and Sexual Activity   • Alcohol use: Yes     Comment: rarely   • Drug use: Never   • Sexual activity: Not Currently     Partners: Male       Allergies   Allergen Reactions   • Erythromycin GI Intolerance   • Sudanyl [Pseudoephedrine] Other (See Comments)     Facial swelling    • Prednisone Other (See Comments)     Hypertension           Current Outpatient Medications:   •  acetaminophen (TYLENOL) 500 MG tablet, Take 2 tablets by mouth Every 8 (Eight) Hours x1 week, then take as needed., Disp: 42 tablet, Rfl: 0  •  albuterol (ProAir RespiClick) 108 (90 Base) MCG/ACT inhaler, Inhale 2 puffs Every 6 (Six) Hours As Needed., Disp:  , Rfl:   •  amLODIPine (NORVASC) 2.5 MG tablet, Take 2.5 mg by mouth Daily., Disp: , Rfl:   •  B-D UF III MINI PEN NEEDLES 31G X 5 MM misc, USE 1 ONCE DAILY WITH VICTOZA INJECTION, Disp: , Rfl:   •  cetirizine (zyrTEC) 10 MG tablet, Take 10 mg by mouth Daily., Disp: , Rfl:   •  cycloSPORINE (Restasis) 0.05 % ophthalmic emulsion, Apply 1 drop to eye(s) as directed by provider Every 12 (Twelve) Hours., Disp: , Rfl:   •  DULoxetine (CYMBALTA) 60 MG capsule, Take 1 capsule by mouth Daily., Disp: 90 capsule, Rfl: 3  •  fluticasone-salmeterol (ADVAIR) 500-50 MCG/DOSE DISKUS, Inhale 1 puff 2 (Two) Times a Day., Disp: , Rfl:   •  guaiFENesin (MUCINEX) 600 MG 12 hr tablet, Take 1,200 mg by mouth 2 (Two) Times a Day As Needed for Cough or Congestion., Disp: , Rfl:   •  levothyroxine (SYNTHROID, LEVOTHROID) 50 MCG tablet, Take 50 mcg by mouth Daily., Disp: , Rfl:   •  losartan-hydrochlorothiazide (HYZAAR) 100-25 MG per tablet, Take 1 tablet by mouth Daily., Disp: , Rfl:   •  melatonin 5 MG tablet tablet, Take 10 mg by mouth Every Night., Disp: , Rfl:   •  metFORMIN (GLUCOPHAGE) 1000 MG tablet, Take 1,000 mg by mouth 2 (Two) Times a Day With Meals., Disp: , Rfl:   •  montelukast (SINGULAIR) 10 MG tablet, Take 10 mg by mouth Daily., Disp: , Rfl:   •  olopatadine (PATANOL) 0.1 % ophthalmic solution, Apply 1 drop to eye(s) as directed by provider Every 12 (Twelve) Hours., Disp: , Rfl:   •  omeprazole (priLOSEC) 20 MG capsule, Take 20 mg by mouth Daily., Disp: , Rfl:   •  Victoza 18 MG/3ML solution pen-injector injection, Inject 0.6 mg under the skin into the appropriate area as directed Daily., Disp: , Rfl:   •  Viibryd 40 MG tablet tablet, Take 40 mg by mouth Daily., Disp: , Rfl:   •  ARIPiprazole (ABILIFY) 2 MG tablet, Take 1 tablet by mouth Daily., Disp: 90 tablet, Rfl: 1  •  atorvastatin (LIPITOR) 40 MG tablet, Take 1 tablet by mouth Daily., Disp: 90 tablet, Rfl: 1  •  busPIRone (BUSPAR) 5 MG tablet, Take 1 tablet by mouth  "Every Night., Disp: 30 tablet, Rfl: 1  •  potassium chloride (MICRO-K) 10 MEQ CR capsule, Take 1 capsule by mouth Daily., Disp: 90 capsule, Rfl: 0  •  ubrogepant 100 MG tablet, Take once with first symptom of migraine.  May taken again 2 hours later if migraine persists., Disp: 9 tablet, Rfl: 1    VS:  Vitals:    11/02/22 1425   BP: 118/76   Pulse: 100   Temp: 97.2 °F (36.2 °C)   SpO2: 97%   Weight: 86.6 kg (191 lb)   Height: 154.9 cm (60.98\")     Body mass index is 36.11 kg/m².    PE:  Physical Exam  Constitutional:       Appearance: She is obese. She is not ill-appearing.   HENT:      Head: Normocephalic.      Right Ear: External ear normal.      Left Ear: External ear normal.   Eyes:      Conjunctiva/sclera: Conjunctivae normal.      Pupils: Pupils are equal, round, and reactive to light.   Cardiovascular:      Rate and Rhythm: Normal rate and regular rhythm.      Pulses:           Radial pulses are 2+ on the right side and 2+ on the left side.        Dorsalis pedis pulses are 2+ on the right side and 2+ on the left side.      Heart sounds: Normal heart sounds.   Pulmonary:      Effort: Pulmonary effort is normal.      Breath sounds: Normal breath sounds.   Musculoskeletal:      Cervical back: Normal range of motion and neck supple.      Right lower leg: No edema.      Left lower leg: No edema.   Skin:     General: Skin is warm.      Capillary Refill: Capillary refill takes less than 2 seconds.   Neurological:      Mental Status: She is alert and oriented to person, place, and time.      Coordination: Coordination normal.      Gait: Gait normal.   Psychiatric:         Mood and Affect: Mood normal.         Behavior: Behavior normal.         Thought Content: Thought content normal.         Assessment/Plan:         Diagnoses and all orders for this visit:    1. Encounter to establish care (Primary)    2. Psychophysiologic insomnia  -     busPIRone (BUSPAR) 5 MG tablet; Take 1 tablet by mouth Every Night.  Dispense: " 30 tablet; Refill: 1        - Encouraged to take part in daily physical exercise.          - Eat healthy, well balanced diet; avoid sugary foods or beverages        - Abstain from alcohol and drugs        - Ensure good night's sleep by creating calm space in bedroom, avoiding screen time 1-2 hours before bed, no caffeine after 5 pm    3. JIMMY on CPAP         - Continue CPAP at current settings.     4. Type 2 diabetes mellitus without complication, without long-term current use of insulin (HCC)        - Follow diabetic diet        - See eye doctor annually or as discussed        - Wear protective foot wear/no bare feet        - Check feet regularly for calluses or ulcers        - Discussed risk of poorly controlled diabetes and long-term complications        - Exercise as tolerated for 30-45 minutes most days of the week. Gradually increase daily exercise if not currently active.        - Take all medications as prescribed    5. Neuropathy  -     DULoxetine (CYMBALTA) 60 MG capsule; Take 1 capsule by mouth Daily.  Dispense: 90 capsule; Refill: 3    6. Hypertension associated with diabetes (HCC)        - Follow heart healthy diet.  Keep sodium intake < 1500 mg per day.  Avoid processed & fast foods.          - Exercise as tolerated, with a goal of 30 minutes of moderate exercise most days.         - Take medications as prescribed.    7. Hyperlipidemia, unspecified hyperlipidemia type        - Follow heart healthy diet.  Keep sodium intake < 1500 mg per day.  Avoid processed & fast foods.          - Exercise as tolerated, with a goal of 30 minutes of moderate exercise most days.         - Take medications as prescribed.    8. Need for influenza vaccination  -     FluLaval/Fluzone >6 mos ([id number])          Return in about 4 weeks (around 11/30/2022) for Annual.

## 2022-11-03 NOTE — TELEPHONE ENCOUNTER
Rx Refill Note  Requested Prescriptions     Pending Prescriptions Disp Refills   • atorvastatin (LIPITOR) 40 MG tablet 90 tablet 1     Sig: Take 1 tablet by mouth Daily.      Last office visit with prescribing clinician: 11/2/2022      Next office visit with prescribing clinician: 12/5/2022            Tod Randle MA  11/03/22, 12:37 EDT       Last sent by a historical provider

## 2022-11-04 ENCOUNTER — PATIENT ROUNDING (BHMG ONLY) (OUTPATIENT)
Dept: INTERNAL MEDICINE | Facility: CLINIC | Age: 57
End: 2022-11-04

## 2022-11-04 RX ORDER — ATORVASTATIN CALCIUM 40 MG/1
40 TABLET, FILM COATED ORAL DAILY
Qty: 90 TABLET | Refills: 1 | Status: SHIPPED | OUTPATIENT
Start: 2022-11-04

## 2022-11-04 NOTE — PROGRESS NOTES
A [employer] message has been sent to patient for PATIENT ROUNDING with [facility].

## 2022-11-11 RX ORDER — POTASSIUM CHLORIDE 750 MG/1
10 CAPSULE, EXTENDED RELEASE ORAL DAILY
Qty: 90 CAPSULE | Refills: 0 | Status: SHIPPED | OUTPATIENT
Start: 2022-11-11 | End: 2023-01-21 | Stop reason: SDUPTHER

## 2022-11-11 RX ORDER — ARIPIPRAZOLE 2 MG/1
2 TABLET ORAL DAILY
Qty: 90 TABLET | Refills: 1 | Status: SHIPPED | OUTPATIENT
Start: 2022-11-11

## 2022-11-21 RX ORDER — LOSARTAN POTASSIUM AND HYDROCHLOROTHIAZIDE 25; 100 MG/1; MG/1
1 TABLET ORAL DAILY
Qty: 30 TABLET | Refills: 0 | Status: SHIPPED | OUTPATIENT
Start: 2022-11-21 | End: 2022-12-20 | Stop reason: SDUPTHER

## 2022-11-21 NOTE — TELEPHONE ENCOUNTER
Caller: Robina Dumont    Relationship: Self    Best call back number: [phone]    Requested Prescriptions:   Requested Prescriptions     Pending Prescriptions Disp Refills   • losartan-hydrochlorothiazide (HYZAAR) 100-25 MG per tablet       Sig: Take 1 tablet by mouth Daily.      fluticasone-salmeterol (ADVAIR) 500-50 MCG/DOSE DISKUS     Pharmacy where request should be sent: [address] [phone] [facility] [phone] [facility]     Additional details provided by patient:     PATIENT ADVISES SHE WILL BE OUT OF THESE MEDICATIONS IN THE NEXT FEW DAYS.     Epic STATES THAT: fluticasone-salmeterol (ADVAIR) 500-50 MCG/DOSE DISKUS   IS NO LONGER AVAILABLE. PLEASE ADVISE.     Does the patient have less than a 3 day supply:  [x] Yes  [] No    Johnson Prater Rep   11/21/22 09:29 EST

## 2022-11-22 RX ORDER — FLUTICASONE PROPIONATE AND SALMETEROL 500; 50 UG/1; UG/1
1 POWDER RESPIRATORY (INHALATION)
Qty: 1 EACH | Refills: 2 | Status: SHIPPED | OUTPATIENT
Start: 2022-11-22 | End: 2023-03-06

## 2022-11-22 NOTE — TELEPHONE ENCOUNTER
PATIENT STATED THE ADVAIR DID NOT GET SENT TO HER PHARMACY AND STATED SHE NEEDS IT AS SOON AS POSSIBLE.    REQUESTS CALL BACK FOR FOLLOW UP: [phone]

## 2022-12-05 ENCOUNTER — OFFICE VISIT (OUTPATIENT)
Dept: INTERNAL MEDICINE | Facility: CLINIC | Age: 57
End: 2022-12-05

## 2022-12-05 VITALS
HEART RATE: 95 BPM | SYSTOLIC BLOOD PRESSURE: 114 MMHG | OXYGEN SATURATION: 98 % | BODY MASS INDEX: 35.3 KG/M2 | DIASTOLIC BLOOD PRESSURE: 76 MMHG | HEIGHT: 61 IN | WEIGHT: 187 LBS | TEMPERATURE: 97.6 F

## 2022-12-05 DIAGNOSIS — E11.59 HYPERTENSION ASSOCIATED WITH DIABETES: ICD-10-CM

## 2022-12-05 DIAGNOSIS — E11.9 TYPE 2 DIABETES MELLITUS WITHOUT COMPLICATION, WITHOUT LONG-TERM CURRENT USE OF INSULIN: Primary | ICD-10-CM

## 2022-12-05 DIAGNOSIS — I15.2 HYPERTENSION ASSOCIATED WITH DIABETES: ICD-10-CM

## 2022-12-05 DIAGNOSIS — E03.9 ACQUIRED HYPOTHYROIDISM: ICD-10-CM

## 2022-12-05 DIAGNOSIS — J30.89 ENVIRONMENTAL AND SEASONAL ALLERGIES: ICD-10-CM

## 2022-12-05 DIAGNOSIS — E78.5 HYPERLIPIDEMIA, UNSPECIFIED HYPERLIPIDEMIA TYPE: ICD-10-CM

## 2022-12-05 DIAGNOSIS — Z13.0 SCREENING FOR DISORDER OF BLOOD AND BLOOD-FORMING ORGANS: ICD-10-CM

## 2022-12-05 DIAGNOSIS — F51.04 PSYCHOPHYSIOLOGIC INSOMNIA: ICD-10-CM

## 2022-12-05 PROCEDURE — 99214 OFFICE O/P EST MOD 30 MIN: CPT | Performed by: NURSE PRACTITIONER

## 2022-12-05 RX ORDER — BUSPIRONE HYDROCHLORIDE 15 MG/1
15 TABLET ORAL NIGHTLY
Qty: 90 TABLET | Refills: 1 | Status: SHIPPED | OUTPATIENT
Start: 2022-12-05 | End: 2023-03-30

## 2022-12-05 NOTE — PROGRESS NOTES
Office Visit      Patient Name: Robina Dumont  : 1965   MRN: [id number]     Chief Complaint:    Chief Complaint   Patient presents with   • Diabetes   • Hypertension       History of Present Illness: Robina Dumont is a 57 y.o. female who is here today for follow up of T2DM, HTN, insomnia.    Unable to sleep due to anxiety, worrying. Has been taking Buspar as prescribed.  Feels it has been effective.  Has taken Ambien, Lunesta in the past. Denies anhedonia, feelings of worthlessness, difficulty concentrating, impaired memory, SI, HI, panic attacks, weight change.    T2DM: A1c 6.6 a year ago.  Currently taking metformin as prescribed.  Patient denies foot ulcerations, polydipsia, polyuria, polyphagia, visual disturbances and weight loss.     HTN, Hyperlipidemia: taking amlodipine, atorvastatin, losartan-hctz as prescribed.  Denies chest pain, dyspnea, orthopnea, palpitations, lower extremity edema, confusion, headaches, weakness, visual disturbances.    Taking Stahist for nasal congestion, sinus pressure.  No reported fever, chills, earache, cough, SOA.      Subjective      I have reviewed and the following portions of the patient's history were updated as appropriate: past family history, past medical history, past social history, past surgical history and problem list.      Current Outpatient Medications:   •  acetaminophen (TYLENOL) 500 MG tablet, Take 2 tablets by mouth Every 8 (Eight) Hours x1 week, then take as needed., Disp: 42 tablet, Rfl: 0  •  albuterol (ProAir RespiClick) 108 (90 Base) MCG/ACT inhaler, Inhale 2 puffs Every 6 (Six) Hours As Needed., Disp: , Rfl:   •  amLODIPine (NORVASC) 2.5 MG tablet, Take 2.5 mg by mouth Daily., Disp: , Rfl:   •  ARIPiprazole (ABILIFY) 2 MG tablet, Take 1 tablet by mouth Daily., Disp: 90 tablet, Rfl: 1  •  atorvastatin (LIPITOR) 40 MG tablet, Take 1 tablet by mouth Daily., Disp: 90 tablet, Rfl: 1  •  B-D UF III MINI PEN NEEDLES 31G X 5 MM misc, USE  1 ONCE DAILY WITH VICTOZA INJECTION, Disp: , Rfl:   •  busPIRone (BUSPAR) 15 MG tablet, Take 1 tablet by mouth Every Night., Disp: 90 tablet, Rfl: 1  •  cetirizine (zyrTEC) 10 MG tablet, Take 10 mg by mouth Daily., Disp: , Rfl:   •  Chlorcyclizine-Pseudoephed (STAHIST AD PO), Take  by mouth., Disp: , Rfl:   •  cycloSPORINE (Restasis) 0.05 % ophthalmic emulsion, Apply 1 drop to eye(s) as directed by provider Every 12 (Twelve) Hours., Disp: , Rfl:   •  DULoxetine (CYMBALTA) 60 MG capsule, Take 1 capsule by mouth Daily., Disp: 90 capsule, Rfl: 3  •  Fluticasone-Salmeterol (Advair Diskus) 500-50 MCG/ACT DISKUS, Inhale 1 puff 2 (Two) Times a Day., Disp: 1 each, Rfl: 2  •  guaiFENesin (MUCINEX) 600 MG 12 hr tablet, Take 1,200 mg by mouth 2 (Two) Times a Day As Needed for Cough or Congestion., Disp: , Rfl:   •  levothyroxine (SYNTHROID, LEVOTHROID) 50 MCG tablet, Take 1 tablet by mouth Daily., Disp: 90 tablet, Rfl: 1  •  Melatonin 10 MG tablet, Take 30 mg by mouth Every Night., Disp: , Rfl:   •  olopatadine (PATANOL) 0.1 % ophthalmic solution, Apply 1 drop to eye(s) as directed by provider Every 12 (Twelve) Hours., Disp: , Rfl:   •  omeprazole (priLOSEC) 20 MG capsule, Take 20 mg by mouth Daily., Disp: , Rfl:   •  potassium chloride (MICRO-K) 10 MEQ CR capsule, Take 1 capsule by mouth Daily., Disp: 90 capsule, Rfl: 0  •  ubrogepant 100 MG tablet, Take once with first symptom of migraine.  May taken again 2 hours later if migraine persists., Disp: 9 tablet, Rfl: 1  •  Viibryd 40 MG tablet tablet, Take 40 mg by mouth Daily., Disp: , Rfl:   •  amoxicillin (AMOXIL) 875 MG tablet, Take 1 tablet by mouth Every 12 (Twelve) Hours., Disp: 20 tablet, Rfl: 0  •  losartan-hydrochlorothiazide (HYZAAR) 100-25 MG per tablet, Take 1 tablet by mouth Daily., Disp: 30 tablet, Rfl: 0  •  metFORMIN (GLUCOPHAGE) 1000 MG tablet, Take 1 tablet by mouth 2 (Two) Times a Day With Meals., Disp: 180 tablet, Rfl: 1  •  montelukast (SINGULAIR) 10 MG  "tablet, Take 1 tablet by mouth Daily., Disp: 90 tablet, Rfl: 3    Allergies   Allergen Reactions   • Erythromycin GI Intolerance   • Sudanyl [Pseudoephedrine] Other (See Comments)     Facial swelling    • Prednisone Other (See Comments)     Hypertension         Objective     Physical Exam:  Vital Signs:   Vitals:    12/05/22 1242   BP: 114/76   Pulse: 95   Temp: 97.6 °F (36.4 °C)   SpO2: 98%   Weight: 84.8 kg (187 lb)   Height: 154.9 cm (60.98\")     Body mass index is 35.35 kg/m².    Physical Exam  Constitutional:       Appearance: She is not ill-appearing.   HENT:      Head: Normocephalic.      Right Ear: Tympanic membrane, ear canal and external ear normal.      Left Ear: Tympanic membrane, ear canal and external ear normal.      Nose: Mucosal edema present. No nasal tenderness.   Eyes:      Conjunctiva/sclera: Conjunctivae normal.      Pupils: Pupils are equal, round, and reactive to light.   Cardiovascular:      Rate and Rhythm: Normal rate and regular rhythm.      Pulses:           Radial pulses are 2+ on the right side and 2+ on the left side.        Dorsalis pedis pulses are 2+ on the right side and 2+ on the left side.      Heart sounds: Normal heart sounds.   Pulmonary:      Effort: Pulmonary effort is normal.      Breath sounds: Normal breath sounds.   Musculoskeletal:      Cervical back: Normal range of motion and neck supple.      Right lower leg: No edema.      Left lower leg: No edema.   Skin:     General: Skin is warm.      Capillary Refill: Capillary refill takes less than 2 seconds.   Neurological:      Mental Status: She is alert and oriented to person, place, and time.      Coordination: Coordination normal.      Gait: Gait normal.   Psychiatric:         Mood and Affect: Mood normal.         Behavior: Behavior normal.         Thought Content: Thought content normal.             Assessment / Plan      Assessment/Plan:   Diagnoses and all orders for this visit:    1. Type 2 diabetes mellitus without " complication, without long-term current use of insulin (HCC) (Primary)  -     Hemoglobin A1c        - Follow diabetic diet        - Monitor blood sugars as discussed, to better assess trends and glycemic response to certain food, drink, activities.  Advised fasting blood glucose should be < 130, 2 hours      post-prandial should be < 180        - See eye doctor annually or as discussed        - Wear protective foot wear/no bare feet        - Check feet regularly for calluses or ulcers        - Discussed risk of poorly controlled diabetes and long-term complications        - Exercise as tolerated for 30-45 minutes most days of the week. Gradually increase daily exercise if not currently active.        - Take all medications as prescribed    2. Hypertension associated with diabetes (HCC)  -     Comprehensive Metabolic Panel        - Follow heart healthy diet.  Keep sodium intake < 1500 mg per day.  Avoid processed & fast foods.          - Exercise as tolerated, with a goal of 30 minutes of moderate exercise most days.         - Take medications as prescribed.    3. Psychophysiologic insomnia  -     busPIRone (BUSPAR) 15 MG tablet; Take 1 tablet by mouth Every Night.  Dispense: 90 tablet; Refill: 1        - Encouraged to take part in daily physical exercise.          - Eat healthy, well balanced diet; avoid sugary foods or beverages        - Abstain from alcohol and drug use        - Ensure good night's sleep by creating calm space in bedroom, avoiding screen time 1-2 hours before bed, no caffeine after 5 pm    4. Hyperlipidemia, unspecified hyperlipidemia type  -     Lipid Panel        - Follow heart healthy diet.  Avoid processed & fast foods.          - Exercise as tolerated, with a goal of 30 minutes of moderate exercise most days.         - Take medications as prescribed.     5. Acquired hypothyroidism  -     T4, Free  -     TSH  -     levothyroxine (SYNTHROID, LEVOTHROID) 50 MCG tablet; Take 1 tablet by mouth  Daily.  Dispense: 90 tablet; Refill: 1    6. Screening for disorder of blood and blood-forming organs  -     CBC & Differential    7. Environmental and seasonal allergies        - Avoid known allergy triggers when possible, wash hands and face after exposure.  Consider changing clothes.        - HEPA filter in vacuum and HVAC        - Change/launder bed linens at least once a week        - Vacuum carpeted areas in the home, 2-3 times a week        - Continue Stahist per package directions.            Follow Up:   Return for Annual.    Patient was given instructions and counseling regarding her condition or for health maintenance advice. Please see specific information pulled into the AVS if appropriate.       [facility] [address] [facility]     Please note that portions of this note may have been completed with a voice recognition program. Efforts were made to edit dictation, but occasionally words are mistranscribed.

## 2022-12-06 LAB
ALBUMIN SERPL-MCNC: 4.5 G/DL (ref 3.5–5.2)
ALBUMIN/GLOB SERPL: 1.9 G/DL
ALP SERPL-CCNC: 55 U/L (ref 39–117)
ALT SERPL-CCNC: 22 U/L (ref 1–33)
AST SERPL-CCNC: 26 U/L (ref 1–32)
BASOPHILS # BLD AUTO: 0.04 10*3/MM3 (ref 0–0.2)
BASOPHILS NFR BLD AUTO: 0.5 % (ref 0–1.5)
BILIRUB SERPL-MCNC: 0.5 MG/DL (ref 0–1.2)
BUN SERPL-MCNC: 14 MG/DL (ref 6–20)
BUN/CREAT SERPL: 16.7 (ref 7–25)
CALCIUM SERPL-MCNC: 10.1 MG/DL (ref 8.6–10.5)
CHLORIDE SERPL-SCNC: 96 MMOL/L (ref 98–107)
CHOLEST SERPL-MCNC: 121 MG/DL (ref 0–200)
CO2 SERPL-SCNC: 25.5 MMOL/L (ref 22–29)
CREAT SERPL-MCNC: 0.84 MG/DL (ref 0.57–1)
EGFRCR SERPLBLD CKD-EPI 2021: 81.2 ML/MIN/1.73
EOSINOPHIL # BLD AUTO: 0.06 10*3/MM3 (ref 0–0.4)
EOSINOPHIL NFR BLD AUTO: 0.7 % (ref 0.3–6.2)
ERYTHROCYTE [DISTWIDTH] IN BLOOD BY AUTOMATED COUNT: 12.2 % (ref 12.3–15.4)
GLOBULIN SER CALC-MCNC: 2.4 GM/DL
GLUCOSE SERPL-MCNC: 119 MG/DL (ref 65–99)
HBA1C MFR BLD: 6.1 % (ref 4.8–5.6)
HCT VFR BLD AUTO: 40 % (ref 34–46.6)
HDLC SERPL-MCNC: 72 MG/DL (ref 40–60)
HGB BLD-MCNC: 13.4 G/DL (ref 12–15.9)
IMM GRANULOCYTES # BLD AUTO: 0.03 10*3/MM3 (ref 0–0.05)
IMM GRANULOCYTES NFR BLD AUTO: 0.3 % (ref 0–0.5)
LDLC SERPL CALC-MCNC: 31 MG/DL (ref 0–100)
LYMPHOCYTES # BLD AUTO: 2.8 10*3/MM3 (ref 0.7–3.1)
LYMPHOCYTES NFR BLD AUTO: 32.4 % (ref 19.6–45.3)
MCH RBC QN AUTO: 30.4 PG (ref 26.6–33)
MCHC RBC AUTO-ENTMCNC: 33.5 G/DL (ref 31.5–35.7)
MCV RBC AUTO: 90.7 FL (ref 79–97)
MONOCYTES # BLD AUTO: 0.69 10*3/MM3 (ref 0.1–0.9)
MONOCYTES NFR BLD AUTO: 8 % (ref 5–12)
NEUTROPHILS # BLD AUTO: 5.01 10*3/MM3 (ref 1.7–7)
NEUTROPHILS NFR BLD AUTO: 58.1 % (ref 42.7–76)
NRBC BLD AUTO-RTO: 0 /100 WBC (ref 0–0.2)
PLATELET # BLD AUTO: 359 10*3/MM3 (ref 140–450)
POTASSIUM SERPL-SCNC: 4.1 MMOL/L (ref 3.5–5.2)
PROT SERPL-MCNC: 6.9 G/DL (ref 6–8.5)
RBC # BLD AUTO: 4.41 10*6/MM3 (ref 3.77–5.28)
SODIUM SERPL-SCNC: 139 MMOL/L (ref 136–145)
T4 FREE SERPL-MCNC: 1.38 NG/DL (ref 0.93–1.7)
TRIGL SERPL-MCNC: 101 MG/DL (ref 0–150)
TSH SERPL DL<=0.005 MIU/L-ACNC: 1.13 UIU/ML (ref 0.27–4.2)
VLDLC SERPL CALC-MCNC: 18 MG/DL (ref 5–40)
WBC # BLD AUTO: 8.63 10*3/MM3 (ref 3.4–10.8)

## 2022-12-06 RX ORDER — LEVOTHYROXINE SODIUM 0.05 MG/1
50 TABLET ORAL DAILY
Qty: 90 TABLET | Refills: 1 | Status: SHIPPED | OUTPATIENT
Start: 2022-12-06

## 2022-12-07 NOTE — TELEPHONE ENCOUNTER
Rx Refill Note  Requested Prescriptions     Pending Prescriptions Disp Refills   • metFORMIN (GLUCOPHAGE) 1000 MG tablet       Sig: Take 1 tablet by mouth 2 (Two) Times a Day With Meals.      Last office visit with prescribing clinician: 12/5/2022   Last telemedicine visit with prescribing clinician: Visit date not found   Next office visit with prescribing clinician: Visit date not found                         Would you like a call back once the refill request has been completed: [] Yes [] No    If the office needs to give you a call back, can they leave a voicemail: [] Yes [] No    Janessa Mattson LPN  12/07/22, 08:12 EST     THIS WAS ORIGINALLY ORDERED BY AN HISTORICAL PROVIDER.  IS REFILL APPROPRIATE?

## 2022-12-16 RX ORDER — AMOXICILLIN 875 MG/1
875 TABLET, COATED ORAL EVERY 12 HOURS SCHEDULED
Qty: 20 TABLET | Refills: 0 | Status: SHIPPED | OUTPATIENT
Start: 2022-12-16 | End: 2023-03-30

## 2022-12-20 RX ORDER — MONTELUKAST SODIUM 10 MG/1
10 TABLET ORAL DAILY
Status: CANCELLED | OUTPATIENT
Start: 2022-12-20

## 2022-12-21 ENCOUNTER — PATIENT MESSAGE (OUTPATIENT)
Dept: INTERNAL MEDICINE | Facility: CLINIC | Age: 57
End: 2022-12-21

## 2022-12-21 RX ORDER — LOSARTAN POTASSIUM AND HYDROCHLOROTHIAZIDE 25; 100 MG/1; MG/1
1 TABLET ORAL DAILY
Qty: 30 TABLET | Refills: 0 | Status: SHIPPED | OUTPATIENT
Start: 2022-12-21 | End: 2023-01-21 | Stop reason: SDUPTHER

## 2022-12-21 RX ORDER — MONTELUKAST SODIUM 10 MG/1
10 TABLET ORAL DAILY
Qty: 90 TABLET | Refills: 3 | Status: SHIPPED | OUTPATIENT
Start: 2022-12-21

## 2022-12-21 NOTE — TELEPHONE ENCOUNTER
From: Robina Dumont  To: Lisandra Mathew  Sent: 12/21/2022 11:23 AM EST  Subject: Denial    Why was the singular denied I’ve been taking it for a long time.

## 2023-01-12 NOTE — TELEPHONE ENCOUNTER
Rx Refill Note  Requested Prescriptions     Pending Prescriptions Disp Refills   • Viibryd 40 MG tablet tablet 30 tablet      Sig: Take 1 tablet by mouth Daily.      Last office visit with prescribing clinician: 12/5/2022   Last telemedicine visit with prescribing clinician: Visit date not found   Next office visit with prescribing clinician: Visit date not found                         Would you like a call back once the refill request has been completed: [] Yes [] No    If the office needs to give you a call back, can they leave a voicemail: [] Yes [] No    Tod Randle MA  01/12/23, 13:50 EST

## 2023-01-12 NOTE — TELEPHONE ENCOUNTER
Rx Refill Note  Requested Prescriptions     Pending Prescriptions Disp Refills   • ubrogepant 100 MG tablet 9 tablet 1     Sig: Take once with first symptom of migraine.  May taken again 2 hours later if migraine persists.      Last office visit with prescribing clinician: 12/5/2022   Last telemedicine visit with prescribing clinician: Visit date not found   Next office visit with prescribing clinician: Visit date not found                         Would you like a call back once the refill request has been completed: [] Yes [] No    If the office needs to give you a call back, can they leave a voicemail: [] Yes [] No    Tod Randle MA  01/12/23, 14:44 EST

## 2023-01-13 RX ORDER — VILAZODONE HYDROCHLORIDE 40 MG/1
40 TABLET ORAL DAILY
Qty: 90 TABLET | Refills: 1 | Status: SHIPPED | OUTPATIENT
Start: 2023-01-13

## 2023-01-21 DIAGNOSIS — G62.9 NEUROPATHY: ICD-10-CM

## 2023-01-23 RX ORDER — POTASSIUM CHLORIDE 750 MG/1
10 CAPSULE, EXTENDED RELEASE ORAL DAILY
Qty: 90 CAPSULE | Refills: 0 | Status: SHIPPED | OUTPATIENT
Start: 2023-01-23

## 2023-01-23 RX ORDER — LOSARTAN POTASSIUM AND HYDROCHLOROTHIAZIDE 25; 100 MG/1; MG/1
1 TABLET ORAL DAILY
Qty: 30 TABLET | Refills: 0 | Status: SHIPPED | OUTPATIENT
Start: 2023-01-23 | End: 2023-02-19 | Stop reason: SDUPTHER

## 2023-01-23 RX ORDER — DULOXETIN HYDROCHLORIDE 60 MG/1
60 CAPSULE, DELAYED RELEASE ORAL DAILY
Qty: 90 CAPSULE | Refills: 3 | Status: SHIPPED | OUTPATIENT
Start: 2023-01-23

## 2023-02-20 RX ORDER — LOSARTAN POTASSIUM AND HYDROCHLOROTHIAZIDE 25; 100 MG/1; MG/1
1 TABLET ORAL DAILY
Qty: 30 TABLET | Refills: 0 | Status: SHIPPED | OUTPATIENT
Start: 2023-02-20 | End: 2023-03-24

## 2023-03-01 ENCOUNTER — TELEMEDICINE (OUTPATIENT)
Dept: INTERNAL MEDICINE | Facility: CLINIC | Age: 58
End: 2023-03-01
Payer: COMMERCIAL

## 2023-03-01 ENCOUNTER — PRIOR AUTHORIZATION (OUTPATIENT)
Dept: INTERNAL MEDICINE | Facility: CLINIC | Age: 58
End: 2023-03-01

## 2023-03-01 DIAGNOSIS — G43.709 CHRONIC MIGRAINE WITHOUT AURA WITHOUT STATUS MIGRAINOSUS, NOT INTRACTABLE: ICD-10-CM

## 2023-03-01 DIAGNOSIS — E11.9 TYPE 2 DIABETES MELLITUS WITHOUT COMPLICATION, WITHOUT LONG-TERM CURRENT USE OF INSULIN: ICD-10-CM

## 2023-03-01 DIAGNOSIS — G47.33 OSA ON CPAP: ICD-10-CM

## 2023-03-01 DIAGNOSIS — Z99.89 OSA ON CPAP: ICD-10-CM

## 2023-03-01 DIAGNOSIS — F51.04 PSYCHOPHYSIOLOGIC INSOMNIA: Primary | ICD-10-CM

## 2023-03-01 PROCEDURE — 1160F RVW MEDS BY RX/DR IN RCRD: CPT | Performed by: NURSE PRACTITIONER

## 2023-03-01 PROCEDURE — 99214 OFFICE O/P EST MOD 30 MIN: CPT | Performed by: NURSE PRACTITIONER

## 2023-03-01 PROCEDURE — 1159F MED LIST DOCD IN RCRD: CPT | Performed by: NURSE PRACTITIONER

## 2023-03-01 RX ORDER — TRAZODONE HYDROCHLORIDE 50 MG/1
TABLET ORAL
Qty: 42 TABLET | Refills: 0 | Status: SHIPPED | OUTPATIENT
Start: 2023-03-01 | End: 2023-03-29 | Stop reason: SDUPTHER

## 2023-03-01 RX ORDER — ZOLPIDEM TARTRATE 5 MG/1
5 TABLET ORAL NIGHTLY PRN
Qty: 20 TABLET | Refills: 0 | Status: SHIPPED | OUTPATIENT
Start: 2023-03-01

## 2023-03-01 RX ORDER — PEN NEEDLE, DIABETIC 31 G X1/4"
1 NEEDLE, DISPOSABLE MISCELLANEOUS DAILY
Qty: 90 EACH | Refills: 3 | Status: SHIPPED | OUTPATIENT
Start: 2023-03-01

## 2023-03-01 NOTE — PROGRESS NOTES
You have chosen to receive care through a telehealth visit.  Do you consent to use a video/audio connection for your medical care today? Yes  Active parties: Cheyenne SHAHID, Sandra Saucedo CMA, Robina Dumont

## 2023-03-01 NOTE — TELEPHONE ENCOUNTER
Approved today    The request has been approved. The authorization is effective for a maximum of 6 fills from 03/01/2023 to 08/31/2023, as long as the member is enrolled in their current health plan. The request was approved as submitted. A written notification letter will follow with additional details.    Pt informed

## 2023-03-01 NOTE — PROGRESS NOTES
Mode of Visit: Video  Location of patient: home  You have chosen to receive care through a telehealth visit.  Does the patient consent to use a video/audio connection for your medical care today? Yes  The visit included audio and video interaction. No technical issues occurred during this visit.    Date: 2023  Name: Robina Dumont  : 1965         Chief Complaint:   Chief Complaint   Patient presents with   • Anxiety     Buspar not working         HPI:  Robina Dumont is a 58 y.o. female presents for video visit in regard to anxiety, insomnia, headaches.     Anxiety, insomnia: has been taking buspar for both anxiety and insomnia.  Not working as well as it initially did to help her sleep, works well for anxiety.  Continues to take Viibryd as prescribed.  Has taken Ambien, Lunesta in the past for insomnia. She uses CPAP for JIMMY.  Has not slept well for several nights, is exhausted.  Denies depressed mood, anhedonia, feelings of worthlessness, difficulty concentrating, impaired memory, SI, HI, panic attacks, weight change.    T2DM: taking metformin, liraglutide as prescribed.  Last A1c 6.1 in .  Patient denies foot ulcerations, hyperglycemia, hypoglycemia, nausea, paresthesia of the feet, polydipsia, polyuria, polyphagia, visual disturbances and weight loss.     Migraines: takes ubrogepant prn.  Works well, has reduced headache intensity and duration.      History: The following portions of the patient's history were reviewed and updated as appropriate: allergies, current medications, past medical history, family history, surgical history, social history and problem list.      ROS:  Review of Systems      PE:  Physical Exam   Constitutional: She appears well-developed and well-nourished. No distress.   Dark circles beneath bilateral eyes   HENT:   Head: Normocephalic.   Right Ear: External ear normal.   Left Ear: External ear normal.   Eyes: Pupils are equal, round, and reactive to  light. Conjunctivae are normal.   Neck: Neck normal appearance.  Pulmonary/Chest: Effort normal.   Neurological: She is alert.   Oriented x 3   Skin: No pallor.   Psychiatric: She has a normal mood and affect. Her speech is normal and behavior is normal. Thought content normal. Her affect is normal.          Assessment/Plan:  Diagnoses and all orders for this visit:    1. Psychophysiologic insomnia (Primary)  -     traZODone (DESYREL) 50 MG tablet; Take 1 tablet by mouth Every Night for 14 days, THEN 2 tablets Every Night for 14 days.  Dispense: 42 tablet; Refill: 0  -     zolpidem (AMBIEN) 5 MG tablet; Take 1 tablet by mouth At Night As Needed for Sleep.  Dispense: 20 tablet; Refill: 0.  To be taken only as needed until we determine if trazodone will be effective.         - Encouraged to take part in daily physical exercise.          - Eat healthy, well balanced diet; avoid sugary foods or beverages        - Abstain from alcohol and drugs         - Ensure good night's sleep by creating calm space in bedroom, avoiding screen time 1-2 hours before bed, no caffeine after 5 pm    2. JIMMY on CPAP        - Continue CPAP as prescribed     3. Type 2 diabetes mellitus without complication, without long-term current use of insulin (HCC)  -     Liraglutide (VICTOZA) 18 MG/3ML solution pen-injector injection; Inject 1.8 mg under the skin into the appropriate area as directed Daily.  Dispense: 27 mL; Refill: 3  -     Insulin Pen Needle (Pen Needles) 31G X 6 MM misc; 1 application Daily.  Dispense: 90 each; Refill: 3        - Follow diabetic diet        - See eye doctor annually or as discussed        - Wear protective foot wear/no bare feet        - Check feet regularly for calluses or ulcers        - Discussed risk of poorly controlled diabetes and long-term complications        - Exercise as tolerated for 30-45 minutes most days of the week. Gradually increase daily exercise if not currently active.        - Take all  medications as prescribed    4. Chronic migraine without aura without status migrainosus, not intractable        - Avoid known triggers when possible        - Take ubrogepant 100 mg once when she first notes symptoms of migraine.  May repeat in 2 hours, prn, once.          Return in about 3 months (around 6/1/2023) for Next scheduled follow up.  Patient was given instructions and counseling regarding her condition or for health maintenance advice. Please see specific information pulled into the AVS if appropriate.

## 2023-03-01 NOTE — TELEPHONE ENCOUNTER
Key: [id number]    Sent to [facility] today    Drug  Zolpidem Tartrate 5MG tablets  Form  [employer] [place] Medicaid ePA Form 2017 NCPDP

## 2023-03-06 RX ORDER — FLUTICASONE PROPIONATE AND SALMETEROL 500; 50 UG/1; UG/1
1 POWDER RESPIRATORY (INHALATION)
Qty: 1 EACH | Refills: 2 | Status: SHIPPED | OUTPATIENT
Start: 2023-03-06

## 2023-03-06 RX ORDER — FLUTICASONE PROPIONATE AND SALMETEROL 50; 500 UG/1; UG/1
POWDER RESPIRATORY (INHALATION)
Qty: 180 EACH | Refills: 0 | Status: SHIPPED | OUTPATIENT
Start: 2023-03-06

## 2023-03-06 NOTE — TELEPHONE ENCOUNTER
Rx Refill Note  Requested Prescriptions     Pending Prescriptions Disp Refills    ubrogepant (Ubrelvy) 100 MG tablet 9 tablet 1     Sig: Take once with first symptom of migraine.  May taken again 2 hours later if migraine persists.      Last office visit with prescribing clinician: 12/5/2022   Last telemedicine visit with prescribing clinician: Visit date not found   Next office visit with prescribing clinician: Visit date not found                         Would you like a call back once the refill request has been completed: [] Yes [] No    If the office needs to give you a call back, can they leave a voicemail: [] Yes [] No    Aylin Vergara MA  03/06/23, 16:23 EST

## 2023-03-10 ENCOUNTER — PRIOR AUTHORIZATION (OUTPATIENT)
Dept: INTERNAL MEDICINE | Facility: CLINIC | Age: 58
End: 2023-03-10
Payer: COMMERCIAL

## 2023-03-10 NOTE — TELEPHONE ENCOUNTER
Key: [id number]  Sent to Plan today  Drug  Ubrelvy 100MG tablets  Form  [employer] [place] [employer] ePA Form 2017 NCPDP  Original Claim Info  75

## 2023-03-13 NOTE — TELEPHONE ENCOUNTER
Denied on March 10    This request has not been approved. Based on the information submitted for review, you did not meet our guideline rules for the requested drug. In order for your request to be approved, your provider would need to show that you have met the guideline rules below. The details below are written in medical language. If you have questions, please contact your provider. In some cases, the requested medication or alternatives offered may have additional approval requirements. Our guideline named ANTI-MIGRAINE CGRP INHIBITORS requires the following rule(s) be met for renewal: A. The prescriber attests that the member is experiencing resolution of headache pain or reduction in headache severity.Your doctor told us you have a diagnosis of migraine headaches. We do not have chart notes or records showing you are experiencing resolution of headache pain or reduction in headache severity with therapy. This is why your request is denied. Please work with your doctor to use a different medication or get us more information if it will allow us to approve this request. A written notification letter will follow with additional details.

## 2023-03-15 NOTE — TELEPHONE ENCOUNTER
Message from ins faxed to us re appeal  [employer] will notify a determination no later than 72 hours from receipt of appeal, expected determination 03/17/23, question call [phone]

## 2023-03-17 NOTE — TELEPHONE ENCOUNTER
Approved on March 16  This request has been approved. The authorization is effective for a maximum of 12 fills from [id number] to [id number] , as long as the member is enrolled in their current health plan A written notification letter will follow with additional details.    Pt informed

## 2023-03-24 RX ORDER — LOSARTAN POTASSIUM AND HYDROCHLOROTHIAZIDE 25; 100 MG/1; MG/1
TABLET ORAL
Qty: 90 TABLET | Refills: 1 | Status: SHIPPED | OUTPATIENT
Start: 2023-03-24

## 2023-03-29 DIAGNOSIS — F51.04 PSYCHOPHYSIOLOGIC INSOMNIA: ICD-10-CM

## 2023-03-30 ENCOUNTER — OFFICE VISIT (OUTPATIENT)
Dept: INTERNAL MEDICINE | Facility: CLINIC | Age: 58
End: 2023-03-30
Payer: COMMERCIAL

## 2023-03-30 VITALS
TEMPERATURE: 96.8 F | BODY MASS INDEX: 37 KG/M2 | HEIGHT: 61 IN | RESPIRATION RATE: 16 BRPM | DIASTOLIC BLOOD PRESSURE: 72 MMHG | SYSTOLIC BLOOD PRESSURE: 121 MMHG | OXYGEN SATURATION: 97 % | WEIGHT: 196 LBS | HEART RATE: 84 BPM

## 2023-03-30 DIAGNOSIS — J06.9 ACUTE URI: ICD-10-CM

## 2023-03-30 DIAGNOSIS — J02.9 SORE THROAT: Primary | ICD-10-CM

## 2023-03-30 LAB
EXPIRATION DATE: NORMAL
INTERNAL CONTROL: NORMAL
Lab: NORMAL
S PYO AG THROAT QL: NEGATIVE

## 2023-03-30 PROCEDURE — 87880 STREP A ASSAY W/OPTIC: CPT | Performed by: NURSE PRACTITIONER

## 2023-03-30 PROCEDURE — 99213 OFFICE O/P EST LOW 20 MIN: CPT | Performed by: NURSE PRACTITIONER

## 2023-03-30 PROCEDURE — 1159F MED LIST DOCD IN RCRD: CPT | Performed by: NURSE PRACTITIONER

## 2023-03-30 PROCEDURE — 1160F RVW MEDS BY RX/DR IN RCRD: CPT | Performed by: NURSE PRACTITIONER

## 2023-03-30 PROCEDURE — 3074F SYST BP LT 130 MM HG: CPT | Performed by: NURSE PRACTITIONER

## 2023-03-30 PROCEDURE — 3078F DIAST BP <80 MM HG: CPT | Performed by: NURSE PRACTITIONER

## 2023-03-30 RX ORDER — AMOXICILLIN AND CLAVULANATE POTASSIUM 875; 125 MG/1; MG/1
1 TABLET, FILM COATED ORAL 2 TIMES DAILY
Qty: 20 TABLET | Refills: 0 | Status: SHIPPED | OUTPATIENT
Start: 2023-03-30 | End: 2023-04-09

## 2023-03-30 NOTE — TELEPHONE ENCOUNTER
Rx Refill Note  Requested Prescriptions     Pending Prescriptions Disp Refills   • amLODIPine (NORVASC) 2.5 MG tablet       Sig: Take 1 tablet by mouth Daily.   • traZODone (DESYREL) 50 MG tablet 42 tablet 0     Sig: Take 1 tablet by mouth Every Night for 14 days, THEN 2 tablets Every Night for 14 days.      Last office visit with prescribing clinician: 12/5/2022   Last telemedicine visit with prescribing clinician: Visit date not found   Next office visit with prescribing clinician: Visit date not found                         Would you like a call back once the refill request has been completed: [] Yes [] No    If the office needs to give you a call back, can they leave a voicemail: [] Yes [] No    Tod Randle MA  03/30/23, 07:31 EDT

## 2023-03-30 NOTE — PROGRESS NOTES
Chief Complaint / Reason:      Chief Complaint   Patient presents with   • Sore Throat     X 1 week       Subjective     HPI  Patient presents today with complaints of sore throat that has been going on for about a week.  She denies fever and chills.  Patient does have history of reflux sleep apnea and seasonal allergies.  Over-the-counter patient with minimal relief.  History taken from: patient    PMH/FH/Social History were reviewed and updated appropriately in the electronic medical record.   Past Medical History:   Diagnosis Date   • Anxiety    • Anxiety and depression    • Arthritis    • Asthma    • Depression    • Diabetes mellitus    • Disease of thyroid gland    • GERD (gastroesophageal reflux disease)    • Hyperlipidemia    • Hypertension    • Migraines    • Sleep apnea     c-pap setting reported as 13      Past Surgical History:   Procedure Laterality Date   • ABDOMINAL SURGERY      laparoscopic expolratory   • BARIATRIC SURGERY  11/2019   • BLEPHAROPLASTY Bilateral    • CATARACT EXTRACTION, BILATERAL     • CERVIX LESION DESTRUCTION     • COLONOSCOPY  2017   • COSMETIC SURGERY Bilateral     blepharoplasty    • EYE SURGERY Bilateral     cataract   • FINGER/THUMB ARTHROPLASTY Bilateral    • GASTRIC BYPASS     • JOINT REPLACEMENT Bilateral     thumb    • KNEE ARTHROSCOPY Right    • TOTAL HIP ARTHROPLASTY Left 06/23/2021    Procedure: TOTAL HIP ARTHROPLASTY LEFT;  Surgeon: Jeremiah Alcala MD;  Location: [facility];  Service: Orthopedics;  Laterality: Left;     Social History     Socioeconomic History   • Marital status:    Tobacco Use   • Smoking status: Never   • Smokeless tobacco: Never   Vaping Use   • Vaping Use: Never used   Substance and Sexual Activity   • Alcohol use: Yes     Comment: rarely   • Drug use: Never   • Sexual activity: Not Currently     Partners: Male     Family History   Problem Relation Age of Onset   • Heart attack Mother    • Hypertension Mother    • Cancer Mother    •  Diabetes Father    • Heart attack Father    • Hypertension Father    • Diabetes Paternal Grandmother        Review of Systems:   Review of Systems      All other systems were reviewed and are negative.  Exceptions are noted in the subjective or above.      Objective     Vital Signs  Vitals:    03/30/23 1642   BP: 121/72   Pulse: 84   Resp: 16   Temp: 96.8 °F (36 °C)   SpO2: 97%       Body mass index is 37.06 kg/m².    Physical Exam  Vitals and nursing note reviewed.   Constitutional:       General: She is not in acute distress.     Appearance: She is well-developed.   HENT:      Head: Normocephalic and atraumatic.      Right Ear: Ear canal and external ear normal. Tympanic membrane is erythematous and bulging.      Left Ear: Ear canal and external ear normal. Tympanic membrane is erythematous and bulging.      Nose: Mucosal edema, congestion and rhinorrhea present.      Right Sinus: Maxillary sinus tenderness and frontal sinus tenderness present.      Left Sinus: Maxillary sinus tenderness and frontal sinus tenderness present.      Mouth/Throat:      Mouth: Mucous membranes are dry.      Pharynx: Posterior oropharyngeal erythema present. No oropharyngeal exudate.   Eyes:      Conjunctiva/sclera: Conjunctivae normal.      Right eye: Right conjunctiva is not injected.      Left eye: Left conjunctiva is not injected.      Pupils: Pupils are equal, round, and reactive to light.   Cardiovascular:      Rate and Rhythm: Normal rate and regular rhythm.      Pulses: Normal pulses.      Heart sounds: Normal heart sounds.   Pulmonary:      Effort: Pulmonary effort is normal. No respiratory distress.      Breath sounds: Normal breath sounds.   Lymphadenopathy:      Head:      Right side of head: No submental, submandibular or tonsillar adenopathy.      Left side of head: No submental, submandibular or tonsillar adenopathy.      Cervical: No cervical adenopathy.   Skin:     General: Skin is warm and dry.      Capillary Refill:  Capillary refill takes less than 2 seconds.   Neurological:      Mental Status: She is alert and oriented to person, place, and time.              Results Review:    I reviewed the patient's new clinical results.   Office Visit on 03/30/2023   Component Date Value Ref Range Status   • Rapid Strep A Screen 03/30/2023 Negative  Negative, VALID, INVALID, Not Performed Final   • Internal Control 03/30/2023 Passed  Passed Final   • Lot Number 03/30/2023 [id number]   Final   • Expiration Date 03/30/2023 9/23/2025   Final         Medication Review:     Current Outpatient Medications:   •  acetaminophen (TYLENOL) 500 MG tablet, Take 2 tablets by mouth Every 8 (Eight) Hours x1 week, then take as needed., Disp: 42 tablet, Rfl: 0  •  Advair Diskus 500-50 MCG/ACT DISKUS, INHALE 1 DOSE BY MOUTH TWICE DAILY, Disp: 180 each, Rfl: 0  •  albuterol (ProAir RespiClick) 108 (90 Base) MCG/ACT inhaler, Inhale 2 puffs Every 6 (Six) Hours As Needed., Disp: , Rfl:   •  amLODIPine (NORVASC) 2.5 MG tablet, Take 1 tablet by mouth Daily., Disp: , Rfl:   •  ARIPiprazole (ABILIFY) 2 MG tablet, Take 1 tablet by mouth Daily., Disp: 90 tablet, Rfl: 1  •  atorvastatin (LIPITOR) 40 MG tablet, Take 1 tablet by mouth Daily., Disp: 90 tablet, Rfl: 1  •  cetirizine (zyrTEC) 10 MG tablet, Take 1 tablet by mouth Daily., Disp: , Rfl:   •  Chlorcyclizine-Pseudoephed (STAHIST AD PO), Take  by mouth., Disp: , Rfl:   •  cycloSPORINE (Restasis) 0.05 % ophthalmic emulsion, Apply 1 drop to eye(s) as directed by provider Every 12 (Twelve) Hours., Disp: , Rfl:   •  DULoxetine (CYMBALTA) 60 MG capsule, Take 1 capsule by mouth Daily., Disp: 90 capsule, Rfl: 3  •  Fluticasone-Salmeterol (Advair Diskus) 500-50 MCG/ACT DISKUS, Inhale 1 puff 2 (Two) Times a Day., Disp: 1 each, Rfl: 2  •  guaiFENesin (MUCINEX) 600 MG 12 hr tablet, Take 2 tablets by mouth 2 (Two) Times a Day As Needed for Cough or Congestion., Disp: , Rfl:   •  Insulin Pen Needle (Pen Needles) 31G X 6 MM  misc, 1 application Daily., Disp: 90 each, Rfl: 3  •  levothyroxine (SYNTHROID, LEVOTHROID) 50 MCG tablet, Take 1 tablet by mouth Daily., Disp: 90 tablet, Rfl: 1  •  Liraglutide (VICTOZA) 18 MG/3ML solution pen-injector injection, Inject 1.8 mg under the skin into the appropriate area as directed Daily., Disp: 27 mL, Rfl: 3  •  losartan-hydrochlorothiazide (HYZAAR) 100-25 MG per tablet, Take 1 tablet by mouth once daily, Disp: 90 tablet, Rfl: 1  •  Melatonin 10 MG tablet, Take 3 tablets by mouth Every Night., Disp: , Rfl:   •  metFORMIN (GLUCOPHAGE) 1000 MG tablet, Take 1 tablet by mouth 2 (Two) Times a Day With Meals., Disp: 180 tablet, Rfl: 1  •  montelukast (SINGULAIR) 10 MG tablet, Take 1 tablet by mouth Daily., Disp: 90 tablet, Rfl: 3  •  olopatadine (PATANOL) 0.1 % ophthalmic solution, Apply 1 drop to eye(s) as directed by provider Every 12 (Twelve) Hours., Disp: , Rfl:   •  omeprazole (priLOSEC) 20 MG capsule, Take 1 capsule by mouth Daily., Disp: , Rfl:   •  potassium chloride (MICRO-K) 10 MEQ CR capsule, Take 1 capsule by mouth Daily., Disp: 90 capsule, Rfl: 0  •  ubrogepant (Ubrelvy) 100 MG tablet, Take once with first symptom of migraine.  May taken again 2 hours later if migraine persists., Disp: 9 tablet, Rfl: 1  •  Viibryd 40 MG tablet tablet, Take 1 tablet by mouth Daily., Disp: 90 tablet, Rfl: 1  •  zolpidem (AMBIEN) 5 MG tablet, Take 1 tablet by mouth At Night As Needed for Sleep., Disp: 20 tablet, Rfl: 0  •  traZODone (DESYREL) 50 MG tablet, Take 1 tablet by mouth Every Night for 14 days, THEN 2 tablets Every Night for 14 days., Disp: 42 tablet, Rfl: 0    Diagnoses and all orders for this visit:    Sore throat  -     POC Rapid Strep A    Acute URI  -     amoxicillin-clavulanate (Augmentin) 875-125 MG per tablet; Take 1 tablet by mouth 2 (Two) Times a Day for 10 days.    Discussed salt water gargles and advised patient to increase water intake.  Recommend good oral hygiene and advised patient cough  and deep breathe.  Discussed worsening signs and symptoms.      Return if symptoms worsen or fail to improve.    Lisandra Rowell, APRN  03/30/2023

## 2023-03-31 RX ORDER — AMLODIPINE BESYLATE 2.5 MG/1
2.5 TABLET ORAL DAILY
Qty: 90 TABLET | Refills: 3 | Status: SHIPPED | OUTPATIENT
Start: 2023-03-31

## 2023-03-31 RX ORDER — TRAZODONE HYDROCHLORIDE 100 MG/1
100 TABLET ORAL NIGHTLY
Qty: 90 TABLET | Refills: 3 | Status: SHIPPED | OUTPATIENT
Start: 2023-03-31

## 2023-05-01 RX ORDER — ATORVASTATIN CALCIUM 40 MG/1
TABLET, FILM COATED ORAL
Qty: 90 TABLET | Refills: 1 | Status: SHIPPED | OUTPATIENT
Start: 2023-05-01

## 2023-05-16 ENCOUNTER — PRIOR AUTHORIZATION (OUTPATIENT)
Dept: INTERNAL MEDICINE | Facility: CLINIC | Age: 58
End: 2023-05-16
Payer: COMMERCIAL

## 2023-05-16 DIAGNOSIS — F51.04 PSYCHOPHYSIOLOGIC INSOMNIA: ICD-10-CM

## 2023-05-16 RX ORDER — ARIPIPRAZOLE 2 MG/1
2 TABLET ORAL DAILY
Qty: 90 TABLET | Refills: 1 | Status: SHIPPED | OUTPATIENT
Start: 2023-05-16

## 2023-05-16 RX ORDER — TRAZODONE HYDROCHLORIDE 100 MG/1
200 TABLET ORAL NIGHTLY
Qty: 180 TABLET | Refills: 1 | Status: SHIPPED | OUTPATIENT
Start: 2023-05-16

## 2023-05-16 RX ORDER — POTASSIUM CHLORIDE 750 MG/1
10 CAPSULE, EXTENDED RELEASE ORAL DAILY
Qty: 90 CAPSULE | Refills: 1 | Status: SHIPPED | OUTPATIENT
Start: 2023-05-16

## 2023-05-16 NOTE — TELEPHONE ENCOUNTER
Rx Refill Note  Requested Prescriptions     Pending Prescriptions Disp Refills    ARIPiprazole (ABILIFY) 2 MG tablet 90 tablet 1     Sig: Take 1 tablet by mouth Daily.    potassium chloride (MICRO-K) 10 MEQ CR capsule 90 capsule 1     Sig: Take 1 capsule by mouth Daily.    traZODone (DESYREL) 100 MG tablet 90 tablet 1     Sig: Take 1 tablet by mouth Every Night.      Last office visit with prescribing clinician: 12/5/2022   Last telemedicine visit with prescribing clinician: 4/29/2023   Next office visit with prescribing clinician: Visit date not found                         Would you like a call back once the refill request has been completed: [] Yes [] No    If the office needs to give you a call back, can they leave a voicemail: [] Yes [] No    Johnson Izquierdo Rep  05/16/23, 12:34 EDT

## 2023-05-16 NOTE — TELEPHONE ENCOUNTER
Key: [id number]    aRIPiprazole 2MG tablets  Form  [employer] [place] [employer] ePA Form 2017 NCPDP  Original Claim Info  75

## 2023-05-16 NOTE — TELEPHONE ENCOUNTER
Outcome    Approved today     The request has been approved. The authorization is effective for a maximum of 12 fills from 05/16/2023 to 05/15/2024, as long as the member is enrolled in their current health plan. The request was approved as submitted. A written notification letter will follow with additional details.

## 2023-05-30 DIAGNOSIS — F51.04 PSYCHOPHYSIOLOGIC INSOMNIA: ICD-10-CM

## 2023-05-30 NOTE — TELEPHONE ENCOUNTER
Rx Refill Note  Requested Prescriptions     Pending Prescriptions Disp Refills    metFORMIN (GLUCOPHAGE) 1000 MG tablet 180 tablet 1     Sig: Take 1 tablet by mouth 2 (Two) Times a Day With Meals.    zolpidem (AMBIEN) 5 MG tablet 20 tablet 0     Sig: Take 1 tablet by mouth At Night As Needed for Sleep.    ubrogepant (Ubrelvy) 100 MG tablet 9 tablet 1     Sig: Take once with first symptom of migraine.  May taken again 2 hours later if migraine persists.    losartan-hydrochlorothiazide (HYZAAR) 100-25 MG per tablet 90 tablet 1     Sig: Take 1 tablet by mouth Daily.      Last office visit with prescribing clinician: 12/5/2022   Last telemedicine visit with prescribing clinician: 3/1/2023   Next office visit with prescribing clinician: Visit date not found                         Would you like a call back once the refill request has been completed: [] Yes [] No    If the office needs to give you a call back, can they leave a voicemail: [] Yes [] No    Bharti Roca MA  05/30/23, 16:24 EDT

## 2023-05-31 RX ORDER — LOSARTAN POTASSIUM AND HYDROCHLOROTHIAZIDE 25; 100 MG/1; MG/1
1 TABLET ORAL DAILY
Qty: 90 TABLET | Refills: 0 | Status: SHIPPED | OUTPATIENT
Start: 2023-05-31

## 2023-05-31 RX ORDER — ZOLPIDEM TARTRATE 5 MG/1
5 TABLET ORAL NIGHTLY PRN
Qty: 20 TABLET | Refills: 0 | OUTPATIENT
Start: 2023-05-31

## 2023-06-08 RX ORDER — FLUTICASONE PROPIONATE AND SALMETEROL 50; 500 UG/1; UG/1
POWDER RESPIRATORY (INHALATION)
Qty: 180 EACH | Refills: 0 | Status: SHIPPED | OUTPATIENT
Start: 2023-06-08

## 2023-06-15 ENCOUNTER — OFFICE VISIT (OUTPATIENT)
Dept: INTERNAL MEDICINE | Facility: CLINIC | Age: 58
End: 2023-06-15
Payer: COMMERCIAL

## 2023-06-15 VITALS
DIASTOLIC BLOOD PRESSURE: 80 MMHG | RESPIRATION RATE: 16 BRPM | SYSTOLIC BLOOD PRESSURE: 118 MMHG | OXYGEN SATURATION: 98 % | TEMPERATURE: 96.4 F | HEART RATE: 89 BPM | HEIGHT: 61 IN | WEIGHT: 185 LBS | BODY MASS INDEX: 34.93 KG/M2

## 2023-06-15 DIAGNOSIS — Z12.31 ENCOUNTER FOR SCREENING MAMMOGRAM FOR MALIGNANT NEOPLASM OF BREAST: ICD-10-CM

## 2023-06-15 DIAGNOSIS — I15.2 HYPERTENSION ASSOCIATED WITH DIABETES: ICD-10-CM

## 2023-06-15 DIAGNOSIS — Z12.4 PAP SMEAR FOR CERVICAL CANCER SCREENING: ICD-10-CM

## 2023-06-15 DIAGNOSIS — E11.59 HYPERTENSION ASSOCIATED WITH DIABETES: ICD-10-CM

## 2023-06-15 DIAGNOSIS — F51.04 PSYCHOPHYSIOLOGIC INSOMNIA: ICD-10-CM

## 2023-06-15 DIAGNOSIS — E66.09 CLASS 1 OBESITY DUE TO EXCESS CALORIES WITH SERIOUS COMORBIDITY AND BODY MASS INDEX (BMI) OF 34.0 TO 34.9 IN ADULT: ICD-10-CM

## 2023-06-15 DIAGNOSIS — Z51.81 MEDICATION MONITORING ENCOUNTER: ICD-10-CM

## 2023-06-15 DIAGNOSIS — Z11.59 ENCOUNTER FOR HEPATITIS C SCREENING TEST FOR LOW RISK PATIENT: ICD-10-CM

## 2023-06-15 DIAGNOSIS — E03.9 ACQUIRED HYPOTHYROIDISM: ICD-10-CM

## 2023-06-15 DIAGNOSIS — Z00.00 ANNUAL PHYSICAL EXAM: Primary | ICD-10-CM

## 2023-06-15 DIAGNOSIS — E11.9 TYPE 2 DIABETES MELLITUS WITHOUT COMPLICATION, WITHOUT LONG-TERM CURRENT USE OF INSULIN: ICD-10-CM

## 2023-06-15 DIAGNOSIS — E78.5 HYPERLIPIDEMIA, UNSPECIFIED HYPERLIPIDEMIA TYPE: ICD-10-CM

## 2023-06-15 RX ORDER — ZOLPIDEM TARTRATE 5 MG/1
5 TABLET ORAL NIGHTLY PRN
Qty: 20 TABLET | Refills: 0 | Status: SHIPPED | OUTPATIENT
Start: 2023-06-15

## 2023-06-15 NOTE — PROGRESS NOTES
Subjective   Robina Dumont is a 58 y.o. female and is here for a comprehensive physical exam, pap smear.     T2DM: A1c 6.1 6 months ago, stable. Currently taking metformin as prescribed.  Patient denies foot ulcerations, polydipsia, polyuria, polyphagia, visual disturbances and weight loss. Due diabetic eye and foot exam.      HTN, Hyperlipidemia: taking amlodipine, atorvastatin, losartan-hctz as prescribed.  Denies chest pain, dyspnea, orthopnea, palpitations, lower extremity edema, confusion, headaches, weakness, visual disturbances.    HCM: doing pap today, mammogram ordered, pt reports had colonoscopy <10 years ago with normal results at [facility], records requested. Records also requested from previous [facility].     HPI:    Health Habits:  Eye exam: due   Dental exam: due   Exercise: no purposeful exercise, active   Diet: no particular diet, tries to limit sweets and carbs   Caffeine: daily, mountain dew zero  Alcohol: seldom   Nicotine: No   The ASCVD Risk score (Felecia LANE, et al., 2019) failed to calculate for the following reasons:    The valid total cholesterol range is 130 to 320 mg/dL  Do you take any herbs or supplements that were not prescribed by a doctor? yes     History:  LMP: No LMP recorded. Patient is postmenopausal.  Menopause: Yes  Last pap date: unk  Abnormal pap? no  Family history of breast, cervical, colon cancer: no  Mammogram: unk       reports that she is not currently sexually active and has had partner(s) who are male.        Past Medical History:   Diagnosis Date    Anxiety     Anxiety and depression     Arthritis     Asthma     Depression     Diabetes mellitus     Disease of thyroid gland     GERD (gastroesophageal reflux disease)     Hyperlipidemia     Hypertension     Migraines     Sleep apnea     c-pap setting reported as 13        Past Surgical History:   Procedure Laterality Date    ABDOMINAL SURGERY      laparoscopic expolratory     BARIATRIC SURGERY  2019    BLEPHAROPLASTY Bilateral     CATARACT EXTRACTION, BILATERAL      CERVIX LESION DESTRUCTION      COLONOSCOPY      COSMETIC SURGERY Bilateral     blepharoplasty     EYE SURGERY Bilateral     cataract    FINGER/THUMB ARTHROPLASTY Bilateral     GASTRIC BYPASS      JOINT REPLACEMENT Bilateral     thumb     KNEE ARTHROSCOPY Right     TOTAL HIP ARTHROPLASTY Left 2021    Procedure: TOTAL HIP ARTHROPLASTY LEFT;  Surgeon: Jeremiah Alcala MD;  Location: [facility];  Service: Orthopedics;  Laterality: Left;       Family History   Problem Relation Age of Onset    Heart attack Mother     Hypertension Mother     Cancer Mother             Diabetes Father     Heart attack Father     Hypertension Father     Diabetes Paternal Grandmother        Social History     Socioeconomic History    Marital status:    Tobacco Use    Smoking status: Never    Smokeless tobacco: Never   Vaping Use    Vaping Use: Never used   Substance and Sexual Activity    Alcohol use: Yes     Comment: rarely    Drug use: Never    Sexual activity: Not Currently     Partners: Male       Allergies   Allergen Reactions    Erythromycin GI Intolerance    Sudanyl [Pseudoephedrine] Other (See Comments)     Facial swelling     Prednisone Other (See Comments)     Hypertension           Current Outpatient Medications:     acetaminophen (TYLENOL) 500 MG tablet, Take 2 tablets by mouth Every 8 (Eight) Hours x1 week, then take as needed., Disp: 42 tablet, Rfl: 0    Advair Diskus 500-50 MCG/ACT DISKUS, INHALE 1 DOSE BY MOUTH TWICE DAILY, Disp: 180 each, Rfl: 0    albuterol (ProAir RespiClick) 108 (90 Base) MCG/ACT inhaler, Inhale 2 puffs Every 6 (Six) Hours As Needed., Disp: , Rfl:     amLODIPine (NORVASC) 2.5 MG tablet, Take 1 tablet by mouth Daily., Disp: 90 tablet, Rfl: 3    ARIPiprazole (ABILIFY) 2 MG tablet, Take 1 tablet by mouth Daily., Disp: 90 tablet, Rfl: 1    atorvastatin (LIPITOR) 40 MG tablet, Take 1 tablet  by mouth once daily, Disp: 90 tablet, Rfl: 1    CBD oil (cannabidiol) capsule, Take  by mouth., Disp: , Rfl:     cetirizine (zyrTEC) 10 MG tablet, Take 1 tablet by mouth Daily., Disp: , Rfl:     Chlorcyclizine-Pseudoephed (STAHIST AD PO), Take  by mouth., Disp: , Rfl:     cycloSPORINE (Restasis) 0.05 % ophthalmic emulsion, Apply 1 drop to eye(s) as directed by provider Every 12 (Twelve) Hours., Disp: , Rfl:     DULoxetine (CYMBALTA) 60 MG capsule, Take 1 capsule by mouth Daily., Disp: 90 capsule, Rfl: 3    Fluticasone-Salmeterol (Advair Diskus) 500-50 MCG/ACT DISKUS, Inhale 1 puff 2 (Two) Times a Day., Disp: 1 each, Rfl: 2    guaiFENesin (MUCINEX) 600 MG 12 hr tablet, Take 2 tablets by mouth 2 (Two) Times a Day As Needed for Cough or Congestion., Disp: , Rfl:     Insulin Pen Needle (Pen Needles) 31G X 6 MM misc, 1 application Daily., Disp: 90 each, Rfl: 3    levothyroxine (SYNTHROID, LEVOTHROID) 50 MCG tablet, Take 1 tablet by mouth Daily., Disp: 90 tablet, Rfl: 1    Liraglutide (VICTOZA) 18 MG/3ML solution pen-injector injection, Inject 1.8 mg under the skin into the appropriate area as directed Daily., Disp: 27 mL, Rfl: 3    losartan-hydrochlorothiazide (HYZAAR) 100-25 MG per tablet, Take 1 tablet by mouth Daily., Disp: 90 tablet, Rfl: 0    Melatonin 10 MG tablet, Take 3 tablets by mouth Every Night., Disp: , Rfl:     metFORMIN (GLUCOPHAGE) 1000 MG tablet, Take 1 tablet by mouth 2 (Two) Times a Day With Meals., Disp: 180 tablet, Rfl: 0    montelukast (SINGULAIR) 10 MG tablet, Take 1 tablet by mouth Daily., Disp: 90 tablet, Rfl: 3    olopatadine (PATANOL) 0.1 % ophthalmic solution, Apply 1 drop to eye(s) as directed by provider Every 12 (Twelve) Hours., Disp: , Rfl:     omeprazole (priLOSEC) 20 MG capsule, Take 1 capsule by mouth As Needed., Disp: , Rfl:     potassium chloride (MICRO-K) 10 MEQ CR capsule, Take 1 capsule by mouth Daily., Disp: 90 capsule, Rfl: 1    traZODone (DESYREL) 100 MG tablet, Take 2 tablets  "by mouth Every Night., Disp: 180 tablet, Rfl: 1    ubrogepant (Ubrelvy) 100 MG tablet, Take once with first symptom of migraine.  May taken again 2 hours later if migraine persists., Disp: 9 tablet, Rfl: 0    Viibryd 40 MG tablet tablet, Take 1 tablet by mouth Daily., Disp: 90 tablet, Rfl: 1    zolpidem (AMBIEN) 5 MG tablet, Take 1 tablet by mouth At Night As Needed for Sleep. Indications: Trouble Sleeping, Disp: 20 tablet, Rfl: 0        Objective   /80   Pulse 89   Temp 96.4 °F (35.8 °C)   Resp 16   Ht 154.9 cm (60.98\")   Wt 83.9 kg (185 lb)   SpO2 98%   BMI 34.98 kg/m²     Physical Exam  Vitals and nursing note reviewed. Exam conducted with a chaperone present (MABEL Valderrama).   Constitutional:       General: She is not in acute distress.     Appearance: Normal appearance. She is obese. She is not diaphoretic.   HENT:      Head: Normocephalic and atraumatic.      Right Ear: Tympanic membrane, ear canal and external ear normal.      Left Ear: Tympanic membrane, ear canal and external ear normal.      Nose: Nose normal.      Mouth/Throat:      Mouth: Mucous membranes are moist.      Pharynx: Oropharynx is clear.   Eyes:      General: No scleral icterus.     Extraocular Movements: Extraocular movements intact.      Conjunctiva/sclera: Conjunctivae normal.      Pupils: Pupils are equal, round, and reactive to light.   Neck:      Thyroid: No thyromegaly.      Vascular: No JVD.      Trachea: Trachea and phonation normal.   Cardiovascular:      Rate and Rhythm: Normal rate and regular rhythm.      Pulses:           Dorsalis pedis pulses are 2+ on the right side and 2+ on the left side.        Posterior tibial pulses are 2+ on the right side and 2+ on the left side.      Heart sounds: Normal heart sounds. No murmur heard.  Pulmonary:      Effort: Pulmonary effort is normal.      Breath sounds: Normal breath sounds.   Chest:      Comments: Defer, mammogram ordered   Abdominal:      General: Abdomen is flat. Bowel " sounds are normal. There is no distension.      Palpations: Abdomen is soft.      Tenderness: There is no abdominal tenderness. There is no guarding.   Genitourinary:     General: Normal vulva.      Vagina: No vaginal discharge or prolapsed vaginal walls.      Cervix: Normal.      Adnexa: Right adnexa normal and left adnexa normal.   Musculoskeletal:         General: Normal range of motion.      Cervical back: Normal range of motion and neck supple.      Right lower leg: No edema.      Left lower leg: No edema.   Feet:      Right foot:      Protective Sensation: 10 sites tested.  10 sites sensed.      Skin integrity: Callus and dry skin present. No ulcer, blister, skin breakdown, erythema or warmth.      Toenail Condition: Right toenails are normal.      Left foot:      Protective Sensation: 10 sites tested.  10 sites sensed.      Skin integrity: Callus and dry skin present. No ulcer, blister, skin breakdown, erythema or warmth.      Toenail Condition: Left toenails are normal.   Lymphadenopathy:      Cervical: No cervical adenopathy.   Skin:     General: Skin is warm and dry.      Coloration: Skin is not jaundiced or pale.      Findings: No rash.   Neurological:      Mental Status: She is alert and oriented to person, place, and time.      Cranial Nerves: No cranial nerve deficit.      Motor: No weakness.      Coordination: Coordination normal.      Gait: Gait normal.   Psychiatric:         Mood and Affect: Mood normal.         Behavior: Behavior normal.          Assessment & Plan      Diagnosis Plan   1. Annual physical exam  CBC (No Diff)    Comprehensive Metabolic Panel    Lipid Panel    TSH Rfx On Abnormal To Free T4    Hemoglobin A1c    Hepatitis C Antibody    LIQUID-BASED PAP SMEAR WITH HPV GENOTYPING REGARDLESS OF INTERPRETATION (JENN,COR,MAD)    Mammo Screening Digital Tomosynthesis Bilateral With CAD      2. Type 2 diabetes mellitus without complication, without long-term current use of insulin  CBC (No  Diff)    Comprehensive Metabolic Panel    Lipid Panel    Hemoglobin A1c  Last A1c stable. Update labs. Follow diabetic diet by decreasing carbohydrates, sugar, and processed foods.   Exercise encouraged as tolerated-- discussed low impact walking 30 minutes/day 5 days/week minimally.   Continue taking all medication as prescribed.   Annual eye exam encouraged  Check feet regularly for calluses or ulcers. Wear protective foot wear/no bare feet   Will update POC based on labs       3. Hypertension associated with diabetes  CBC (No Diff)    Comprehensive Metabolic Panel    Lipid Panel    TSH Rfx On Abnormal To Free T4  Stable. Continue current medications as prescribed. Recommend DASH diet, moderate-intensity exercises 4-5 times/week, medication compliance, and home blood pressure monitoring.       4. Psychophysiologic insomnia  zolpidem (AMBIEN) 5 MG tablet  Patient requesting refill of ambien, pcp originally sent 20 tablets >3 months ago. Patient only takes sparingly as needed. UDS collected. Discussion today regarding addictive nature of controlled substances. A controlled substance agreement was explained in detail and signed, see scanned media. Patient verbally understood the need for visits every 3 months where UDS will be performed and may be called in for a pill count or drug screen at any time. Any failure or discrepancy on JENNIFER will result in immediate discontinuation. JENNIFER obtained and appropriate.      5. Hyperlipidemia, unspecified hyperlipidemia type  Lipid Panel  Low cholesterol diet, exercise regimen, continue statin, update labs       6. Acquired hypothyroidism  TSH Rfx On Abnormal To Free T4  Last TSH stable, update labs, continue medication as prescribed       7. Encounter for hepatitis C screening test for low risk patient  Hepatitis C Antibody      8. Medication monitoring encounter  Compliance Drug Analysis, Ur - Urine, Clean Catch      9. Pap smear for cervical cancer screening  LIQUID-BASED  PAP SMEAR WITH HPV GENOTYPING REGARDLESS OF INTERPRETATION (JENN,COR,MAD)      10. Encounter for screening mammogram for malignant neoplasm of breast  Mammo Screening Digital Tomosynthesis Bilateral With CAD      11. Class 1 obesity due to excess calories with serious comorbidity and body mass index (BMI) of 34.0 to 34.9 in adult  BMI is >= 30 and <35. (Class 1 Obesity). The following options were offered after discussion;: exercise counseling/recommendations and nutrition counseling/recommendations              Counseling  Health Maintenance screenings and vaccinations updated, encouraged  Mammogram; every 2 years   Pap smear; 3-5 years, unless otherwise indicated   Colon cancer screenings; every 10 years, unless otherwise indicated   Encouraged 150 minutes of exercise total per week for heart health   Recommend balanced diet, portion control, limiting excess carbs and sweets, limit caffeine   Dental visits twice per year, yearly eye exams   Vitamin D 2000 IU, Calcium 1200 mg daily  BMI is above average; BMI management plan is completed    4. Return in about 3 months (around 9/15/2023) for Cheyenne Barr, ZEHRA  06/15/2023  13:39 EDT

## 2023-06-16 LAB
ALBUMIN SERPL-MCNC: 4.6 G/DL (ref 3.8–4.9)
ALBUMIN/GLOB SERPL: 1.9 {RATIO} (ref 1.2–2.2)
ALP SERPL-CCNC: 62 IU/L (ref 44–121)
ALT SERPL-CCNC: 17 IU/L (ref 0–32)
AST SERPL-CCNC: 19 IU/L (ref 0–40)
BILIRUB SERPL-MCNC: 0.3 MG/DL (ref 0–1.2)
BUN SERPL-MCNC: 13 MG/DL (ref 6–24)
BUN/CREAT SERPL: 14 (ref 9–23)
CALCIUM SERPL-MCNC: 9.8 MG/DL (ref 8.7–10.2)
CHLORIDE SERPL-SCNC: 96 MMOL/L (ref 96–106)
CHOLEST SERPL-MCNC: 123 MG/DL (ref 100–199)
CO2 SERPL-SCNC: 22 MMOL/L (ref 20–29)
CREAT SERPL-MCNC: 0.91 MG/DL (ref 0.57–1)
EGFRCR SERPLBLD CKD-EPI 2021: 73 ML/MIN/1.73
ERYTHROCYTE [DISTWIDTH] IN BLOOD BY AUTOMATED COUNT: 12.9 % (ref 11.7–15.4)
GLOBULIN SER CALC-MCNC: 2.4 G/DL (ref 1.5–4.5)
GLUCOSE SERPL-MCNC: 92 MG/DL (ref 70–99)
HBA1C MFR BLD: 5.9 % (ref 4.8–5.6)
HCT VFR BLD AUTO: 36.3 % (ref 34–46.6)
HCV IGG SERPL QL IA: NON REACTIVE
HDLC SERPL-MCNC: 71 MG/DL
HGB BLD-MCNC: 12.6 G/DL (ref 11.1–15.9)
LDLC SERPL CALC-MCNC: 34 MG/DL (ref 0–99)
MCH RBC QN AUTO: 29.6 PG (ref 26.6–33)
MCHC RBC AUTO-ENTMCNC: 34.7 G/DL (ref 31.5–35.7)
MCV RBC AUTO: 85 FL (ref 79–97)
PLATELET # BLD AUTO: 310 X10E3/UL (ref 150–450)
POTASSIUM SERPL-SCNC: 4 MMOL/L (ref 3.5–5.2)
PROT SERPL-MCNC: 7 G/DL (ref 6–8.5)
RBC # BLD AUTO: 4.25 X10E6/UL (ref 3.77–5.28)
SODIUM SERPL-SCNC: 138 MMOL/L (ref 134–144)
TRIGL SERPL-MCNC: 101 MG/DL (ref 0–149)
TSH SERPL DL<=0.005 MIU/L-ACNC: 0.65 UIU/ML (ref 0.45–4.5)
VLDLC SERPL CALC-MCNC: 18 MG/DL (ref 5–40)
WBC # BLD AUTO: 8.7 X10E3/UL (ref 3.4–10.8)

## 2023-06-19 LAB — REF LAB TEST METHOD: NORMAL

## 2023-06-19 NOTE — TELEPHONE ENCOUNTER
Rx Refill Note  Requested Prescriptions     Pending Prescriptions Disp Refills    Ubrelvy 100 MG tablet [Pharmacy Med Name: Ubrelvy 100 MG Oral Tablet] 9 tablet 0     Sig: TAKE ONCE WITH FIRST SYMPTOM OF MIGRAINE. MAY TAKE AGAIN 2 HOURS LATER IF MIGRAINE PERSISTS      Last office visit with prescribing clinician: 6/15/2023   Last telemedicine visit with prescribing clinician: 3/1/2023   Next office visit with prescribing clinician: Visit date not found                         Would you like a call back once the refill request has been completed: [] Yes [] No    If the office needs to give you a call back, can they leave a voicemail: [] Yes [] No    Bharti Roca MA  06/19/23, 10:30 EDT

## 2023-06-20 NOTE — PROGRESS NOTES
Pap overall normal. HPV negative. Benign cellular changes associated with atrophy are present. Recommend every 3-5 years.

## 2023-08-10 RX ORDER — VILAZODONE HYDROCHLORIDE 40 MG/1
TABLET ORAL
Qty: 90 TABLET | Refills: 0 | Status: SHIPPED | OUTPATIENT
Start: 2023-08-10 | End: 2023-08-10 | Stop reason: SDUPTHER

## 2023-08-11 RX ORDER — VILAZODONE HYDROCHLORIDE 40 MG/1
40 TABLET ORAL DAILY
Qty: 90 TABLET | Refills: 0 | Status: SHIPPED | OUTPATIENT
Start: 2023-08-11

## 2023-08-11 RX ORDER — VILAZODONE HYDROCHLORIDE 40 MG/1
40 TABLET ORAL DAILY
Qty: 90 TABLET | Refills: 0 | Status: SHIPPED | OUTPATIENT
Start: 2023-08-11 | End: 2023-08-11 | Stop reason: SDUPTHER

## 2023-08-24 NOTE — TELEPHONE ENCOUNTER
Rx Refill Note  Requested Prescriptions     Pending Prescriptions Disp Refills    ubrogepant (Ubrelvy) 100 MG tablet 9 tablet 1     Sig: TAKE 1 TABLET WITH FIRST SYMPTOM OF MIGRAINE. MAY TAKE AGAIN 2 HOURS LATER IF MIGRAINE PERSISTS      Last office visit with prescribing clinician: 6/15/2023   Last telemedicine visit with prescribing clinician: 3/1/2023   Next office visit with prescribing clinician: Visit date not found                         Aylin Vergara MA  08/24/23, 11:57 EDT

## 2023-08-25 ENCOUNTER — TELEPHONE (OUTPATIENT)
Dept: INTERNAL MEDICINE | Facility: CLINIC | Age: 58
End: 2023-08-25
Payer: COMMERCIAL

## 2023-08-25 NOTE — TELEPHONE ENCOUNTER
Caller: [facility]    Relationship: Pharmacy    Best call back number: [phone]    What medications are you currently taking:   Current Outpatient Medications on File Prior to Visit   Medication Sig Dispense Refill    acetaminophen (TYLENOL) 500 MG tablet Take 2 tablets by mouth Every 8 (Eight) Hours x1 week, then take as needed. 42 tablet 0    Advair Diskus 500-50 MCG/ACT DISKUS INHALE 1 DOSE BY MOUTH TWICE DAILY 180 each 0    albuterol (ProAir RespiClick) 108 (90 Base) MCG/ACT inhaler Inhale 2 puffs Every 6 (Six) Hours As Needed.      amLODIPine (NORVASC) 2.5 MG tablet Take 1 tablet by mouth Daily. 90 tablet 3    ARIPiprazole (ABILIFY) 2 MG tablet Take 1 tablet by mouth Daily. 90 tablet 1    atorvastatin (LIPITOR) 40 MG tablet Take 1 tablet by mouth once daily 90 tablet 1    CBD oil (cannabidiol) capsule Take  by mouth.      cetirizine (zyrTEC) 10 MG tablet Take 1 tablet by mouth Daily.      Chlorcyclizine-Pseudoephed (STAHIST AD PO) Take  by mouth.      cycloSPORINE (Restasis) 0.05 % ophthalmic emulsion Apply 1 drop to eye(s) as directed by provider Every 12 (Twelve) Hours.      DULoxetine (CYMBALTA) 60 MG capsule Take 1 capsule by mouth Daily. 90 capsule 3    Fluticasone-Salmeterol (Advair Diskus) 500-50 MCG/ACT DISKUS Inhale 1 puff 2 (Two) Times a Day. 1 each 2    guaiFENesin (MUCINEX) 600 MG 12 hr tablet Take 2 tablets by mouth 2 (Two) Times a Day As Needed for Cough or Congestion.      Insulin Pen Needle (Pen Needles) 31G X 6 MM misc 1 application Daily. 90 each 3    levothyroxine (SYNTHROID, LEVOTHROID) 50 MCG tablet Take 1 tablet by mouth once daily 90 tablet 0    Liraglutide (VICTOZA) 18 MG/3ML solution pen-injector injection Inject 1.8 mg under the skin into the appropriate area as directed Daily. 27 mL 3    losartan-hydrochlorothiazide (HYZAAR) 100-25 MG per tablet Take 1 tablet by mouth Daily. 90 tablet 0    Melatonin 10 MG tablet Take 3 tablets by mouth Every Night.       metFORMIN (GLUCOPHAGE) 1000 MG tablet Take 1 tablet by mouth 2 (Two) Times a Day With Meals. 180 tablet 0    montelukast (SINGULAIR) 10 MG tablet Take 1 tablet by mouth Daily. 90 tablet 3    olopatadine (PATANOL) 0.1 % ophthalmic solution Apply 1 drop to eye(s) as directed by provider Every 12 (Twelve) Hours.      omeprazole (priLOSEC) 20 MG capsule Take 1 capsule by mouth As Needed.      ondansetron (Zofran) 4 MG tablet Take 1 tablet by mouth Every 8 (Eight) Hours As Needed for Nausea or Vomiting. 30 tablet 0    potassium chloride (MICRO-K) 10 MEQ CR capsule Take 1 capsule by mouth Daily. 90 capsule 1    traZODone (DESYREL) 100 MG tablet Take 2 tablets by mouth Every Night. 180 tablet 1    ubrogepant (Ubrelvy) 100 MG tablet TAKE 1 TABLET WITH FIRST SYMPTOM OF MIGRAINE. MAY TAKE AGAIN 2 HOURS LATER IF MIGRAINE PERSISTS 9 tablet 2    Viibryd 40 MG tablet tablet Take 1 tablet by mouth Daily. 90 tablet 0    vilazodone (Viibryd) 40 MG tablet tablet Take 1 tablet by mouth Daily. 90 tablet 0    zolpidem (AMBIEN) 5 MG tablet Take 1 tablet by mouth At Night As Needed for Sleep. Indications: Trouble Sleeping 20 tablet 0     No current facility-administered medications on file prior to visit.        Which medication are you concerned about:     ubrogepant (Ubrelvy) 100 MG tablet     What are your concerns:     WHAT IS THE INTENDED DAY SUPPLY?    THEY HAD TO CHANGE TO 10 SO AS NOT TO BREAK A BOX

## 2023-08-30 ENCOUNTER — PRIOR AUTHORIZATION (OUTPATIENT)
Dept: INTERNAL MEDICINE | Facility: CLINIC | Age: 58
End: 2023-08-30
Payer: COMMERCIAL

## 2023-08-30 NOTE — TELEPHONE ENCOUNTER
jaky marie Key: [id number] help? Call us at [phone]  Status  Sent to [employer]  Drug  Ubrelvy 100MG tablets  Form  [facility] Electronic PA Form (CB)

## 2023-09-07 NOTE — TELEPHONE ENCOUNTER
jaky marie Key: [id number] - PA Case ID: [id number] help? Call us at [phone]  Outcome  Approvedon September 6  PA Case: [id number], Status: Approved, Coverage Starts on: 9/6/2023 12:00 AM, Coverage Ends on: 9/6/2024 12:00 AM. Questions? Contact [phone].  Drug  Ubrelvy 100MG tablets  Form  [employer] Electronic Prior Authorization Form (2017 [place])

## 2023-09-15 ENCOUNTER — OFFICE VISIT (OUTPATIENT)
Dept: INTERNAL MEDICINE | Facility: CLINIC | Age: 58
End: 2023-09-15
Payer: COMMERCIAL

## 2023-09-15 VITALS
HEIGHT: 61 IN | WEIGHT: 182 LBS | SYSTOLIC BLOOD PRESSURE: 102 MMHG | BODY MASS INDEX: 34.36 KG/M2 | DIASTOLIC BLOOD PRESSURE: 72 MMHG | OXYGEN SATURATION: 98 % | TEMPERATURE: 98.2 F | HEART RATE: 87 BPM

## 2023-09-15 DIAGNOSIS — E11.9 TYPE 2 DIABETES MELLITUS WITHOUT COMPLICATION, WITHOUT LONG-TERM CURRENT USE OF INSULIN: ICD-10-CM

## 2023-09-15 DIAGNOSIS — I15.2 HYPERTENSION ASSOCIATED WITH DIABETES: ICD-10-CM

## 2023-09-15 DIAGNOSIS — E11.59 HYPERTENSION ASSOCIATED WITH DIABETES: ICD-10-CM

## 2023-09-15 DIAGNOSIS — R14.3 FLATULENCE: ICD-10-CM

## 2023-09-15 DIAGNOSIS — L81.9 CHANGE IN PIGMENTED SKIN LESION: ICD-10-CM

## 2023-09-15 DIAGNOSIS — J45.41 MODERATE PERSISTENT ASTHMA WITH EXACERBATION: Primary | ICD-10-CM

## 2023-09-15 DIAGNOSIS — F51.04 PSYCHOPHYSIOLOGIC INSOMNIA: ICD-10-CM

## 2023-09-15 DIAGNOSIS — L91.8 SKIN TAG, ACQUIRED: ICD-10-CM

## 2023-09-15 DIAGNOSIS — E03.9 ACQUIRED HYPOTHYROIDISM: ICD-10-CM

## 2023-09-15 DIAGNOSIS — A04.8 H. PYLORI INFECTION: ICD-10-CM

## 2023-09-15 LAB
POC CREATININE URINE: 300
POC MICROALBUMIN URINE: 80

## 2023-09-15 RX ORDER — LEVOTHYROXINE SODIUM 0.05 MG/1
50 TABLET ORAL DAILY
Qty: 90 TABLET | Refills: 1 | Status: SHIPPED | OUTPATIENT
Start: 2023-09-15

## 2023-09-15 RX ORDER — METHYLPREDNISOLONE 4 MG/1
4 TABLET ORAL DAILY
Qty: 10 TABLET | Refills: 0 | Status: SHIPPED | OUTPATIENT
Start: 2023-09-15 | End: 2023-09-25

## 2023-09-15 RX ORDER — FLUTICASONE PROPIONATE AND SALMETEROL 50; 500 UG/1; UG/1
1 POWDER RESPIRATORY (INHALATION) 2 TIMES DAILY
Qty: 180 EACH | Refills: 3 | Status: SHIPPED | OUTPATIENT
Start: 2023-09-15

## 2023-09-15 RX ORDER — ZOLPIDEM TARTRATE 5 MG/1
5 TABLET ORAL NIGHTLY PRN
Qty: 20 TABLET | Refills: 0 | Status: SHIPPED | OUTPATIENT
Start: 2023-09-15

## 2023-09-15 RX ORDER — LOSARTAN POTASSIUM AND HYDROCHLOROTHIAZIDE 25; 100 MG/1; MG/1
1 TABLET ORAL DAILY
Qty: 90 TABLET | Refills: 0 | Status: SHIPPED | OUTPATIENT
Start: 2023-09-15

## 2023-09-15 NOTE — PROGRESS NOTES
Office Visit      Patient Name: Robina Dumont  : 1965   MRN: [id number]     Chief Complaint:    Chief Complaint   Patient presents with   • Diabetes   • Hypertension   • Insomnia       History of Present Illness: Robina Dumont is a 58 y.o. female who is here today for follow up of HTN T2DM, insomnia.     Diagnosed with  & treated for strep less than 2 weeks ago.  Has had a phlegmy cough, doesn't feel well. Has had to use albuterol inhaler this week, something she typically does not have to do.  No fever, chills, excessive fatigue, myalgia.        HTN: taking medications as prescribed.  Denies chest pain, dyspnea, orthopnea, palpitations, lower extremity edema, confusion, headaches, weakness, visual disturbances.    T2DM: taking medication as prescribed. Last A1c 5.9 in .  Tries to eat a healthy diet with occasional sweets.  Patient denies foot ulcerations, hyperglycemia, hypoglycemia, nausea, paresthesia of the feet, polydipsia, polyuria, polyphagia, visual disturbances and weight loss.     Insomnia:  taking CBD gummies, trazodone (1 tablet) HS.  If she wakes in the middle of the night, she takes melatonin and it works well.      She has noticed excessive gas recently that has a particularly foul odor.  This is problematic and understandably embarrassing at work.  No unexplained weight gain, abdominal bloat, belching, pelvic pressure reported.  She does take omeprazole occasionally, last dose within 15 days.      Hypothyroidism: taking levothyroxine.  Labs normal 3 months ago.  Denies abnormal fatigue, weight change, temperature intolerance, hair/skin/nail changes, bowel habit changes, palpitations, anxiety, sore throat, hoarseness.     Requesting referral to Derm.  She has several skin tags and one lesion that has been present for several month.  Does not itch, occasionally flakes.      Subjective      I have reviewed and the following portions of the patient's history were  updated as appropriate: past family history, past medical history, past social history, past surgical history and problem list.      Current Outpatient Medications:   •  Advair Diskus 500-50 MCG/ACT DISKUS, Inhale 1 puff by mouth 2 (Two) Times a Day., Disp: 180 each, Rfl: 3  •  levothyroxine (SYNTHROID, LEVOTHROID) 50 MCG tablet, Take 1 tablet by mouth Daily., Disp: 90 tablet, Rfl: 1  •  Liraglutide (VICTOZA) 18 MG/3ML solution pen-injector injection, Inject 1.8 mg under the skin into the appropriate area as directed Daily., Disp: 27 mL, Rfl: 3  •  losartan-hydrochlorothiazide (HYZAAR) 100-25 MG per tablet, Take 1 tablet by mouth Daily., Disp: 90 tablet, Rfl: 0  •  ubrogepant (Ubrelvy) 100 MG tablet, TAKE 1 TABLET WITH FIRST SYMPTOM OF MIGRAINE. MAY TAKE AGAIN 2 HOURS LATER IF MIGRAINE PERSISTS, Disp: 10 tablet, Rfl: 2  •  zolpidem (AMBIEN) 5 MG tablet, Take 1 tablet by mouth At Night As Needed for Sleep, Disp: 20 tablet, Rfl: 0  •  acetaminophen (TYLENOL) 500 MG tablet, Take 2 tablets by mouth Every 8 (Eight) Hours x1 week, then take as needed., Disp: 42 tablet, Rfl: 0  •  albuterol (ProAir RespiClick) 108 (90 Base) MCG/ACT inhaler, Inhale 2 puffs Every 6 (Six) Hours As Needed., Disp: , Rfl:   •  amLODIPine (NORVASC) 2.5 MG tablet, Take 1 tablet by mouth Daily., Disp: 90 tablet, Rfl: 3  •  ARIPiprazole (ABILIFY) 2 MG tablet, Take 1 tablet by mouth Daily., Disp: 90 tablet, Rfl: 1  •  atorvastatin (LIPITOR) 40 MG tablet, Take 1 tablet by mouth once daily, Disp: 90 tablet, Rfl: 1  •  bismuth subsalicylate (PEPTO BISMOL) 262 MG/15ML suspension, Take 15 mL by mouth Every 6 (Six) Hours for 10 days., Disp: 600 mL, Rfl: 0  •  CBD oil (cannabidiol) capsule, Take  by mouth., Disp: , Rfl:   •  cetirizine (zyrTEC) 10 MG tablet, Take 1 tablet by mouth Daily., Disp: , Rfl:   •  Chlorcyclizine-Pseudoephed (STAHIST AD PO), Take  by mouth., Disp: , Rfl:   •  ciprofloxacin (Cipro) 500 MG tablet, Take 1 tablet by mouth 2 (Two) Times  a Day for 10 days., Disp: 20 tablet, Rfl: 0  •  cycloSPORINE (Restasis) 0.05 % ophthalmic emulsion, Apply 1 drop to eye(s) as directed by provider Every 12 (Twelve) Hours., Disp: , Rfl:   •  DULoxetine (CYMBALTA) 60 MG capsule, Take 1 capsule by mouth Daily., Disp: 90 capsule, Rfl: 3  •  Fluticasone-Salmeterol (Advair Diskus) 500-50 MCG/ACT DISKUS, Inhale 1 puff 2 (Two) Times a Day., Disp: 1 each, Rfl: 2  •  guaiFENesin (MUCINEX) 600 MG 12 hr tablet, Take 2 tablets by mouth 2 (Two) Times a Day As Needed for Cough or Congestion., Disp: , Rfl:   •  Insulin Pen Needle (Pen Needles) 31G X 6 MM misc, 1 application Daily., Disp: 90 each, Rfl: 3  •  Melatonin 10 MG tablet, Take 3 tablets by mouth Every Night., Disp: , Rfl:   •  metFORMIN (GLUCOPHAGE) 1000 MG tablet, Take 1 tablet by mouth 2 (Two) Times a Day With Meals., Disp: 180 tablet, Rfl: 0  •  metroNIDAZOLE (Flagyl) 500 MG tablet, Take 1 tablet by mouth 3 (Three) Times a Day for 10 days., Disp: 30 tablet, Rfl: 0  •  montelukast (SINGULAIR) 10 MG tablet, Take 1 tablet by mouth Daily., Disp: 90 tablet, Rfl: 3  •  olopatadine (PATANOL) 0.1 % ophthalmic solution, Apply 1 drop to eye(s) as directed by provider Every 12 (Twelve) Hours., Disp: , Rfl:   •  omeprazole (priLOSEC) 20 MG capsule, Take 1 capsule by mouth As Needed., Disp: , Rfl:   •  omeprazole (priLOSEC) 40 MG capsule, Take 1 capsule by mouth 2 (Two) Times a Day for 10 days., Disp: 20 capsule, Rfl: 0  •  ondansetron (Zofran) 4 MG tablet, Take 1 tablet by mouth Every 8 (Eight) Hours As Needed for Nausea or Vomiting., Disp: 30 tablet, Rfl: 0  •  potassium chloride (MICRO-K) 10 MEQ CR capsule, Take 1 capsule by mouth Daily., Disp: 90 capsule, Rfl: 1  •  traZODone (DESYREL) 100 MG tablet, Take 2 tablets by mouth Every Night., Disp: 180 tablet, Rfl: 1  •  Viibryd 40 MG tablet tablet, Take 1 tablet by mouth Daily., Disp: 90 tablet, Rfl: 0  •  vilazodone (Viibryd) 40 MG tablet tablet, Take 1 tablet by mouth Daily.,  "Disp: 90 tablet, Rfl: 0    Allergies   Allergen Reactions   • Erythromycin GI Intolerance and Nausea Only   • Sudanyl [Pseudoephedrine] Other (See Comments)     Facial swelling    • Prednisone Other (See Comments)     Hypertension         Objective     Physical Exam:  Vital Signs:   Vitals:    09/15/23 0758   BP: 102/72   Pulse: 87   Temp: 98.2 °F (36.8 °C)   SpO2: 98%   Weight: 82.6 kg (182 lb)   Height: 154.9 cm (60.98\")     Body mass index is 34.41 kg/m².    Physical Exam  Constitutional:       Appearance: She is obese. She is not ill-appearing.   HENT:      Head: Normocephalic.      Right Ear: External ear normal.      Left Ear: External ear normal.   Eyes:      Conjunctiva/sclera: Conjunctivae normal.      Pupils: Pupils are equal, round, and reactive to light.   Cardiovascular:      Rate and Rhythm: Normal rate and regular rhythm.      Pulses:           Radial pulses are 2+ on the right side and 2+ on the left side.        Dorsalis pedis pulses are 2+ on the right side and 2+ on the left side.      Heart sounds: Normal heart sounds.   Pulmonary:      Effort: Pulmonary effort is normal.      Breath sounds: Wheezing and rhonchi present.   Musculoskeletal:      Cervical back: Normal range of motion and neck supple.      Right lower leg: No edema.      Left lower leg: No edema.   Skin:     General: Skin is warm.      Capillary Refill: Capillary refill takes less than 2 seconds.      Comments: Skin tag between breasts, erythematous due to irritation from her bra.   Hypopigmented lesion on right shin, firm, raised, irregular borders.     Neurological:      Mental Status: She is alert and oriented to person, place, and time.      Coordination: Coordination normal.      Gait: Gait normal.   Psychiatric:         Mood and Affect: Mood normal.         Behavior: Behavior normal.         Thought Content: Thought content normal.     Lab Results   Component Value Date    HGBA1C 5.9 (H) 06/15/2023    HGBA1C 6.10 (H) " 12/05/2022    HGBA1C 6.60 (H) 06/10/2021         Assessment / Plan      Assessment/Plan:   Diagnoses and all orders for this visit:    1. Moderate persistent asthma with exacerbation (Primary)  -     methylPREDNISolone (MEDROL) 4 MG tablet; Take 1 tablet by mouth Daily for 10 days.  Dispense: 10 tablet; Refill: 0.  She is allergic to prednisone, has taken medrol in the past.  Will monitor for elevated blood pressure, which is what happens when she takes prednisone.   - Continue albuterol as needed for SOA, wheezing.   -     Advair Diskus 500-50 MCG/ACT DISKUS; Inhale 1 puff by mouth 2 (Two) Times a Day.  Dispense: 180 each; Refill: 3    2. Type 2 diabetes mellitus without complication, without long-term current use of insulin  -     POCT microalbumin  -     Liraglutide (VICTOZA) 18 MG/3ML solution pen-injector injection; Inject 1.8 mg under the skin into the appropriate area as directed Daily.  Dispense: 27 mL; Refill: 3        - Follow diabetic diet        - See eye doctor annually or as discussed        - Wear protective foot wear/no bare feet        - Check feet regularly for calluses or ulcers        - Discussed risk of poorly controlled diabetes and long-term complications        - Exercise as tolerated for 30-45 minutes most days of the week. Gradually increase daily exercise if not currently active.        - Take all medications as prescribed    3. Hypertension associated with diabetes        - Follow heart healthy diet.  Keep sodium intake < 1500 mg per day.  Avoid processed & fast foods.          - Exercise as tolerated, with a goal of 30 minutes of moderate exercise most days.         - Take medications as prescribed.        -     losartan-hydrochlorothiazide (HYZAAR) 100-25 MG per tablet; Take 1 tablet by mouth Daily.  Dispense: 90 tablet; Refill: 0    4. Psychophysiologic insomnia  -     zolpidem (AMBIEN) 5 MG tablet; Take 1 tablet by mouth At Night As Needed for Sleep  Dispense: 20 tablet; Refill:  0  - Continue CBD gummy, trazodone and melatonin.      5. Acquired hypothyroidism  -     levothyroxine (SYNTHROID, LEVOTHROID) 50 MCG tablet; Take 1 tablet by mouth Daily.  Dispense: 90 tablet; Refill: 1    6. Skin tag, acquired  -     Ambulatory Referral to Dermatology    7. Change in pigmented skin lesion  -     Ambulatory Referral to Dermatology      Follow Up:   Return in about 3 months (around 12/15/2023) for Next scheduled follow up.    Patient was given instructions and counseling regarding her condition or for health maintenance advice. Please see specific information pulled into the AVS if appropriate.       [facility]     Please note that portions of this note may have been completed with a voice recognition program. Efforts were made to edit dictation, but occasionally words are mistranscribed.

## 2023-09-17 LAB — H PYLORI AG STL QL IA: POSITIVE

## 2023-09-18 RX ORDER — OMEPRAZOLE 40 MG/1
40 CAPSULE, DELAYED RELEASE ORAL 2 TIMES DAILY
Qty: 20 CAPSULE | Refills: 0 | Status: SHIPPED | OUTPATIENT
Start: 2023-09-18 | End: 2023-09-28

## 2023-09-18 RX ORDER — CIPROFLOXACIN 500 MG/1
500 TABLET, FILM COATED ORAL 2 TIMES DAILY
Qty: 20 TABLET | Refills: 0 | Status: SHIPPED | OUTPATIENT
Start: 2023-09-18 | End: 2023-09-28

## 2023-09-18 RX ORDER — METRONIDAZOLE 500 MG/1
500 TABLET ORAL 3 TIMES DAILY
Qty: 30 TABLET | Refills: 0 | Status: SHIPPED | OUTPATIENT
Start: 2023-09-18 | End: 2023-09-28

## 2023-09-20 ENCOUNTER — TELEPHONE (OUTPATIENT)
Dept: INTERNAL MEDICINE | Facility: CLINIC | Age: 58
End: 2023-09-20
Payer: COMMERCIAL

## 2023-09-27 ENCOUNTER — PRIOR AUTHORIZATION (OUTPATIENT)
Dept: INTERNAL MEDICINE | Facility: CLINIC | Age: 58
End: 2023-09-27
Payer: COMMERCIAL

## 2023-09-29 NOTE — TELEPHONE ENCOUNTER
[employer] message sent to patient asking her to contact insurance.  Will prescribe different med if insurance continues to deny it - she's been taking it for quite some time with good glucose control.

## 2023-10-03 ENCOUNTER — PATIENT MESSAGE (OUTPATIENT)
Dept: INTERNAL MEDICINE | Facility: CLINIC | Age: 58
End: 2023-10-03
Payer: COMMERCIAL

## 2023-10-03 DIAGNOSIS — T36.95XA ANTIBIOTIC-ASSOCIATED DIARRHEA: Primary | ICD-10-CM

## 2023-10-03 DIAGNOSIS — K52.1 ANTIBIOTIC-ASSOCIATED DIARRHEA: Primary | ICD-10-CM

## 2023-10-03 NOTE — TELEPHONE ENCOUNTER
From: Robina Dumont  To: Lisandra Mathew  Sent: 10/3/2023 11:35 AM EDT  Subject: Diarrhea    I still have diarrhea is there anything stronger than Imodium? I can’t keep this up it’s affecting my work. I had to come home today and shower because I had an accident as soon as I got to work.

## 2023-10-11 DIAGNOSIS — E03.9 ACQUIRED HYPOTHYROIDISM: ICD-10-CM

## 2023-10-11 RX ORDER — LEVOTHYROXINE SODIUM 0.05 MG/1
50 TABLET ORAL DAILY
Qty: 90 TABLET | Refills: 0 | Status: SHIPPED | OUTPATIENT
Start: 2023-10-11

## 2023-10-17 RX ORDER — ATORVASTATIN CALCIUM 40 MG/1
TABLET, FILM COATED ORAL
Qty: 90 TABLET | Refills: 0 | Status: SHIPPED | OUTPATIENT
Start: 2023-10-17

## 2023-10-27 ENCOUNTER — HOSPITAL ENCOUNTER (OUTPATIENT)
Dept: GENERAL RADIOLOGY | Facility: HOSPITAL | Age: 58
Discharge: HOME | End: 2023-10-27
Admitting: NURSE PRACTITIONER
Payer: COMMERCIAL

## 2023-10-27 ENCOUNTER — OFFICE VISIT (OUTPATIENT)
Dept: INTERNAL MEDICINE | Facility: CLINIC | Age: 58
End: 2023-10-27
Payer: COMMERCIAL

## 2023-10-27 VITALS
HEIGHT: 61 IN | SYSTOLIC BLOOD PRESSURE: 118 MMHG | HEART RATE: 83 BPM | WEIGHT: 185 LBS | OXYGEN SATURATION: 98 % | DIASTOLIC BLOOD PRESSURE: 64 MMHG | TEMPERATURE: 97.8 F | BODY MASS INDEX: 34.93 KG/M2

## 2023-10-27 DIAGNOSIS — Z23 NEED FOR INFLUENZA VACCINATION: ICD-10-CM

## 2023-10-27 DIAGNOSIS — F51.04 PSYCHOPHYSIOLOGIC INSOMNIA: ICD-10-CM

## 2023-10-27 DIAGNOSIS — S46.912A ELBOW STRAIN, LEFT, INITIAL ENCOUNTER: Primary | ICD-10-CM

## 2023-10-27 PROCEDURE — 73080 X-RAY EXAM OF ELBOW: CPT

## 2023-10-27 RX ORDER — ZOLPIDEM TARTRATE 5 MG/1
5 TABLET ORAL NIGHTLY PRN
Qty: 20 TABLET | Refills: 0 | Status: SHIPPED | OUTPATIENT
Start: 2023-10-27

## 2023-10-27 RX ORDER — METHYLPREDNISOLONE 4 MG/1
4 TABLET ORAL DAILY
Qty: 10 TABLET | Refills: 0 | Status: SHIPPED | OUTPATIENT
Start: 2023-10-27 | End: 2023-11-06

## 2023-10-27 RX ORDER — METHYLPREDNISOLONE 4 MG/1
4 TABLET ORAL DAILY
Qty: 10 TABLET | Refills: 0 | Status: SHIPPED | OUTPATIENT
Start: 2023-10-27 | End: 2023-10-27

## 2023-10-27 NOTE — TELEPHONE ENCOUNTER
UDS & CSA: 6/15/23      Rx Refill Note  Requested Prescriptions     Pending Prescriptions Disp Refills    zolpidem (AMBIEN) 5 MG tablet 20 tablet 0     Sig: Take 1 tablet by mouth At Night As Needed for Sleep      Last office visit with prescribing clinician: 9/15/2023   Last telemedicine visit with prescribing clinician: Visit date not found   Next office visit with prescribing clinician: 12/22/2023                         Aylin Vergara MA  10/27/23, 09:20 EDT

## 2023-10-27 NOTE — PROGRESS NOTES
There is mild arthritis noted at ulnohumeral joint, otherwise no other abnormality identified. Let us know if not better after treatment would consider PT then ortho if not better

## 2023-10-27 NOTE — PROGRESS NOTES
Acute Office Visit      Date: 10/27/2023   Patient Name: Robina Dumont  : 1965   MRN: [id number]     Chief Complaint:    Chief Complaint   Patient presents with    Elbow Injury     X couple months       History of Present Illness: Robina Dumont is a 58 y.o. female presents with elbow injury. Put weight on her left elbow and turned around, felt pop and felt like unable to straighten elbow out since. No erythema, warmth, swelling to site. Painful when tries to straighten or bend to wash her hair. Never injured it before. Pain refers up and down arm at times. Not been taking anything for pain other than tylenol. States cannot take nsaids due to hx gastric bypass. Allergic to prednisone but has take medrol in the past without side effects.       Subjective      Review of Systems:   Pertinent ROS noted in HPI.     I have reviewed the patients family history, social history, past medical history, past surgical history and have updated it as appropriate.     Medications:     Current Outpatient Medications:     methylPREDNISolone (Medrol) 4 MG tablet, Take 1 tablet by mouth Daily for 10 days., Disp: 10 tablet, Rfl: 0    acetaminophen (TYLENOL) 500 MG tablet, Take 2 tablets by mouth Every 8 (Eight) Hours x1 week, then take as needed., Disp: 42 tablet, Rfl: 0    Advair Diskus 500-50 MCG/ACT DISKUS, Inhale 1 puff by mouth 2 (Two) Times a Day., Disp: 180 each, Rfl: 3    albuterol (ProAir RespiClick) 108 (90 Base) MCG/ACT inhaler, Inhale 2 puffs Every 6 (Six) Hours As Needed., Disp: , Rfl:     amLODIPine (NORVASC) 2.5 MG tablet, Take 1 tablet by mouth Daily., Disp: 90 tablet, Rfl: 3    ARIPiprazole (ABILIFY) 2 MG tablet, Take 1 tablet by mouth Daily., Disp: 90 tablet, Rfl: 1    atorvastatin (LIPITOR) 40 MG tablet, Take 1 tablet by mouth once daily, Disp: 90 tablet, Rfl: 0    CBD oil (cannabidiol) capsule, Take  by mouth., Disp: , Rfl:     cetirizine (zyrTEC) 10 MG tablet, Take 1 tablet by mouth Daily.,  Disp: , Rfl:     Chlorcyclizine-Pseudoephed (STAHIST AD PO), Take  by mouth., Disp: , Rfl:     cholestyramine light 4 g powder, Dissolve 1 scoop of powder and drink by mouth 2 (Two) Times a Day As Needed for diarrhea, Disp: 231 g, Rfl: 0    cycloSPORINE (Restasis) 0.05 % ophthalmic emulsion, Apply 1 drop to eye(s) as directed by provider Every 12 (Twelve) Hours., Disp: , Rfl:     DULoxetine (CYMBALTA) 60 MG capsule, Take 1 capsule by mouth Daily., Disp: 90 capsule, Rfl: 3    Fluticasone-Salmeterol (Advair Diskus) 500-50 MCG/ACT DISKUS, Inhale 1 puff 2 (Two) Times a Day., Disp: 1 each, Rfl: 2    guaiFENesin (MUCINEX) 600 MG 12 hr tablet, Take 2 tablets by mouth 2 (Two) Times a Day As Needed for Cough or Congestion., Disp: , Rfl:     Insulin Pen Needle (Pen Needles) 31G X 6 MM misc, 1 application Daily., Disp: 90 each, Rfl: 3    levothyroxine (SYNTHROID, LEVOTHROID) 50 MCG tablet, Take 1 tablet by mouth once daily, Disp: 90 tablet, Rfl: 0    Liraglutide (VICTOZA) 18 MG/3ML solution pen-injector injection, Inject 1.8 mg under the skin into the appropriate area as directed Daily., Disp: 27 mL, Rfl: 3    losartan-hydrochlorothiazide (HYZAAR) 100-25 MG per tablet, Take 1 tablet by mouth Daily., Disp: 90 tablet, Rfl: 0    Melatonin 10 MG tablet, Take 3 tablets by mouth Every Night., Disp: , Rfl:     metFORMIN (GLUCOPHAGE) 1000 MG tablet, Take 1 tablet by mouth 2 (Two) Times a Day With Meals., Disp: 180 tablet, Rfl: 0    montelukast (SINGULAIR) 10 MG tablet, Take 1 tablet by mouth Daily., Disp: 90 tablet, Rfl: 3    olopatadine (PATANOL) 0.1 % ophthalmic solution, Apply 1 drop to eye(s) as directed by provider Every 12 (Twelve) Hours., Disp: , Rfl:     omeprazole (priLOSEC) 20 MG capsule, Take 1 capsule by mouth As Needed., Disp: , Rfl:     ondansetron (Zofran) 4 MG tablet, Take 1 tablet by mouth Every 8 (Eight) Hours As Needed for Nausea or Vomiting., Disp: 30 tablet, Rfl: 0    potassium chloride (MICRO-K) 10 MEQ CR  "capsule, Take 1 capsule by mouth Daily., Disp: 90 capsule, Rfl: 1    traZODone (DESYREL) 100 MG tablet, Take 2 tablets by mouth Every Night., Disp: 180 tablet, Rfl: 1    ubrogepant (Ubrelvy) 100 MG tablet, TAKE 1 TABLET WITH FIRST SYMPTOM OF MIGRAINE. MAY TAKE AGAIN 2 HOURS LATER IF MIGRAINE PERSISTS, Disp: 10 tablet, Rfl: 2    Viibryd 40 MG tablet tablet, Take 1 tablet by mouth Daily., Disp: 90 tablet, Rfl: 0    vilazodone (Viibryd) 40 MG tablet tablet, Take 1 tablet by mouth Daily., Disp: 90 tablet, Rfl: 0    zolpidem (AMBIEN) 5 MG tablet, Take 1 tablet by mouth At Night As Needed for Sleep, Disp: 20 tablet, Rfl: 0    Allergies:   Allergies   Allergen Reactions    Erythromycin GI Intolerance and Nausea Only    Sudanyl [Pseudoephedrine] Other (See Comments)     Facial swelling     Prednisone Other (See Comments)     Hypertension         Objective     Physical Exam  Physical Exam  Vitals and nursing note reviewed.   Constitutional:       General: She is not in acute distress.     Appearance: Normal appearance. She is obese.   HENT:      Right Ear: External ear normal.      Left Ear: External ear normal.   Eyes:      Extraocular Movements: Extraocular movements intact.   Cardiovascular:      Rate and Rhythm: Normal rate.   Pulmonary:      Effort: Pulmonary effort is normal.   Musculoskeletal:      Left elbow: No swelling, deformity, effusion or lacerations. Decreased range of motion. No tenderness.      Cervical back: Normal range of motion.   Skin:     General: Skin is dry.   Neurological:      Mental Status: She is alert and oriented to person, place, and time.   Psychiatric:         Mood and Affect: Mood normal.         Vital Signs:   Vitals:    10/27/23 0815   BP: 118/64   Pulse: 83   Temp: 97.8 °F (36.6 °C)   SpO2: 98%   Weight: 83.9 kg (185 lb)   Height: 154.9 cm (60.98\")     Body mass index is 34.98 kg/m².        Assessment / Plan      Assessment/Plan:   Problem List Items Addressed This Visit    None  Visit " Diagnoses       Elbow strain, left, initial encounter    -  Primary    Relevant Medications    methylPREDNISolone (Medrol) 4 MG tablet    Other Relevant Orders    XR elbow 3+ vw left    Need for influenza vaccination        Relevant Orders    Fluzone >6 Months ([id number]) (Completed)            Suspect tendon/ligament strain, will do xr to ensure no abnormality due to hearing pop and trouble straightening it   Steroid burst for decrease inflammation, cool/warm compresses to site, ROM exercises, no lifting with that arm  Will send to ortho if persists/worsens     We discussed the risks and benefits of Influenza.    Counseling was given to inform of the potential signs and symptoms of adverse effects and when to seek medical attention.    The CDC Vaccination Information Sheet (VIS) was given for review prior to administration.  A copy of the VIS was also offered.        Follow Up:   Return if symptoms worsen or fail to improve.        Yoselin SHAHID  [facility] [place]

## 2023-11-08 DIAGNOSIS — F51.04 PSYCHOPHYSIOLOGIC INSOMNIA: ICD-10-CM

## 2023-11-08 RX ORDER — TRAZODONE HYDROCHLORIDE 100 MG/1
200 TABLET ORAL NIGHTLY
Qty: 180 TABLET | Refills: 1 | Status: SHIPPED | OUTPATIENT
Start: 2023-11-08

## 2023-11-09 ENCOUNTER — OFFICE VISIT (OUTPATIENT)
Dept: INTERNAL MEDICINE | Facility: CLINIC | Age: 58
End: 2023-11-09
Payer: COMMERCIAL

## 2023-11-09 VITALS
HEART RATE: 87 BPM | HEIGHT: 61 IN | DIASTOLIC BLOOD PRESSURE: 82 MMHG | SYSTOLIC BLOOD PRESSURE: 122 MMHG | OXYGEN SATURATION: 98 % | BODY MASS INDEX: 34.93 KG/M2 | WEIGHT: 185 LBS | TEMPERATURE: 97 F

## 2023-11-09 DIAGNOSIS — J01.40 ACUTE NON-RECURRENT PANSINUSITIS: Primary | ICD-10-CM

## 2023-11-09 LAB
EXPIRATION DATE: NORMAL
FLUAV AG UPPER RESP QL IA.RAPID: NOT DETECTED
FLUBV AG UPPER RESP QL IA.RAPID: NOT DETECTED
INTERNAL CONTROL: NORMAL
Lab: NORMAL
SARS-COV-2 AG UPPER RESP QL IA.RAPID: NOT DETECTED

## 2023-11-09 PROCEDURE — 87428 SARSCOV & INF VIR A&B AG IA: CPT | Performed by: NURSE PRACTITIONER

## 2023-11-09 PROCEDURE — 99213 OFFICE O/P EST LOW 20 MIN: CPT | Performed by: NURSE PRACTITIONER

## 2023-11-09 RX ORDER — AMOXICILLIN AND CLAVULANATE POTASSIUM 875; 125 MG/1; MG/1
1 TABLET, FILM COATED ORAL 2 TIMES DAILY
Qty: 20 TABLET | Refills: 0 | Status: SHIPPED | OUTPATIENT
Start: 2023-11-09 | End: 2023-11-19

## 2023-11-09 NOTE — PROGRESS NOTES
"  Office Visit      Patient Name: Robina Dumont  : 1965   MRN: [id number]   Care Team: Patient Care Team:  Lisandra Mathew APRN as PCP - General (Family Medicine)    Chief Complaint  Headache, sneezing, cough, Back Pain, and Chills    Subjective     Subjective      Robina Dumont presents for sinus congestion/pain.   Onset: since Tuesday   Endorses headache, sneezing, productive cough (yellow mucus), back pain, chills, dental pain, severe sinus pain   Denies fever, nausea, vomiting, diarrhea, chest pain, SOA, wheezing   No sick contacts     Objective     Objective   Vital Signs:   /82   Pulse 87   Temp 97 °F (36.1 °C)   Ht 154.9 cm (60.98\")   Wt 83.9 kg (185 lb)   SpO2 98%   BMI 34.98 kg/m²     Physical Exam  Vitals and nursing note reviewed.   Constitutional:       General: She is not in acute distress.     Appearance: Normal appearance.   HENT:      Head: Normocephalic.      Right Ear: Tympanic membrane, ear canal and external ear normal.      Left Ear: Tympanic membrane, ear canal and external ear normal.      Nose: Nose normal. Congestion present.      Right Sinus: Maxillary sinus tenderness and frontal sinus tenderness present.      Left Sinus: Maxillary sinus tenderness and frontal sinus tenderness present.      Mouth/Throat:      Lips: Pink.      Mouth: Mucous membranes are moist.      Pharynx: Oropharynx is clear.   Eyes:      General: No scleral icterus.        Right eye: No discharge.         Left eye: No discharge.      Extraocular Movements: Extraocular movements intact.      Conjunctiva/sclera: Conjunctivae normal.      Pupils: Pupils are equal, round, and reactive to light.   Cardiovascular:      Rate and Rhythm: Normal rate and regular rhythm.      Heart sounds: Normal heart sounds.   Pulmonary:      Effort: Pulmonary effort is normal.      Breath sounds: Normal breath sounds.   Abdominal:      General: Bowel sounds are normal.      Tenderness: There is no " abdominal tenderness.   Musculoskeletal:         General: Normal range of motion.      Cervical back: Normal range of motion and neck supple.   Lymphadenopathy:      Cervical: No cervical adenopathy.   Skin:     General: Skin is warm and dry.      Capillary Refill: Capillary refill takes less than 2 seconds.   Neurological:      General: No focal deficit present.      Mental Status: She is alert and oriented to person, place, and time.   Psychiatric:         Mood and Affect: Mood normal.         Behavior: Behavior normal.         Thought Content: Thought content normal.          Labs:   Results for orders placed or performed in visit on 11/09/23   POCT SARS-CoV-2 Antigen MARIANNA + Flu    Specimen: Swab   Result Value Ref Range    SARS Antigen Not Detected Not Detected, Presumptive Negative    Influenza A Antigen MARIANNA Not Detected Not Detected    Influenza B Antigen MARIANNA Not Detected Not Detected    Internal Control Passed Passed    Lot Number [id number]     Expiration Date 3/5/24      Assessment / Plan      Assessment & Plan   Problem List Items Addressed This Visit    None  Visit Diagnoses       Acute non-recurrent pansinusitis    -  Primary    Relevant Medications    amoxicillin-clavulanate (AUGMENTIN) 875-125 MG per tablet    Other Relevant Orders    POCT SARS-CoV-2 Antigen MARIANNA + Flu (Completed)          Covid/flu negative   Augmentin prescribed  Saline rinses, warm compresses to sinuses      Follow Up   Return if symptoms worsen or fail to improve.  Patient was given instructions and counseling regarding her condition or for health maintenance advice. Please see specific information pulled into the AVS if appropriate.     ZEHRA Lomax  [facility]

## 2023-11-09 NOTE — LETTER
November 9, 2023     Patient: Robina Dumont   YOB: 1965   Date of Visit: 11/9/2023       To Whom It May Concern:    It is my medical opinion that Robina Dumont may return to work 11/13.          Sincerely,    ZEHRA Lomax

## 2023-11-14 RX ORDER — ARIPIPRAZOLE 2 MG/1
2 TABLET ORAL DAILY
Qty: 90 TABLET | Refills: 1 | Status: SHIPPED | OUTPATIENT
Start: 2023-11-14

## 2023-11-14 RX ORDER — VILAZODONE HYDROCHLORIDE 40 MG/1
40 TABLET ORAL DAILY
Qty: 90 TABLET | Refills: 0 | Status: SHIPPED | OUTPATIENT
Start: 2023-11-14

## 2023-11-14 RX ORDER — POTASSIUM CHLORIDE 750 MG/1
10 CAPSULE, EXTENDED RELEASE ORAL DAILY
Qty: 90 CAPSULE | Refills: 1 | Status: SHIPPED | OUTPATIENT
Start: 2023-11-14

## 2023-11-28 DIAGNOSIS — G62.9 NEUROPATHY: ICD-10-CM

## 2023-11-29 RX ORDER — DULOXETIN HYDROCHLORIDE 60 MG/1
60 CAPSULE, DELAYED RELEASE ORAL DAILY
Qty: 90 CAPSULE | Refills: 3 | Status: SHIPPED | OUTPATIENT
Start: 2023-11-29

## 2023-11-29 RX ORDER — AMLODIPINE BESYLATE 2.5 MG/1
2.5 TABLET ORAL DAILY
Qty: 90 TABLET | Refills: 3 | Status: SHIPPED | OUTPATIENT
Start: 2023-11-29

## 2023-11-29 RX ORDER — MONTELUKAST SODIUM 10 MG/1
10 TABLET ORAL DAILY
Qty: 90 TABLET | Refills: 3 | Status: SHIPPED | OUTPATIENT
Start: 2023-11-29

## 2023-11-29 RX ORDER — FLUTICASONE PROPIONATE AND SALMETEROL 500; 50 UG/1; UG/1
1 POWDER RESPIRATORY (INHALATION)
Qty: 60 EACH | Refills: 2 | Status: SHIPPED | OUTPATIENT
Start: 2023-11-29

## 2023-11-29 RX ORDER — ALBUTEROL SULFATE 90 UG/1
2 AEROSOL, METERED RESPIRATORY (INHALATION) EVERY 6 HOURS PRN
Qty: 8.5 G | Refills: 11 | Status: SHIPPED | OUTPATIENT
Start: 2023-11-29

## 2023-11-29 RX ORDER — ATORVASTATIN CALCIUM 40 MG/1
40 TABLET, FILM COATED ORAL DAILY
Qty: 90 TABLET | Refills: 0 | Status: SHIPPED | OUTPATIENT
Start: 2023-11-29

## 2023-12-07 RX ORDER — LOSARTAN POTASSIUM AND HYDROCHLOROTHIAZIDE 25; 100 MG/1; MG/1
1 TABLET ORAL DAILY
Qty: 90 TABLET | Refills: 1 | Status: SHIPPED | OUTPATIENT
Start: 2023-12-07

## 2023-12-22 ENCOUNTER — OFFICE VISIT (OUTPATIENT)
Dept: INTERNAL MEDICINE | Facility: CLINIC | Age: 58
End: 2023-12-22
Payer: COMMERCIAL

## 2023-12-22 ENCOUNTER — PATIENT MESSAGE (OUTPATIENT)
Dept: INTERNAL MEDICINE | Facility: CLINIC | Age: 58
End: 2023-12-22

## 2023-12-22 VITALS
SYSTOLIC BLOOD PRESSURE: 128 MMHG | WEIGHT: 185 LBS | OXYGEN SATURATION: 98 % | TEMPERATURE: 98.3 F | HEIGHT: 61 IN | HEART RATE: 104 BPM | BODY MASS INDEX: 34.93 KG/M2 | DIASTOLIC BLOOD PRESSURE: 74 MMHG

## 2023-12-22 DIAGNOSIS — I15.2 HYPERTENSION ASSOCIATED WITH DIABETES: ICD-10-CM

## 2023-12-22 DIAGNOSIS — E11.9 TYPE 2 DIABETES MELLITUS WITHOUT COMPLICATION, WITHOUT LONG-TERM CURRENT USE OF INSULIN: ICD-10-CM

## 2023-12-22 DIAGNOSIS — Z23 NEED FOR VACCINATION FOR PNEUMOCOCCUS: Primary | ICD-10-CM

## 2023-12-22 DIAGNOSIS — R59.0 OCCIPITAL LYMPHADENOPATHY: ICD-10-CM

## 2023-12-22 DIAGNOSIS — E11.59 HYPERTENSION ASSOCIATED WITH DIABETES: ICD-10-CM

## 2023-12-22 DIAGNOSIS — N95.1 HOT FLASHES DUE TO MENOPAUSE: ICD-10-CM

## 2023-12-22 NOTE — ASSESSMENT & PLAN NOTE
Diabetes is unchanged.   Continue current treatment regimen.  Diabetes will be reassessed in 1 month.    Her weight is stable.   Will send a message to make sure that she still wants to start Victoza in January and send in prescription.  Ordered CBC with hemoglobin A1c.

## 2023-12-22 NOTE — PROGRESS NOTES
Office Visit      Patient Name: Robina Dumont  : 1965   MRN: [id number]     Chief Complaint:    Chief Complaint   Patient presents with    Diabetes    Hypothyroidism    Hypertension       History of Present Illness: Robina Dumont is a 58 y.o. female who is here today for a follow-up of diabetes, blood pressure, and thyroid problems.    She has had two enlarged lymph nodes on the back of her head for about 3 weeks, but she has been feeling well with no pain. She had pansinusitis the last time she was here in 2023. Antibiotic completed as ordered.  No sinusitis symptoms at this time.  She drinks plenty of water. Her migraines are not as severe as they previously were. She wakes up with a headache if she sleeps on her left side, but if she sleeps on her right side, she does not. She has tried adding pillows to her bed to increase comfort. Her headache is at the back and side of her head. She denies any neck strain, ear pain or dizziness.    She has not lost weight since her last visit. She would like to start back on medication to help her manage her weight. She was on Victoza, but her insurance did not cover it.      She has not been checking her blood glucose levels or blood pressure at home. She is on metformin. She also occasionally takes Ambien as needed for sleep. Taking medication as prescribed for HTN.  Denies chest pain, dyspnea, orthopnea, palpitations, lower extremity edema, confusion, weakness, visual disturbances., hypoglycemia, nausea, polydipsia, polyuria, polyphagia.    Hot flashes throughout the day and at night.  Soaks clothing.      Subjective      I have reviewed and the following portions of the patient's history were updated as appropriate: past family history, past medical history, past social history, past surgical history and problem list.      Current Outpatient Medications:     acetaminophen (TYLENOL) 500 MG tablet, Take 2 tablets by mouth Every 8 (Eight) Hours x1  week, then take as needed., Disp: 42 tablet, Rfl: 0    Advair Diskus 500-50 MCG/ACT DISKUS, Inhale 1 puff by mouth 2 (Two) Times a Day., Disp: 180 each, Rfl: 3    albuterol sulfate  (90 Base) MCG/ACT inhaler, Inhale 2 puffs Every 6 (Six) Hours As Needed for Wheezing or Shortness of Air., Disp: 8.5 g, Rfl: 11    amLODIPine (NORVASC) 2.5 MG tablet, Take 1 tablet by mouth Daily., Disp: 90 tablet, Rfl: 3    ARIPiprazole (ABILIFY) 2 MG tablet, Take 1 tablet by mouth Daily., Disp: 90 tablet, Rfl: 1    atorvastatin (LIPITOR) 40 MG tablet, Take 1 tablet by mouth Daily., Disp: 90 tablet, Rfl: 0    CBD oil (cannabidiol) capsule, Take  by mouth., Disp: , Rfl:     cetirizine (zyrTEC) 10 MG tablet, Take 1 tablet by mouth Daily., Disp: , Rfl:     Chlorcyclizine-Pseudoephed (STAHIST AD PO), Take  by mouth., Disp: , Rfl:     cholestyramine light 4 g powder, Dissolve 1 scoop of powder and drink by mouth 2 (Two) Times a Day As Needed for diarrhea, Disp: 231 g, Rfl: 0    cycloSPORINE (Restasis) 0.05 % ophthalmic emulsion, Apply 1 drop to eye(s) as directed by provider Every 12 (Twelve) Hours., Disp: , Rfl:     DULoxetine (CYMBALTA) 60 MG capsule, Take 1 capsule by mouth Daily., Disp: 90 capsule, Rfl: 3    Fezolinetant (VEOZAH) 45 MG tablet, Take 1 tablet by mouth Daily., Disp: 30 tablet, Rfl: 11    Fluticasone-Salmeterol (Advair Diskus) 500-50 MCG/ACT DISKUS, Inhale 1 puff 2 (Two) Times a Day., Disp: 60 each, Rfl: 2    guaiFENesin (MUCINEX) 600 MG 12 hr tablet, Take 2 tablets by mouth 2 (Two) Times a Day As Needed for Cough or Congestion., Disp: , Rfl:     Insulin Pen Needle (Pen Needles) 31G X 6 MM misc, 1 application Daily., Disp: 90 each, Rfl: 3    levothyroxine (SYNTHROID, LEVOTHROID) 50 MCG tablet, Take 1 tablet by mouth once daily, Disp: 90 tablet, Rfl: 0    Liraglutide (VICTOZA) 18 MG/3ML solution pen-injector injection, Inject 1.8 mg under the skin into the appropriate area as directed Daily., Disp: 27 mL, Rfl: 3     "losartan-hydrochlorothiazide (HYZAAR) 100-25 MG per tablet, Take 1 tablet by mouth Daily., Disp: 90 tablet, Rfl: 1    Melatonin 10 MG tablet, Take 3 tablets by mouth Every Night., Disp: , Rfl:     metFORMIN (GLUCOPHAGE) 1000 MG tablet, Take 1 tablet by mouth 2 (Two) Times a Day With Meals., Disp: 180 tablet, Rfl: 1    montelukast (SINGULAIR) 10 MG tablet, Take 1 tablet by mouth Daily., Disp: 90 tablet, Rfl: 3    olopatadine (PATANOL) 0.1 % ophthalmic solution, Apply 1 drop to eye(s) as directed by provider Every 12 (Twelve) Hours., Disp: , Rfl:     omeprazole (priLOSEC) 20 MG capsule, Take 1 capsule by mouth As Needed., Disp: , Rfl:     ondansetron (Zofran) 4 MG tablet, Take 1 tablet by mouth Every 8 (Eight) Hours As Needed for Nausea or Vomiting., Disp: 30 tablet, Rfl: 0    potassium chloride (MICRO-K) 10 MEQ CR capsule, Take 1 capsule by mouth Daily., Disp: 90 capsule, Rfl: 1    traZODone (DESYREL) 100 MG tablet, Take 2 tablets by mouth Every Night., Disp: 180 tablet, Rfl: 1    ubrogepant (Ubrelvy) 100 MG tablet, TAKE 1 TABLET WITH FIRST SYMPTOM OF MIGRAINE. MAY TAKE AGAIN 2 HOURS LATER IF MIGRAINE PERSISTS, Disp: 10 tablet, Rfl: 2    vilazodone (Viibryd) 40 MG tablet tablet, Take 1 tablet by mouth Daily., Disp: 90 tablet, Rfl: 0    zolpidem (AMBIEN) 5 MG tablet, Take 1 tablet by mouth At Night As Needed for Sleep, Disp: 20 tablet, Rfl: 0    Allergies   Allergen Reactions    Erythromycin GI Intolerance and Nausea Only    Sudanyl [Pseudoephedrine] Other (See Comments)     Facial swelling     Prednisone Other (See Comments)     Hypertension         Objective     Physical Exam:  Vital Signs:   Vitals:    12/22/23 1519   BP: 128/74   Pulse: 104   Temp: 98.3 °F (36.8 °C)   SpO2: 98%   Weight: 83.9 kg (185 lb)   Height: 154.9 cm (60.98\")     Body mass index is 34.97 kg/m².         Physical Exam  Constitutional:       Appearance: She is not ill-appearing.   HENT:      Head: Normocephalic.      Right Ear: External ear " normal.      Left Ear: External ear normal.   Eyes:      Conjunctiva/sclera: Conjunctivae normal.      Pupils: Pupils are equal, round, and reactive to light.   Cardiovascular:      Rate and Rhythm: Normal rate and regular rhythm.      Pulses:           Radial pulses are 2+ on the right side and 2+ on the left side.        Dorsalis pedis pulses are 2+ on the right side and 2+ on the left side.      Heart sounds: Normal heart sounds.   Pulmonary:      Effort: Pulmonary effort is normal.      Breath sounds: Normal breath sounds.   Musculoskeletal:      Cervical back: Normal range of motion and neck supple.      Right lower leg: No edema.      Left lower leg: No edema.   Lymphadenopathy:      Head:      Right side of head: Occipital adenopathy present. No submental, submandibular, tonsillar, preauricular or posterior auricular adenopathy.      Left side of head: Occipital adenopathy present. No submental, submandibular, tonsillar, preauricular or posterior auricular adenopathy.      Cervical: No cervical adenopathy.   Skin:     General: Skin is warm.      Capillary Refill: Capillary refill takes less than 2 seconds.   Neurological:      Mental Status: She is alert and oriented to person, place, and time.      Coordination: Coordination normal.      Gait: Gait normal.   Psychiatric:         Mood and Affect: Mood normal.         Behavior: Behavior normal.         Thought Content: Thought content normal.             Assessment / Plan      Assessment/Plan:   Diagnoses and all orders for this visit:    1. Need for vaccination for pneumococcus (Primary)  -     Pneumococcal Conjugate Vaccine 20-Valent (PCV20)    2. Hot flashes due to menopause  -     Fezolinetant (VEOZAH) 45 MG tablet; Take 1 tablet by mouth Daily.  Dispense: 30 tablet; Refill: 11    3. Type 2 diabetes mellitus without complication, without long-term current use of insulin  Assessment & Plan:  Diabetes is unchanged.   Continue current treatment  regimen.  Diabetes will be reassessed in 1 month.  Her weight is stable.   Will send a message to make sure that she still wants to start Victoza in January and send in prescription.  Ordered CBC with hemoglobin A1c.     Orders:  -     Hemoglobin A1c    4. Hypertension associated with diabetes        - Follow heart healthy diet.  Keep sodium intake < 1500 mg per day.  Avoid processed & fast foods.          - Exercise as tolerated, with a goal of 30 minutes of moderate exercise most days.         - Take medications as prescribed.    5. Occipital lymphadenopathy  -     Peripheral Blood Smear            Follow Up:   Return in about 6 months (around 6/22/2024) for Annual.    Patient was given instructions and counseling regarding her condition or for health maintenance advice. Please see specific information pulled into the AVS if appropriate.       [facility] [address] [facility]     Please note that portions of this note may have been completed with a voice recognition program. Efforts were made to edit dictation, but occasionally words are mistranscribed.     Transcribed from ambient dictation for ZEHRA Freedman by Esther Castellano.   12/22/23   18:00 EST    Patient or patient representative verbalized consent to the visit recording.  I have personally performed the services described in this document as transcribed by the above individual, and it is both accurate and complete.

## 2023-12-22 NOTE — ASSESSMENT & PLAN NOTE
Hypertension is unchanged.  Continue current treatment regimen.  Blood pressure will be reassessed at the next regular appointment.    Instructed her to monitor closely.

## 2024-01-04 DIAGNOSIS — E03.9 ACQUIRED HYPOTHYROIDISM: ICD-10-CM

## 2024-01-05 ENCOUNTER — OFFICE VISIT (OUTPATIENT)
Age: 59
End: 2024-01-05
Payer: COMMERCIAL

## 2024-01-05 VITALS
SYSTOLIC BLOOD PRESSURE: 126 MMHG | BODY MASS INDEX: 35.23 KG/M2 | TEMPERATURE: 96.1 F | DIASTOLIC BLOOD PRESSURE: 82 MMHG | OXYGEN SATURATION: 100 % | HEIGHT: 61 IN | WEIGHT: 186.6 LBS | HEART RATE: 93 BPM

## 2024-01-05 DIAGNOSIS — J01.10 ACUTE NON-RECURRENT FRONTAL SINUSITIS: Primary | ICD-10-CM

## 2024-01-05 LAB
BASOPHILS # BLD AUTO: 0.1 X10E3/UL (ref 0–0.2)
BASOPHILS NFR BLD AUTO: 1 %
EOSINOPHIL # BLD AUTO: 0.1 X10E3/UL (ref 0–0.4)
EOSINOPHIL NFR BLD AUTO: 2 %
ERYTHROCYTE [DISTWIDTH] IN BLOOD BY AUTOMATED COUNT: 12.8 % (ref 11.7–15.4)
EXPIRATION DATE: NORMAL
EXPIRATION DATE: NORMAL
FLUAV AG UPPER RESP QL IA.RAPID: NOT DETECTED
FLUBV AG UPPER RESP QL IA.RAPID: NOT DETECTED
HBA1C MFR BLD: 6.4 % (ref 4.8–5.6)
HCT VFR BLD AUTO: 36.3 % (ref 34–46.6)
HGB BLD-MCNC: 12.6 G/DL (ref 11.1–15.9)
IMM GRANULOCYTES # BLD AUTO: 0 X10E3/UL (ref 0–0.1)
IMM GRANULOCYTES NFR BLD AUTO: 0 %
INTERNAL CONTROL: NORMAL
INTERNAL CONTROL: NORMAL
LYMPHOCYTES # BLD AUTO: 3.3 X10E3/UL (ref 0.7–3.1)
LYMPHOCYTES NFR BLD AUTO: 38 %
Lab: NORMAL
Lab: NORMAL
MCH RBC QN AUTO: 29.2 PG (ref 26.6–33)
MCHC RBC AUTO-ENTMCNC: 34.7 G/DL (ref 31.5–35.7)
MCV RBC AUTO: 84 FL (ref 79–97)
MONOCYTES # BLD AUTO: 0.6 X10E3/UL (ref 0.1–0.9)
MONOCYTES NFR BLD AUTO: 7 %
NEUTROPHILS # BLD AUTO: 4.6 X10E3/UL (ref 1.4–7)
NEUTROPHILS NFR BLD AUTO: 52 %
PATH INTERP BLD-IMP: ABNORMAL
PATH REV BLD -IMP: ABNORMAL
PATHOLOGIST NAME: ABNORMAL
PLATELET # BLD AUTO: 357 X10E3/UL (ref 150–450)
RBC # BLD AUTO: 4.31 X10E6/UL (ref 3.77–5.28)
S PYO AG THROAT QL: NEGATIVE
SARS-COV-2 AG UPPER RESP QL IA.RAPID: NOT DETECTED
WBC # BLD AUTO: 8.8 X10E3/UL (ref 3.4–10.8)

## 2024-01-05 PROCEDURE — 3079F DIAST BP 80-89 MM HG: CPT | Performed by: FAMILY MEDICINE

## 2024-01-05 PROCEDURE — 3044F HG A1C LEVEL LT 7.0%: CPT | Performed by: FAMILY MEDICINE

## 2024-01-05 PROCEDURE — 99213 OFFICE O/P EST LOW 20 MIN: CPT | Performed by: FAMILY MEDICINE

## 2024-01-05 PROCEDURE — 3074F SYST BP LT 130 MM HG: CPT | Performed by: FAMILY MEDICINE

## 2024-01-05 RX ORDER — METHYLPREDNISOLONE 4 MG/1
TABLET ORAL
Qty: 21 TABLET | Refills: 0 | Status: SHIPPED | OUTPATIENT
Start: 2024-01-05

## 2024-01-05 RX ORDER — AMOXICILLIN AND CLAVULANATE POTASSIUM 875; 125 MG/1; MG/1
1 TABLET, FILM COATED ORAL 2 TIMES DAILY
Qty: 14 TABLET | Refills: 0 | Status: SHIPPED | OUTPATIENT
Start: 2024-01-05

## 2024-01-05 RX ORDER — LEVOTHYROXINE SODIUM 0.05 MG/1
50 TABLET ORAL DAILY
Qty: 90 TABLET | Refills: 0 | Status: SHIPPED | OUTPATIENT
Start: 2024-01-05

## 2024-01-05 NOTE — PROGRESS NOTES
Follow Up Office Visit      Date: 2024   Patient Name: Robina Dumont  : 1965   MRN: [id number]     Chief Complaint:    Chief Complaint   Patient presents with    Chills    Sore Throat    Cough       History of Present Illness: Robina Dumont is a 58 y.o. female who is here today for sore throat, cough.    Symptoms started about 3 days ago.  Unsure if she has had any sick contacts    Subjective      Review of Systems:   Review of Systems   Constitutional:  Positive for chills. Negative for fever.   HENT:  Positive for congestion.    Eyes:  Negative for discharge.   Respiratory:  Positive for cough.    Cardiovascular:  Negative for chest pain.   Gastrointestinal:  Negative for nausea and vomiting.       I have reviewed the patients family history, social history, past medical history, past surgical history and have updated it as appropriate.     Medications:     Current Outpatient Medications:     acetaminophen (TYLENOL) 500 MG tablet, Take 2 tablets by mouth Every 8 (Eight) Hours x1 week, then take as needed., Disp: 42 tablet, Rfl: 0    Advair Diskus 500-50 MCG/ACT DISKUS, Inhale 1 puff by mouth 2 (Two) Times a Day., Disp: 180 each, Rfl: 3    albuterol sulfate  (90 Base) MCG/ACT inhaler, Inhale 2 puffs Every 6 (Six) Hours As Needed for Wheezing or Shortness of Air., Disp: 8.5 g, Rfl: 11    amLODIPine (NORVASC) 2.5 MG tablet, Take 1 tablet by mouth Daily., Disp: 90 tablet, Rfl: 3    ARIPiprazole (ABILIFY) 2 MG tablet, Take 1 tablet by mouth Daily., Disp: 90 tablet, Rfl: 1    atorvastatin (LIPITOR) 40 MG tablet, Take 1 tablet by mouth Daily., Disp: 90 tablet, Rfl: 0    CBD oil (cannabidiol) capsule, Take  by mouth., Disp: , Rfl:     cetirizine (zyrTEC) 10 MG tablet, Take 1 tablet by mouth Daily., Disp: , Rfl:     Chlorcyclizine-Pseudoephed (STAHIST AD PO), Take  by mouth., Disp: , Rfl:     cholestyramine light 4 g powder, Dissolve 1 scoop of powder and drink by mouth 2 (Two) Times a  Day As Needed for diarrhea, Disp: 231 g, Rfl: 0    cycloSPORINE (Restasis) 0.05 % ophthalmic emulsion, Apply 1 drop to eye(s) as directed by provider Every 12 (Twelve) Hours., Disp: , Rfl:     DULoxetine (CYMBALTA) 60 MG capsule, Take 1 capsule by mouth Daily., Disp: 90 capsule, Rfl: 3    Fezolinetant (VEOZAH) 45 MG tablet, Take 1 tablet by mouth Daily., Disp: 30 tablet, Rfl: 11    Fluticasone-Salmeterol (Advair Diskus) 500-50 MCG/ACT DISKUS, Inhale 1 puff 2 (Two) Times a Day., Disp: 60 each, Rfl: 2    guaiFENesin (MUCINEX) 600 MG 12 hr tablet, Take 2 tablets by mouth 2 (Two) Times a Day As Needed for Cough or Congestion., Disp: , Rfl:     Insulin Pen Needle (Pen Needles) 31G X 6 MM misc, 1 application Daily., Disp: 90 each, Rfl: 3    levothyroxine (SYNTHROID, LEVOTHROID) 50 MCG tablet, Take 1 tablet by mouth Daily., Disp: 90 tablet, Rfl: 0    Liraglutide (VICTOZA) 18 MG/3ML solution pen-injector injection, Inject 1.8 mg under the skin into the appropriate area as directed Daily., Disp: 27 mL, Rfl: 3    losartan-hydrochlorothiazide (HYZAAR) 100-25 MG per tablet, Take 1 tablet by mouth Daily., Disp: 90 tablet, Rfl: 1    Melatonin 10 MG tablet, Take 3 tablets by mouth Every Night., Disp: , Rfl:     metFORMIN (GLUCOPHAGE) 1000 MG tablet, Take 1 tablet by mouth 2 (Two) Times a Day With Meals., Disp: 180 tablet, Rfl: 1    montelukast (SINGULAIR) 10 MG tablet, Take 1 tablet by mouth Daily., Disp: 90 tablet, Rfl: 3    olopatadine (PATANOL) 0.1 % ophthalmic solution, Apply 1 drop to eye(s) as directed by provider Every 12 (Twelve) Hours., Disp: , Rfl:     omeprazole (priLOSEC) 20 MG capsule, Take 1 capsule by mouth As Needed., Disp: , Rfl:     ondansetron (Zofran) 4 MG tablet, Take 1 tablet by mouth Every 8 (Eight) Hours As Needed for Nausea or Vomiting., Disp: 30 tablet, Rfl: 0    potassium chloride (MICRO-K) 10 MEQ CR capsule, Take 1 capsule by mouth Daily., Disp: 90 capsule, Rfl: 1    traZODone (DESYREL) 100 MG tablet,  "Take 2 tablets by mouth Every Night., Disp: 180 tablet, Rfl: 1    ubrogepant (Ubrelvy) 100 MG tablet, TAKE 1 TABLET WITH FIRST SYMPTOM OF MIGRAINE. MAY TAKE AGAIN 2 HOURS LATER IF MIGRAINE PERSISTS, Disp: 10 tablet, Rfl: 2    vilazodone (Viibryd) 40 MG tablet tablet, Take 1 tablet by mouth Daily., Disp: 90 tablet, Rfl: 0    zolpidem (AMBIEN) 5 MG tablet, Take 1 tablet by mouth At Night As Needed for Sleep, Disp: 20 tablet, Rfl: 0    amoxicillin-clavulanate (AUGMENTIN) 875-125 MG per tablet, Take 1 tablet by mouth 2 (Two) Times a Day., Disp: 14 tablet, Rfl: 0    methylPREDNISolone (MEDROL) 4 MG dose pack, Take as directed on package instructions., Disp: 21 tablet, Rfl: 0    Allergies:   Allergies   Allergen Reactions    Erythromycin GI Intolerance and Nausea Only    Sudanyl [Pseudoephedrine] Other (See Comments)     Facial swelling     Prednisone Other (See Comments)     Hypertension         Objective     Physical Exam: Please see above  Vital Signs:   Vitals:    01/05/24 1353   BP: 126/82   BP Location: Left arm   Patient Position: Sitting   Cuff Size: Adult   Pulse: 93   Temp: 96.1 °F (35.6 °C)   TempSrc: Temporal   SpO2: 100%   Weight: 84.6 kg (186 lb 9.6 oz)   Height: 154.9 cm (60.98\")     Body mass index is 35.28 kg/m².    Physical Exam  Constitutional:       Appearance: Normal appearance.   HENT:      Head: Normocephalic.      Comments: TTP over frontal and maxillary sinuses     Right Ear: Tympanic membrane normal.      Left Ear: Tympanic membrane normal.      Mouth/Throat:      Lips: Pink.   Cardiovascular:      Rate and Rhythm: Normal rate and regular rhythm.   Pulmonary:      Effort: Pulmonary effort is normal.      Breath sounds: Normal breath sounds.   Neurological:      Mental Status: She is alert.         Procedures    Results:   Labs:   Hemoglobin A1C   Date Value Ref Range Status   01/03/2024 6.4 (H) 4.8 - 5.6 % Final     Comment:              Prediabetes: 5.7 - 6.4           Diabetes: >6.4           " Glycemic control for adults with diabetes: <7.0     06/10/2021 6.60 (H) 4.80 - 5.60 % Final     TSH   Date Value Ref Range Status   06/15/2023 0.651 0.450 - 4.500 uIU/mL Final        POCT Results (if applicable):   Results for orders placed or performed in visit on 12/22/23   Hemoglobin A1c    Specimen: Blood   Result Value Ref Range    Hemoglobin A1C 6.4 (H) 4.8 - 5.6 %   Peripheral Blood Smear    Specimen: Blood   Result Value Ref Range    WBC Comment     RBC Appear normal.     Platelets Appear normal.     Comment Comment     Pathologist Review Comment     WBC 8.8 3.4 - 10.8 x10E3/uL    RBC 4.31 3.77 - 5.28 x10E6/uL    Hemoglobin 12.6 11.1 - 15.9 g/dL    Hematocrit 36.3 34.0 - 46.6 %    MCV 84 79 - 97 fL    MCH 29.2 26.6 - 33.0 pg    MCHC 34.7 31.5 - 35.7 g/dL    RDW 12.8 11.7 - 15.4 %    Platelets 357 150 - 450 x10E3/uL    Neutrophil Rel % 52 Not Estab. %    Lymphocyte Rel % 38 Not Estab. %    Monocyte Rel % 7 Not Estab. %    Eosinophil Rel % 2 Not Estab. %    Basophil Rel % 1 Not Estab. %    Neutrophils Absolute 4.6 1.4 - 7.0 x10E3/uL    Lymphocytes Absolute 3.3 (H) 0.7 - 3.1 x10E3/uL    Monocytes Absolute 0.6 0.1 - 0.9 x10E3/uL    Eosinophils Absolute 0.1 0.0 - 0.4 x10E3/uL    Basophils Absolute 0.1 0.0 - 0.2 x10E3/uL    Immature Granulocyte Rel % 0 Not Estab. %    Immature Grans Absolute 0.0 0.0 - 0.1 x10E3/uL       Imaging:   No valid procedures specified.       Assessment / Plan      Assessment/Plan:   Diagnoses and all orders for this visit:    1. Acute non-recurrent frontal sinusitis (Primary)  -     methylPREDNISolone (MEDROL) 4 MG dose pack; Take as directed on package instructions.  Dispense: 21 tablet; Refill: 0  -     amoxicillin-clavulanate (AUGMENTIN) 875-125 MG per tablet; Take 1 tablet by mouth 2 (Two) Times a Day.  Dispense: 14 tablet; Refill: 0               Vaccine Counseling:      Follow Up:   No follow-ups on file.      Mina Robin,    [facility]

## 2024-01-08 NOTE — TELEPHONE ENCOUNTER
From: Lisandra Mathew  To: Robina Dumont  Sent: 1/2/2024 7:23 AM EST  Subject: victoza    Ready to start injections again?

## 2024-01-10 ENCOUNTER — TELEPHONE (OUTPATIENT)
Dept: INTERNAL MEDICINE | Facility: CLINIC | Age: 59
End: 2024-01-10
Payer: COMMERCIAL

## 2024-01-10 NOTE — TELEPHONE ENCOUNTER
"     Caller: Robina Dumont \"SHAKEEL\"    Relationship: Self    Best call back number: [phone]    Which medication are you concerned about: Fezolinetant (VEOZAH) 45 MG tablet  AND Liraglutide (VICTOZA) 18 MG/3ML solution pen-injector injection     Who prescribed you this medication: CHELSIE WILDER         What are your concerns: WANTING TO CHECK ON PRIOR AUTHORIZATION PROGRESS         [facility]"

## 2024-01-15 NOTE — TELEPHONE ENCOUNTER
jaky marie Key: [id number] - PA Case ID: [id number] help? Call us at [phone]  Status  [email or web address]  Drug  Victoza 18MG/3ML pen-injectors  Form  [employer] [place] Medicaid ePA Form 2017

## 2024-01-16 NOTE — TELEPHONE ENCOUNTER
Called the number and spoke with a rep. [employer] approved 1/15/24-1/14/25. The Veozah is to be determined still. Can take up to 72 hours. Will be notified via fax or patient can call.

## 2024-01-16 NOTE — TELEPHONE ENCOUNTER
The prior authorization request has been cancelled. For questions or additional information, please call [phone].

## 2024-01-19 ENCOUNTER — PRIOR AUTHORIZATION (OUTPATIENT)
Dept: INTERNAL MEDICINE | Facility: CLINIC | Age: 59
End: 2024-01-19
Payer: COMMERCIAL

## 2024-01-19 NOTE — TELEPHONE ENCOUNTER
jaky marie (Key: [id number])Need help? Call us at [phone]  Status  Sent to [facility]  The plan will fax you a determination, typically within 1 to 5 business days.  Drug  Veozah 45MG tablets  Form  [employer] by [employer] [place] Medicare Prescription Drug Coverage Determination Form

## 2024-01-23 DIAGNOSIS — E03.9 ACQUIRED HYPOTHYROIDISM: ICD-10-CM

## 2024-01-23 DIAGNOSIS — F51.04 PSYCHOPHYSIOLOGIC INSOMNIA: ICD-10-CM

## 2024-01-24 RX ORDER — ZOLPIDEM TARTRATE 5 MG/1
5 TABLET ORAL NIGHTLY PRN
Qty: 30 TABLET | Refills: 0 | Status: SHIPPED | OUTPATIENT
Start: 2024-01-24

## 2024-01-24 RX ORDER — LEVOTHYROXINE SODIUM 0.05 MG/1
50 TABLET ORAL DAILY
Qty: 90 TABLET | Refills: 0 | Status: SHIPPED | OUTPATIENT
Start: 2024-01-24

## 2024-01-24 NOTE — TELEPHONE ENCOUNTER
Rx Refill Note  Requested Prescriptions     Pending Prescriptions Disp Refills    zolpidem (AMBIEN) 5 MG tablet 20 tablet 0     Sig: Take 1 tablet by mouth At Night As Needed for Sleep    levothyroxine (SYNTHROID, LEVOTHROID) 50 MCG tablet 90 tablet 0     Sig: Take 1 tablet by mouth Daily.      Last office visit with prescribing clinician: 12/22/2023   Last telemedicine visit with prescribing clinician: Visit date not found   Next office visit with prescribing clinician: 6/24/2024                         Would you like a call back once the refill request has been completed: [] Yes [] No    If the office needs to give you a call back, can they leave a voicemail: [] Yes [] No    Sandra Saucedo MA  01/24/24, 07:45 EST

## 2024-01-29 ENCOUNTER — OFFICE VISIT (OUTPATIENT)
Dept: INTERNAL MEDICINE | Facility: CLINIC | Age: 59
End: 2024-01-29
Payer: COMMERCIAL

## 2024-01-29 VITALS
SYSTOLIC BLOOD PRESSURE: 127 MMHG | OXYGEN SATURATION: 98 % | BODY MASS INDEX: 34.74 KG/M2 | WEIGHT: 184 LBS | TEMPERATURE: 96.9 F | DIASTOLIC BLOOD PRESSURE: 84 MMHG | HEART RATE: 92 BPM | RESPIRATION RATE: 18 BRPM | HEIGHT: 61 IN

## 2024-01-29 DIAGNOSIS — F51.04 PSYCHOPHYSIOLOGIC INSOMNIA: Primary | ICD-10-CM

## 2024-01-29 DIAGNOSIS — M62.838 MUSCLE SPASM OF RIGHT SHOULDER: ICD-10-CM

## 2024-01-29 DIAGNOSIS — G43.009 MIGRAINE WITHOUT AURA AND WITHOUT STATUS MIGRAINOSUS, NOT INTRACTABLE: ICD-10-CM

## 2024-01-29 DIAGNOSIS — G47.33 OSA ON CPAP: ICD-10-CM

## 2024-01-29 DIAGNOSIS — M25.511 ACUTE PAIN OF RIGHT SHOULDER: ICD-10-CM

## 2024-01-29 PROCEDURE — 1160F RVW MEDS BY RX/DR IN RCRD: CPT | Performed by: NURSE PRACTITIONER

## 2024-01-29 PROCEDURE — 1159F MED LIST DOCD IN RCRD: CPT | Performed by: NURSE PRACTITIONER

## 2024-01-29 PROCEDURE — 99214 OFFICE O/P EST MOD 30 MIN: CPT | Performed by: NURSE PRACTITIONER

## 2024-01-29 PROCEDURE — 3074F SYST BP LT 130 MM HG: CPT | Performed by: NURSE PRACTITIONER

## 2024-01-29 PROCEDURE — 3079F DIAST BP 80-89 MM HG: CPT | Performed by: NURSE PRACTITIONER

## 2024-01-29 PROCEDURE — 3044F HG A1C LEVEL LT 7.0%: CPT | Performed by: NURSE PRACTITIONER

## 2024-01-29 RX ORDER — HYDROCODONE BITARTRATE AND ACETAMINOPHEN 5; 325 MG/1; MG/1
1 TABLET ORAL EVERY 6 HOURS PRN
Qty: 12 TABLET | Refills: 0 | Status: SHIPPED | OUTPATIENT
Start: 2024-01-29 | End: 2024-02-01

## 2024-01-29 RX ORDER — CYCLOBENZAPRINE HCL 10 MG
10 TABLET ORAL 2 TIMES DAILY PRN
Qty: 30 TABLET | Refills: 1 | Status: SHIPPED | OUTPATIENT
Start: 2024-01-29

## 2024-02-06 RX ORDER — VILAZODONE HYDROCHLORIDE 40 MG/1
40 TABLET ORAL DAILY
Qty: 90 TABLET | Refills: 1 | Status: SHIPPED | OUTPATIENT
Start: 2024-02-06

## 2024-02-08 ENCOUNTER — TELEPHONE (OUTPATIENT)
Dept: INTERNAL MEDICINE | Facility: CLINIC | Age: 59
End: 2024-02-08
Payer: COMMERCIAL

## 2024-02-08 NOTE — TELEPHONE ENCOUNTER
"Caller: Robina Dumont \"SHAKEEL\"    Relationship: Self    Best call back number: [phone]     What is the medical concern/diagnosis: HEADACHES    What specialty or service is being requested: NEUROLOGY    Any additional details: NEUROLOGY IS NOT ABLE TO SCHEDULE UNTIL THE PROGRESS NOTE IS SIGNED. PLEASE SIGN AND SUBMIT.    PLEASE HAVE NEURO CALL TO SCHEDULE ONCE COMPLETE.  "

## 2024-02-29 RX ORDER — ATORVASTATIN CALCIUM 40 MG/1
40 TABLET, FILM COATED ORAL DAILY
Qty: 90 TABLET | Refills: 0 | Status: SHIPPED | OUTPATIENT
Start: 2024-02-29

## 2024-03-26 ENCOUNTER — OFFICE VISIT (OUTPATIENT)
Dept: NEUROLOGY | Facility: CLINIC | Age: 59
End: 2024-03-26
Payer: COMMERCIAL

## 2024-03-26 VITALS
BODY MASS INDEX: 35.53 KG/M2 | HEART RATE: 91 BPM | OXYGEN SATURATION: 98 % | SYSTOLIC BLOOD PRESSURE: 124 MMHG | TEMPERATURE: 96.8 F | WEIGHT: 188.2 LBS | DIASTOLIC BLOOD PRESSURE: 82 MMHG | HEIGHT: 61 IN

## 2024-03-26 DIAGNOSIS — G43.009 MIGRAINE WITHOUT AURA AND WITHOUT STATUS MIGRAINOSUS, NOT INTRACTABLE: ICD-10-CM

## 2024-03-26 DIAGNOSIS — I10 HYPERTENSION, UNSPECIFIED TYPE: ICD-10-CM

## 2024-03-26 DIAGNOSIS — E78.49 OTHER HYPERLIPIDEMIA: ICD-10-CM

## 2024-03-26 DIAGNOSIS — G47.33 OSA ON CPAP: Primary | ICD-10-CM

## 2024-03-26 DIAGNOSIS — G43.719 INTRACTABLE CHRONIC MIGRAINE WITHOUT AURA AND WITHOUT STATUS MIGRAINOSUS: ICD-10-CM

## 2024-03-26 RX ORDER — ATOGEPANT 60 MG/1
60 TABLET ORAL DAILY
Qty: 8 TABLET | Refills: 0 | COMMUNITY
Start: 2024-03-26

## 2024-03-26 NOTE — PROGRESS NOTES
New Sleep Patient Office Visit      Patient Name: Robina Dumont  : 1965   MRN: [id number]     Referring Physician: Lisandra Mathew, *    Chief Complaint:    Chief Complaint   Patient presents with    Consult     NP, In office to establish care for jimmy. Patient stated she is on cpap therapy and has been for 20 years. She stated she would like to have a new sleep study done. Stated she had weightloss surgery and has lost 60pounds.         migraines     Patient stated she has migraines and goes through a 10 pack a month of ubrelvy.         History of Present Illness: Robina Dumont is a 59 y.o. female who is here today to establish care with Sleep Medicine for JIMMY- currently on CPAP therapy and was using [facility].  She was initially diagnosed with significant JIMMY about 20 years ago.  Currently on CPAP therapy.  She also mentions she has lost a significant amount of weight since her previous sleep study and would like to know if she still needs to be on CPAP therapy.  She takes Ambien 5mg QHS PRN, Melatonin QHS PRN, Trazodone 200mg QHS, and CBD gummies for sleep nightly and those do help.  She has had insomnia (difficulty falling asleep) since she was a kid.  Additional risk factors- BMI 35, HTN, dyslipidemia, diabetes, insomnia, migraine disorder, GERD, hypothyroidism.   *Current compliance report requested for review from [employer].     Migraine disorder- has had migraines since she was a child- around onset of menses; currently taking Excedrin OTC PRN, Tylenol OTC PRN and Ubrelvy 100mg PRN (taking 10 per month); has had 15-20/30 headache days in the past month with about 10-12 being migraines; migraines associated with photophobia, nausea, phonophobia and last more than 4 hours.   *Previously tried Topiramate, Amitriptyline, Propranolol, Sumatriptan for migraines.     Wellford Score: 5    Subjective      Review of Systems:   Review of Systems   Respiratory:  Positive for apnea.     Neurological:  Positive for headache.   Psychiatric/Behavioral:  Positive for sleep disturbance.        Past Medical History:   Past Medical History:   Diagnosis Date    Anxiety     Anxiety and depression     Arthritis     Asthma     Depression     Diabetes mellitus     Disease of thyroid gland     GERD (gastroesophageal reflux disease)     Hyperlipidemia     Hypertension     Migraines     Sleep apnea     c-pap setting reported as 13        Past Surgical History:   Past Surgical History:   Procedure Laterality Date    ABDOMINAL SURGERY      laparoscopic expolratory    BARIATRIC SURGERY  2019    BLEPHAROPLASTY Bilateral     CATARACT EXTRACTION, BILATERAL      CERVIX LESION DESTRUCTION      COLONOSCOPY  2017    COSMETIC SURGERY Bilateral     blepharoplasty     EYE SURGERY Bilateral     cataract    FINGER/THUMB ARTHROPLASTY Bilateral     GASTRIC BYPASS      JOINT REPLACEMENT Bilateral     thumb     KNEE ARTHROSCOPY Right     TOTAL HIP ARTHROPLASTY Left 2021    Procedure: TOTAL HIP ARTHROPLASTY LEFT;  Surgeon: Jeremiah Alcala MD;  Location: [facility];  Service: Orthopedics;  Laterality: Left;       Family History:   Family History   Problem Relation Age of Onset    Heart attack Mother     Hypertension Mother     Cancer Mother             Diabetes Father     Heart attack Father     Hypertension Father     Diabetes Paternal Grandmother     Breast cancer Maternal Aunt     Ovarian cancer Neg Hx        Social History:   Social History     Socioeconomic History    Marital status:    Tobacco Use    Smoking status: Never    Smokeless tobacco: Never   Vaping Use    Vaping status: Never Used   Substance and Sexual Activity    Alcohol use: Yes     Comment: rarely    Drug use: Never    Sexual activity: Not Currently     Partners: Male       Medications:     Current Outpatient Medications:     acetaminophen (TYLENOL) 500 MG tablet, Take 2 tablets by mouth Every 8 (Eight) Hours x1 week, then take as  needed., Disp: 42 tablet, Rfl: 0    Advair Diskus 500-50 MCG/ACT DISKUS, Inhale 1 puff by mouth 2 (Two) Times a Day., Disp: 180 each, Rfl: 3    albuterol sulfate  (90 Base) MCG/ACT inhaler, Inhale 2 puffs Every 6 (Six) Hours As Needed for Wheezing or Shortness of Air., Disp: 8.5 g, Rfl: 11    amLODIPine (NORVASC) 2.5 MG tablet, Take 1 tablet by mouth Daily., Disp: 90 tablet, Rfl: 3    amoxicillin-clavulanate (AUGMENTIN) 875-125 MG per tablet, Take 1 tablet by mouth 2 (Two) Times a Day., Disp: 14 tablet, Rfl: 0    ARIPiprazole (ABILIFY) 2 MG tablet, Take 1 tablet by mouth Daily., Disp: 90 tablet, Rfl: 1    atorvastatin (LIPITOR) 40 MG tablet, Take 1 tablet by mouth Daily., Disp: 90 tablet, Rfl: 0    CBD oil (cannabidiol) capsule, Take  by mouth., Disp: , Rfl:     cetirizine (zyrTEC) 10 MG tablet, Take 1 tablet by mouth Daily., Disp: , Rfl:     Chlorcyclizine-Pseudoephed (STAHIST AD PO), Take  by mouth., Disp: , Rfl:     cholestyramine light 4 g powder, Dissolve 1 scoop of powder and drink by mouth 2 (Two) Times a Day As Needed for diarrhea, Disp: 231 g, Rfl: 0    cyclobenzaprine (FLEXERIL) 10 MG tablet, Take 1 tablet by mouth 2 (Two) Times a Day As Needed for Muscle Spasms., Disp: 30 tablet, Rfl: 1    cycloSPORINE (Restasis) 0.05 % ophthalmic emulsion, Apply 1 drop to eye(s) as directed by provider Every 12 (Twelve) Hours., Disp: , Rfl:     DULoxetine (CYMBALTA) 60 MG capsule, Take 1 capsule by mouth Daily., Disp: 90 capsule, Rfl: 3    Fezolinetant (VEOZAH) 45 MG tablet, Take 1 tablet by mouth Daily., Disp: 30 tablet, Rfl: 11    Fluticasone-Salmeterol (Advair Diskus) 500-50 MCG/ACT DISKUS, Inhale 1 puff 2 (Two) Times a Day., Disp: 60 each, Rfl: 2    guaiFENesin (MUCINEX) 600 MG 12 hr tablet, Take 2 tablets by mouth 2 (Two) Times a Day As Needed for Cough or Congestion., Disp: , Rfl:     Insulin Pen Needle (Pen Needles) 31G X 6 MM misc, 1 application Daily., Disp: 90 each, Rfl: 3    levothyroxine (SYNTHROID,  LEVOTHROID) 50 MCG tablet, Take 1 tablet by mouth Daily., Disp: 90 tablet, Rfl: 0    Liraglutide (VICTOZA) 18 MG/3ML solution pen-injector injection, Inject 1.8 mg under the skin into the appropriate area as directed Daily., Disp: 27 mL, Rfl: 3    losartan-hydrochlorothiazide (HYZAAR) 100-25 MG per tablet, Take 1 tablet by mouth Daily., Disp: 90 tablet, Rfl: 1    Melatonin 10 MG tablet, Take 3 tablets by mouth Every Night., Disp: , Rfl:     metFORMIN (GLUCOPHAGE) 1000 MG tablet, Take 1 tablet by mouth 2 (Two) Times a Day With Meals., Disp: 180 tablet, Rfl: 1    methylPREDNISolone (MEDROL) 4 MG dose pack, Take as directed on package instructions., Disp: 21 tablet, Rfl: 0    montelukast (SINGULAIR) 10 MG tablet, Take 1 tablet by mouth Daily., Disp: 90 tablet, Rfl: 3    olopatadine (PATANOL) 0.1 % ophthalmic solution, Apply 1 drop to eye(s) as directed by provider Every 12 (Twelve) Hours., Disp: , Rfl:     omeprazole (priLOSEC) 20 MG capsule, Take 1 capsule by mouth As Needed., Disp: , Rfl:     ondansetron (Zofran) 4 MG tablet, Take 1 tablet by mouth Every 8 (Eight) Hours As Needed for Nausea or Vomiting., Disp: 30 tablet, Rfl: 0    potassium chloride (MICRO-K) 10 MEQ CR capsule, Take 1 capsule by mouth Daily., Disp: 90 capsule, Rfl: 1    traZODone (DESYREL) 100 MG tablet, Take 2 tablets by mouth Every Night., Disp: 180 tablet, Rfl: 1    ubrogepant (Ubrelvy) 100 MG tablet, TAKE 1 TABLET WITH FIRST SYMPTOM OF MIGRAINE. MAY TAKE AGAIN 2 HOURS LATER IF MIGRAINE PERSISTS, Disp: 10 tablet, Rfl: 2    vilazodone (Viibryd) 40 MG tablet tablet, Take 1 tablet by mouth Daily., Disp: 90 tablet, Rfl: 1    zolpidem (AMBIEN) 5 MG tablet, Take 1 tablet by mouth At Night As Needed for Sleep. Indications: Trouble Sleeping, Disp: 30 tablet, Rfl: 0    Atogepant (Qulipta) 60 MG tablet, Take 1 tablet by mouth Daily., Disp: 8 tablet, Rfl: 0    Allergies:   Allergies   Allergen Reactions    Erythromycin GI Intolerance and Nausea Only     "Sudanyl [Pseudoephedrine] Other (See Comments)     Facial swelling     Prednisone Other (See Comments)     Hypertension         Objective     Physical Exam:  Vital Signs:   Vitals:    03/26/24 1116   BP: 124/82   BP Location: Right arm   Patient Position: Sitting   Cuff Size: Adult   Pulse: 91   Temp: 96.8 °F (36 °C)   SpO2: 98%   Weight: 85.4 kg (188 lb 3.2 oz)   Height: 154.9 cm (60.98\")   PainSc: 0-No pain     BMI: Body mass index is 35.58 kg/m².  Neck Circumference: 14inch     Physical Exam  Vitals and nursing note reviewed.   Constitutional:       General: She is not in acute distress.     Appearance: Normal appearance. She is well-developed. She is obese. She is not diaphoretic.   HENT:      Head: Normocephalic and atraumatic.      Comments: Mallampati 4  Eyes:      Extraocular Movements: Extraocular movements intact.      Conjunctiva/sclera: Conjunctivae normal.      Pupils: Pupils are equal, round, and reactive to light.   Pulmonary:      Effort: Pulmonary effort is normal. No respiratory distress.   Musculoskeletal:         General: Normal range of motion.   Skin:     General: Skin is warm and dry.   Neurological:      General: No focal deficit present.      Mental Status: She is alert and oriented to person, place, and time.      Cranial Nerves: Cranial nerves 2-12 are intact.      Sensory: Sensation is intact.      Motor: Motor function is intact.      Coordination: Coordination is intact.      Gait: Gait is intact.   Psychiatric:         Mood and Affect: Mood normal.         Behavior: Behavior normal.         Thought Content: Thought content normal.         Judgment: Judgment normal.         Assessment / Plan      Assessment/Plan:   Diagnoses and all orders for this visit:    1. JIMMY on CPAP (Primary)  -     Polysomnography 4 or More Parameters; Future    2. Migraine without aura and without status migrainosus, not intractable  -     Polysomnography 4 or More Parameters; Future    3. Hypertension, " unspecified type  -     Polysomnography 4 or More Parameters; Future    4. Other hyperlipidemia  -     Polysomnography 4 or More Parameters; Future    5. Intractable chronic migraine without aura and without status migrainosus    6. BMI 35.0-35.9,adult  -     Polysomnography 4 or More Parameters; Future    Other orders  -     Atogepant (Qulipta) 60 MG tablet; Take 1 tablet by mouth Daily.  Dispense: 8 tablet; Refill: 0    *Patient education on JIMMY and Migraine disorder provided today.   *Encouraged weight loss with a BMI goal of 24.   *Advised patient to avoid driving if drowsy.    *She is agreeable to a PSG.   *Indications and possible SEs of Atogepant discussed with patient. Samples provided.     Follow Up:   Return in about 10 weeks (around 6/4/2024) for Follow Up.    I have advised the patient the need to continue the use of CPAP.  Gold standard for treatment of sleep apnea includes weight loss, use of cpap and avoidance of alcohol.  Untreated JIMMY may increase the risk for development of hypertension, stroke, myocardial infarction, diabetes, cardiovascular disease, work-related issues and driving accidents. I have counseled and advised the patient to avoid driving or operating heavy/dangerous equipment if feeling drowsy.     ZEHRA Elaine, FNP-C  [facility]

## 2024-04-04 ENCOUNTER — PATIENT ROUNDING (BHMG ONLY) (OUTPATIENT)
Dept: NEUROLOGY | Facility: CLINIC | Age: 59
End: 2024-04-04
Payer: COMMERCIAL

## 2024-04-04 ENCOUNTER — TELEPHONE (OUTPATIENT)
Dept: NEUROLOGY | Facility: CLINIC | Age: 59
End: 2024-04-04
Payer: COMMERCIAL

## 2024-04-04 RX ORDER — ATOGEPANT 60 MG/1
60 TABLET ORAL DAILY
Qty: 8 TABLET | Refills: 0 | COMMUNITY
Start: 2024-04-04

## 2024-04-04 NOTE — TELEPHONE ENCOUNTER
I spoke with patient through [employer] she said the medication is working well for her.     Will you send in the medication to the pharmacy?     I told patient to stop by the office tomorrow 04/05/2024 to  samples until it was sent in and we got the pa approved.

## 2024-04-04 NOTE — TELEPHONE ENCOUNTER
WE HAVE NOT RECEIVED ANYTHING STATING A PA WAS NEEDED.     I ALSO DON'T SEE THAT THE PROVIDER GAVE PATIENT A PRESCRIPTION FOR QULIPTA.

## 2024-04-04 NOTE — TELEPHONE ENCOUNTER
Caller: DONTA KHAN     Relationship:PATIENT     Callback number: [phone]    Is it ok to leave a message: [x] Yes [] No    Requested medication for samples: QULIPTA    How much medication does the patient currently have left: NONE    Who will be picking up the samples: PATIENT    Do you need information about patient financial assistance for this medication: [x] Yes [] No    Additional details provided: PATIENT IS F/U ON RX /PRIOR AUTH APPROVAL FOR QULIPTA.     SHE IS ALSO REQUESTING SAMPLES UNTIL RX CAN BE FILLED.     PLEASE CALL W/ STATUS UPDATE

## 2024-04-08 DIAGNOSIS — G43.009 MIGRAINE WITHOUT AURA AND WITHOUT STATUS MIGRAINOSUS, NOT INTRACTABLE: Primary | ICD-10-CM

## 2024-04-11 ENCOUNTER — HOSPITAL ENCOUNTER (OUTPATIENT)
Dept: SLEEP MEDICINE | Facility: HOSPITAL | Age: 59
End: 2024-04-11
Payer: COMMERCIAL

## 2024-04-11 DIAGNOSIS — I10 HYPERTENSION, UNSPECIFIED TYPE: ICD-10-CM

## 2024-04-11 DIAGNOSIS — G47.33 OSA ON CPAP: ICD-10-CM

## 2024-04-11 DIAGNOSIS — E78.49 OTHER HYPERLIPIDEMIA: ICD-10-CM

## 2024-04-11 DIAGNOSIS — G43.009 MIGRAINE WITHOUT AURA AND WITHOUT STATUS MIGRAINOSUS, NOT INTRACTABLE: ICD-10-CM

## 2024-04-11 PROCEDURE — 95810 POLYSOM 6/> YRS 4/> PARAM: CPT

## 2024-04-15 DIAGNOSIS — F51.04 PSYCHOPHYSIOLOGIC INSOMNIA: ICD-10-CM

## 2024-04-15 DIAGNOSIS — M62.838 MUSCLE SPASM OF RIGHT SHOULDER: ICD-10-CM

## 2024-04-16 RX ORDER — ZOLPIDEM TARTRATE 5 MG/1
5 TABLET ORAL NIGHTLY PRN
Qty: 30 TABLET | Refills: 0 | Status: SHIPPED | OUTPATIENT
Start: 2024-04-16

## 2024-04-16 RX ORDER — CYCLOBENZAPRINE HCL 10 MG
10 TABLET ORAL 2 TIMES DAILY PRN
Qty: 30 TABLET | Refills: 1 | Status: SHIPPED | OUTPATIENT
Start: 2024-04-16

## 2024-04-16 NOTE — TELEPHONE ENCOUNTER
Rx Refill Note  Requested Prescriptions     Pending Prescriptions Disp Refills    zolpidem (AMBIEN) 5 MG tablet 30 tablet 0     Sig: Take 1 tablet by mouth At Night As Needed for Sleep. Indications: Trouble Sleeping    cyclobenzaprine (FLEXERIL) 10 MG tablet 30 tablet 1     Sig: Take 1 tablet by mouth 2 (Two) Times a Day As Needed for Muscle Spasms.      Last office visit with prescribing clinician: 1/29/2024   Last telemedicine visit with prescribing clinician: Visit date not found   Next office visit with prescribing clinician: 6/24/2024                         Would you like a call back once the refill request has been completed: [] Yes [] No    If the office needs to give you a call back, can they leave a voicemail: [] Yes [] No    Sandra Saucedo MA  04/16/24, 07:35 EDT

## 2024-04-22 RX ORDER — ATOGEPANT 60 MG/1
60 TABLET ORAL DAILY
Qty: 8 TABLET | Refills: 0 | COMMUNITY
Start: 2024-04-22 | End: 2024-04-29

## 2024-04-29 ENCOUNTER — OFFICE VISIT (OUTPATIENT)
Dept: INTERNAL MEDICINE | Facility: CLINIC | Age: 59
End: 2024-04-29
Payer: COMMERCIAL

## 2024-04-29 ENCOUNTER — TELEPHONE (OUTPATIENT)
Dept: NEUROLOGY | Facility: CLINIC | Age: 59
End: 2024-04-29
Payer: COMMERCIAL

## 2024-04-29 ENCOUNTER — HOSPITAL ENCOUNTER (OUTPATIENT)
Dept: GENERAL RADIOLOGY | Facility: HOSPITAL | Age: 59
Discharge: HOME | End: 2024-04-29
Admitting: NURSE PRACTITIONER
Payer: COMMERCIAL

## 2024-04-29 VITALS
SYSTOLIC BLOOD PRESSURE: 112 MMHG | HEIGHT: 61 IN | WEIGHT: 183 LBS | HEART RATE: 101 BPM | BODY MASS INDEX: 34.55 KG/M2 | TEMPERATURE: 98.2 F | DIASTOLIC BLOOD PRESSURE: 76 MMHG | OXYGEN SATURATION: 98 %

## 2024-04-29 DIAGNOSIS — K64.9 HEMORRHOIDS, UNSPECIFIED HEMORRHOID TYPE: ICD-10-CM

## 2024-04-29 DIAGNOSIS — E78.5 HYPERLIPIDEMIA, UNSPECIFIED HYPERLIPIDEMIA TYPE: ICD-10-CM

## 2024-04-29 DIAGNOSIS — E03.9 HYPOTHYROIDISM, UNSPECIFIED TYPE: ICD-10-CM

## 2024-04-29 DIAGNOSIS — M25.551 RIGHT HIP PAIN: ICD-10-CM

## 2024-04-29 DIAGNOSIS — E11.9 TYPE 2 DIABETES MELLITUS WITHOUT COMPLICATION, WITHOUT LONG-TERM CURRENT USE OF INSULIN: ICD-10-CM

## 2024-04-29 DIAGNOSIS — J45.20 MILD INTERMITTENT ASTHMA WITHOUT COMPLICATION: ICD-10-CM

## 2024-04-29 DIAGNOSIS — I10 PRIMARY HYPERTENSION: ICD-10-CM

## 2024-04-29 DIAGNOSIS — K62.89 RECTAL IRRITATION: ICD-10-CM

## 2024-04-29 DIAGNOSIS — F43.21 GRIEF: ICD-10-CM

## 2024-04-29 DIAGNOSIS — R10.11 RUQ PAIN: Primary | ICD-10-CM

## 2024-04-29 PROCEDURE — 3044F HG A1C LEVEL LT 7.0%: CPT | Performed by: NURSE PRACTITIONER

## 2024-04-29 PROCEDURE — 1126F AMNT PAIN NOTED NONE PRSNT: CPT | Performed by: NURSE PRACTITIONER

## 2024-04-29 PROCEDURE — 3078F DIAST BP <80 MM HG: CPT | Performed by: NURSE PRACTITIONER

## 2024-04-29 PROCEDURE — 1160F RVW MEDS BY RX/DR IN RCRD: CPT | Performed by: NURSE PRACTITIONER

## 2024-04-29 PROCEDURE — 1159F MED LIST DOCD IN RCRD: CPT | Performed by: NURSE PRACTITIONER

## 2024-04-29 PROCEDURE — 3074F SYST BP LT 130 MM HG: CPT | Performed by: NURSE PRACTITIONER

## 2024-04-29 PROCEDURE — 73502 X-RAY EXAM HIP UNI 2-3 VIEWS: CPT

## 2024-04-29 PROCEDURE — 99214 OFFICE O/P EST MOD 30 MIN: CPT | Performed by: NURSE PRACTITIONER

## 2024-04-29 RX ORDER — ATOGEPANT 60 MG/1
60 TABLET ORAL DAILY
Qty: 12 TABLET | Refills: 0 | COMMUNITY
Start: 2024-04-29
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
## 2024-04-29 NOTE — TELEPHONE ENCOUNTER
Didn't she already come and get these? Or didn't I at least respond to another message about this today?

## 2024-04-29 NOTE — TELEPHONE ENCOUNTER
Provider: DREA KNOWLES    Caller: PATIENT    Relationship to Patient: SELF    Phone Number: [phone]    Reason for Call: PATIENT WOULD LIKE TO SEE IF SHE CAN  QULIPTA SAMPLES TODAY. STATES SHE HAS A DR APPT IN THE AREA AT 1 PM AND WOULD LIKE TO PICK THEM UP AFTER IF POSSIBLE. PLEASE ADVISE, THANK YOU.

## 2024-05-07 ENCOUNTER — TELEPHONE (OUTPATIENT)
Dept: NEUROLOGY | Facility: CLINIC | Age: 59
End: 2024-05-07
Payer: COMMERCIAL

## 2024-05-08 RX ORDER — ATOGEPANT 60 MG/1
60 TABLET ORAL DAILY
Qty: 12 TABLET | Refills: 0 | COMMUNITY
Start: 2024-05-08

## 2024-05-12 DIAGNOSIS — E03.9 ACQUIRED HYPOTHYROIDISM: ICD-10-CM

## 2024-05-12 DIAGNOSIS — F51.04 PSYCHOPHYSIOLOGIC INSOMNIA: ICD-10-CM

## 2024-05-13 RX ORDER — ATORVASTATIN CALCIUM 40 MG/1
40 TABLET, FILM COATED ORAL DAILY
Qty: 90 TABLET | Refills: 0 | Status: SHIPPED | OUTPATIENT
Start: 2024-05-13

## 2024-05-13 RX ORDER — ARIPIPRAZOLE 2 MG/1
2 TABLET ORAL DAILY
Qty: 90 TABLET | Refills: 1 | Status: SHIPPED | OUTPATIENT
Start: 2024-05-13

## 2024-05-13 RX ORDER — LEVOTHYROXINE SODIUM 0.05 MG/1
50 TABLET ORAL DAILY
Qty: 90 TABLET | Refills: 0 | Status: SHIPPED | OUTPATIENT
Start: 2024-05-13

## 2024-05-13 RX ORDER — LOSARTAN POTASSIUM AND HYDROCHLOROTHIAZIDE 25; 100 MG/1; MG/1
1 TABLET ORAL DAILY
Qty: 90 TABLET | Refills: 1 | Status: SHIPPED | OUTPATIENT
Start: 2024-05-13

## 2024-05-13 RX ORDER — POTASSIUM CHLORIDE 750 MG/1
10 CAPSULE, EXTENDED RELEASE ORAL DAILY
Qty: 90 CAPSULE | Refills: 1 | Status: SHIPPED | OUTPATIENT
Start: 2024-05-13

## 2024-05-13 RX ORDER — TRAZODONE HYDROCHLORIDE 100 MG/1
200 TABLET ORAL NIGHTLY
Qty: 180 TABLET | Refills: 1 | Status: SHIPPED | OUTPATIENT
Start: 2024-05-13

## 2024-05-14 DIAGNOSIS — G43.009 MIGRAINE WITHOUT AURA AND WITHOUT STATUS MIGRAINOSUS, NOT INTRACTABLE: ICD-10-CM

## 2024-05-14 NOTE — TELEPHONE ENCOUNTER
"NEEDING A PRIOR AUTHORIZATION!!    Caller: TimRobina \"SHAKEEL\"    Relationship: Self    Best call back number: [phone]     Requested Prescriptions:   Requested Prescriptions     Pending Prescriptions Disp Refills    ubrogepant (Ubrelvy) 100 MG tablet 10 tablet 2     Sig: TAKE 1 TABLET WITH FIRST SYMPTOM OF MIGRAINE. MAY TAKE AGAIN 2 HOURS LATER IF MIGRAINE PERSISTS        Pharmacy where request should be sent: [facility]     Last office visit with prescribing clinician: 4/29/2024   Last telemedicine visit with prescribing clinician: Visit date not found   Next office visit with prescribing clinician: 6/24/2024     Additional details provided by patient:   NEEDING A PRIOR AUTHORIZATION!!    PATIENT IS OUT- PLEASE CALL IF WE HAVE ANY SAMPLES AVAILABLE FOR PATIENT TO - PATIENT WILL BE GOING OUT OF TOWN ON THURSDAY AND WOULD LIKE TO BE ABLE TO TAKE THEM WITH HER!!!       Does the patient have less than a 3 day supply:  [x] Yes  [] No    Would you like a call back once the refill request has been completed: [x] Yes [] No    If the office needs to give you a call back, can they leave a voicemail: [x] Yes [] No    Johnson Tinajero Rep   05/14/24 11:07 EDT     "

## 2024-05-16 ENCOUNTER — PRIOR AUTHORIZATION (OUTPATIENT)
Dept: INTERNAL MEDICINE | Facility: CLINIC | Age: 59
End: 2024-05-16
Payer: COMMERCIAL

## 2024-05-17 ENCOUNTER — PRIOR AUTHORIZATION (OUTPATIENT)
Dept: INTERNAL MEDICINE | Facility: CLINIC | Age: 59
End: 2024-05-17
Payer: COMMERCIAL

## 2024-05-30 ENCOUNTER — TELEPHONE (OUTPATIENT)
Dept: NEUROLOGY | Facility: CLINIC | Age: 59
End: 2024-05-30

## 2024-05-30 NOTE — TELEPHONE ENCOUNTER
"  Caller: Robina Dumont \"SHAKEEL\"    Relationship: Self    Best call back number: [phone]  What was the call regarding: PATIENT HAS ENOUGH OF HER SAMPLES OF QULIPTA TO LAST UNTIL TOMORROW AND SHE WILL BE OUT. SHE IS ASKING IF SHE CAN HAVE MORE TO COVER UNTIL THE PA FOR THE QULIPTA HAS BEEN APPROVED. SHE HAS NOT HEARD ANY UPDATES ON THE STATUS OF THE PA AND WANTS TO KNOW IF THERE IS AN UPDATE ALSO.     [facility]   Is it okay if the provider responds through MyChart: YES    PLEASE REVIEW  THANK YOU   "

## 2024-05-31 ENCOUNTER — HOSPITAL ENCOUNTER (OUTPATIENT)
Dept: ULTRASOUND IMAGING | Facility: HOSPITAL | Age: 59
Discharge: HOME | End: 2024-05-31
Payer: COMMERCIAL

## 2024-05-31 DIAGNOSIS — R10.11 RUQ PAIN: ICD-10-CM

## 2024-05-31 PROCEDURE — 76705 ECHO EXAM OF ABDOMEN: CPT

## 2024-05-31 NOTE — TELEPHONE ENCOUNTER
CONTACTED INSURANCE COMPANY TO CHECK ON APPEAL.     PER INSURANCE APPEAL WAS DENIED. THEY ARE FAXING A COPY OF THAT DENIAL.      PLEASE ADVISE ON NEXT STEPS.     PATIENT IS SCHEDULED FOR A FOLLOW UP APPT ON 06/04/2024.

## 2024-05-31 NOTE — PROGRESS NOTES
Fatty liver infiltration seen on abdominal US, as well as non-shadowing structures in the gallbladder that are likely polyps (benign).  There is one structure that might be a gallstone stuck to the wall of her gallbladder.  Will order HIDA scan if she agrees.    For fatty liver, eat a heart healthy, low carb diet.  Be physically active most days of the week.  We will continue to monitor liver enzymes w/labs.

## 2024-06-03 DIAGNOSIS — R10.11 RUQ PAIN: Primary | ICD-10-CM

## 2024-06-04 ENCOUNTER — OFFICE VISIT (OUTPATIENT)
Dept: NEUROLOGY | Facility: CLINIC | Age: 59
End: 2024-06-04
Payer: COMMERCIAL

## 2024-06-04 VITALS
OXYGEN SATURATION: 96 % | SYSTOLIC BLOOD PRESSURE: 112 MMHG | WEIGHT: 186 LBS | HEART RATE: 79 BPM | TEMPERATURE: 96.9 F | BODY MASS INDEX: 35.12 KG/M2 | DIASTOLIC BLOOD PRESSURE: 82 MMHG | HEIGHT: 61 IN

## 2024-06-04 DIAGNOSIS — G43.719 INTRACTABLE CHRONIC MIGRAINE WITHOUT AURA AND WITHOUT STATUS MIGRAINOSUS: Primary | ICD-10-CM

## 2024-06-04 DIAGNOSIS — G47.33 OSA ON CPAP: ICD-10-CM

## 2024-06-04 DIAGNOSIS — G43.009 MIGRAINE WITHOUT AURA AND WITHOUT STATUS MIGRAINOSUS, NOT INTRACTABLE: ICD-10-CM

## 2024-06-04 PROCEDURE — 1159F MED LIST DOCD IN RCRD: CPT | Performed by: NURSE PRACTITIONER

## 2024-06-04 PROCEDURE — 1160F RVW MEDS BY RX/DR IN RCRD: CPT | Performed by: NURSE PRACTITIONER

## 2024-06-04 PROCEDURE — 3074F SYST BP LT 130 MM HG: CPT | Performed by: NURSE PRACTITIONER

## 2024-06-04 PROCEDURE — 96372 THER/PROPH/DIAG INJ SC/IM: CPT | Performed by: NURSE PRACTITIONER

## 2024-06-04 PROCEDURE — 3079F DIAST BP 80-89 MM HG: CPT | Performed by: NURSE PRACTITIONER

## 2024-06-04 PROCEDURE — 99214 OFFICE O/P EST MOD 30 MIN: CPT | Performed by: NURSE PRACTITIONER

## 2024-06-04 NOTE — PROGRESS NOTES
"     Follow up Sleep Patient Office Visit      Patient Name: Robina Dumont  : 1965   MRN: [id number]     Referring Physician: No ref. provider found    Chief Complaint:    Chief Complaint   Patient presents with    Follow-up     Patient in office to follow up on jimmy.          History of Present Illness: Robina Dumont is a 59 y.o. female who is here today to follow up with JIMMY and was last seen on 3/26/2024. Currently on CPAP 13cm, compliance 93%, AHI 2/hour, average time in large leak per day 8 minutes and 43 seconds.  She is tolerating her pressure well.  She does feel better when using CPAP therapy- sleeps better \"When I actually sleep\".  Says she is still snoring at times.    *PSG on 2024 did not show evidence of significant JIMMY with an AHI of 4/hour- the study also showed poor sleep efficiency but she feels she slept very well during the study.   *DME company- [employer]  *Mask- Nasal     Migraine disorder- Prescribed Atogepant (at previous and it does help with her migraines and she has had 90% improvement in her migraines with this- only 2 migraines since previous visit) visit but insurance will not cover that (appeal denied also).  Insurance company wants her to try Aimovig, Emgality, Ajovy, or Nurtec.     Pertinent Medical History:   Current compliance report reviewed and has been scanned into the patient's medical record.    Following taken from previous visit note:  Robina Dumont is a 59 y.o. female who is here today to establish care with Sleep Medicine for JIMMY- currently on CPAP therapy and was using [employer].  She was initially diagnosed with significant JIMMY about 20 years ago.  Currently on CPAP therapy.  She also mentions she has lost a significant amount of weight since her previous sleep study and would like to know if she still needs to be on CPAP therapy.  She takes Ambien 5mg QHS PRN, Melatonin QHS PRN, Trazodone 200mg QHS, and CBD gummies for sleep nightly and those " do help.  She has had insomnia (difficulty falling asleep) since she was a kid.  Additional risk factors- BMI 35, HTN, dyslipidemia, diabetes, insomnia, migraine disorder, GERD, hypothyroidism.   *Current compliance report requested for review from Psychiatric.      Migraine disorder- has had migraines since she was a child- around onset of menses; currently taking Excedrin OTC PRN, Tylenol OTC PRN and Ubrelvy 100mg PRN (taking 10 per month); has had 15-20/30 headache days in the past month with about 10-12 being migraines; migraines associated with photophobia, nausea, phonophobia and last more than 4 hours.   *Previously tried Topiramate, Amitriptyline, Propranolol, Sumatriptan for migraines.      Iaeger Score: 5     Subjective      Review of Systems:   Review of Systems   Neurological:  Positive for headache.       Medications:     Current Outpatient Medications:     acetaminophen (TYLENOL) 500 MG tablet, Take 2 tablets by mouth Every 8 (Eight) Hours x1 week, then take as needed., Disp: 42 tablet, Rfl: 0    Advair Diskus 500-50 MCG/ACT DISKUS, Inhale 1 puff by mouth 2 (Two) Times a Day., Disp: 180 each, Rfl: 3    albuterol sulfate  (90 Base) MCG/ACT inhaler, Inhale 2 puffs Every 6 (Six) Hours As Needed for Wheezing or Shortness of Air., Disp: 8.5 g, Rfl: 11    amLODIPine (NORVASC) 2.5 MG tablet, Take 1 tablet by mouth Daily., Disp: 90 tablet, Rfl: 3    ARIPiprazole (ABILIFY) 2 MG tablet, Take 1 tablet by mouth Daily., Disp: 90 tablet, Rfl: 1    Atogepant (Qulipta) 60 MG tablet, Take 1 tablet by mouth Daily., Disp: 8 tablet, Rfl: 0    Atogepant (Qulipta) 60 MG tablet, Take 1 tablet by mouth Daily., Disp: 12 tablet, Rfl: 0    Atogepant (Qulipta) 60 MG tablet, Take 1 tablet by mouth Daily., Disp: 12 tablet, Rfl: 0    atorvastatin (LIPITOR) 40 MG tablet, Take 1 tablet by mouth Daily., Disp: 90 tablet, Rfl: 0    CBD oil (cannabidiol) capsule, Take  by mouth., Disp: , Rfl:     cetirizine (zyrTEC) 10 MG tablet,  Take 1 tablet by mouth Daily., Disp: , Rfl:     cholestyramine light 4 g powder, Dissolve 1 scoop of powder and drink by mouth 2 (Two) Times a Day As Needed for diarrhea, Disp: 231 g, Rfl: 0    cyclobenzaprine (FLEXERIL) 10 MG tablet, Take 1 tablet by mouth 2 (Two) Times a Day As Needed for Muscle Spasms., Disp: 30 tablet, Rfl: 1    cycloSPORINE (Restasis) 0.05 % ophthalmic emulsion, Apply 1 drop to eye(s) as directed by provider Every 12 (Twelve) Hours., Disp: , Rfl:     DULoxetine (CYMBALTA) 60 MG capsule, Take 1 capsule by mouth Daily., Disp: 90 capsule, Rfl: 3    Fezolinetant (VEOZAH) 45 MG tablet, Take 1 tablet by mouth Daily., Disp: 30 tablet, Rfl: 11    Insulin Pen Needle (Pen Needles) 31G X 6 MM misc, 1 application Daily., Disp: 90 each, Rfl: 3    levothyroxine (SYNTHROID, LEVOTHROID) 50 MCG tablet, Take 1 tablet by mouth Daily., Disp: 90 tablet, Rfl: 0    Liraglutide (VICTOZA) 18 MG/3ML solution pen-injector injection, Inject 1.8 mg under the skin into the appropriate area as directed Daily., Disp: 27 mL, Rfl: 3    losartan-hydrochlorothiazide (HYZAAR) 100-25 MG per tablet, Take 1 tablet by mouth Daily., Disp: 90 tablet, Rfl: 1    Melatonin 10 MG tablet, Take 3 tablets by mouth Every Night., Disp: , Rfl:     metFORMIN (GLUCOPHAGE) 1000 MG tablet, Take 1 tablet by mouth 2 (Two) Times a Day With Meals., Disp: 180 tablet, Rfl: 1    montelukast (SINGULAIR) 10 MG tablet, Take 1 tablet by mouth Daily., Disp: 90 tablet, Rfl: 3    olopatadine (PATANOL) 0.1 % ophthalmic solution, Apply 1 drop to eye(s) as directed by provider Every 12 (Twelve) Hours., Disp: , Rfl:     omeprazole (priLOSEC) 20 MG capsule, Take 1 capsule by mouth As Needed., Disp: , Rfl:     ondansetron (Zofran) 4 MG tablet, Take 1 tablet by mouth Every 8 (Eight) Hours As Needed for Nausea or Vomiting., Disp: 30 tablet, Rfl: 0    potassium chloride (MICRO-K) 10 MEQ CR capsule, Take 1 capsule by mouth Daily., Disp: 90 capsule, Rfl: 1    traZODone  "(DESYREL) 100 MG tablet, Take 2 tablets by mouth Every Night., Disp: 180 tablet, Rfl: 1    ubrogepant (Ubrelvy) 100 MG tablet, TAKE 1 TABLET WITH FIRST SYMPTOM OF MIGRAINE. MAY TAKE AGAIN 2 HOURS LATER IF MIGRAINE PERSISTS, Disp: 10 tablet, Rfl: 2    vilazodone (Viibryd) 40 MG tablet tablet, Take 1 tablet by mouth Daily., Disp: 90 tablet, Rfl: 1    zolpidem (AMBIEN) 5 MG tablet, Take 1 tablet by mouth At Night As Needed for Sleep. Indications: Trouble Sleeping, Disp: 30 tablet, Rfl: 0    galcanezumab-gnlm (EMGALITY) 120 MG/ML auto-injector pen, Inject 1 mL under the skin into the appropriate area as directed Every 30 (Thirty) Days., Disp: 1 mL, Rfl: 2    Allergies:   Allergies   Allergen Reactions    Erythromycin GI Intolerance and Nausea Only    Sudanyl [Pseudoephedrine] Other (See Comments)     Facial swelling     Prednisone Other (See Comments)     Hypertension         Objective     Physical Exam:  Vital Signs:   Vitals:    06/04/24 1110   BP: 112/82   BP Location: Right arm   Patient Position: Sitting   Cuff Size: Adult   Pulse: 79   Temp: 96.9 °F (36.1 °C)   SpO2: 96%   Weight: 84.4 kg (186 lb)   Height: 154.9 cm (60.98\")   PainSc: 0-No pain     BMI: Body mass index is 35.17 kg/m².    Physical Exam  Vitals and nursing note reviewed.   Constitutional:       General: She is not in acute distress.     Appearance: Normal appearance. She is well-developed. She is obese. She is not diaphoretic.   HENT:      Head: Normocephalic and atraumatic.   Eyes:      Extraocular Movements: Extraocular movements intact.      Conjunctiva/sclera: Conjunctivae normal.   Pulmonary:      Effort: Pulmonary effort is normal. No respiratory distress.   Musculoskeletal:         General: Normal range of motion.   Skin:     General: Skin is dry.   Neurological:      Mental Status: She is alert and oriented to person, place, and time.   Psychiatric:         Mood and Affect: Mood normal.         Behavior: Behavior normal.         Thought " Content: Thought content normal.         Judgment: Judgment normal.         Assessment / Plan      Assessment/Plan:   Diagnoses and all orders for this visit:    1. Intractable chronic migraine without aura and without status migrainosus (Primary)  -     galcanezumab-gnlm (EMGALITY) 120 MG/ML auto-injector pen; Inject 1 mL under the skin into the appropriate area as directed Every 30 (Thirty) Days.  Dispense: 1 mL; Refill: 2    2. Migraine without aura and without status migrainosus, not intractable    3. JIMMY on CPAP    4. BMI 35.0-35.9,adult    *I have advised patient to try a chin strap or full face mask due to mask leak with snoring.   *Indications and possible SEs of Emgality discussed. Sample dose given today.      Follow Up:   Return in about 11 weeks (around 8/20/2024) for Follow Up.    *Order for PAP supplies sent to patient's [employer] company.     I have advised the patient the need to continue the use of CPAP.  Gold standard for treatment of sleep apnea includes weight loss, use of cpap and avoidance of alcohol.  Encouraged weight loss (if applicable) with a BMI goal of 24.  Untreated JIMMY may increase the risk for development of hypertension, stroke, myocardial infarction, diabetes, cardiovascular disease, work-related issues and driving accidents. I have counseled and advised the patient to avoid driving or operating heavy/dangerous equipment if feeling drowsy.     ZEHRA Elaine, FNP-C  [facility]

## 2024-06-06 ENCOUNTER — APPOINTMENT (OUTPATIENT)
Dept: GENERAL RADIOLOGY | Facility: HOSPITAL | Age: 59
End: 2024-06-06
Payer: OTHER MISCELLANEOUS

## 2024-06-06 ENCOUNTER — APPOINTMENT (OUTPATIENT)
Dept: CT IMAGING | Facility: HOSPITAL | Age: 59
End: 2024-06-06
Payer: OTHER MISCELLANEOUS

## 2024-06-06 ENCOUNTER — HOSPITAL ENCOUNTER (EMERGENCY)
Facility: HOSPITAL | Age: 59
Discharge: OTHER INSTITUTION | End: 2024-06-06
Attending: STUDENT IN AN ORGANIZED HEALTH CARE EDUCATION/TRAINING PROGRAM
Payer: OTHER MISCELLANEOUS

## 2024-06-06 VITALS
OXYGEN SATURATION: 100 % | WEIGHT: 185 LBS | TEMPERATURE: 98.5 F | HEIGHT: 61 IN | SYSTOLIC BLOOD PRESSURE: 94 MMHG | BODY MASS INDEX: 34.93 KG/M2 | RESPIRATION RATE: 20 BRPM | HEART RATE: 102 BPM | DIASTOLIC BLOOD PRESSURE: 81 MMHG

## 2024-06-06 DIAGNOSIS — R58 HYPOTENSION DUE TO BLOOD LOSS: ICD-10-CM

## 2024-06-06 DIAGNOSIS — I95.89 HYPOTENSION DUE TO BLOOD LOSS: ICD-10-CM

## 2024-06-06 DIAGNOSIS — S30.1XXA TRAUMATIC HEMATOMA OF ABDOMINAL WALL, INITIAL ENCOUNTER: Primary | ICD-10-CM
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
## 2024-06-06 LAB
ABO GROUP BLD: NORMAL
ABO GROUP BLD: NORMAL
ALBUMIN SERPL-MCNC: 3.8 G/DL (ref 3.5–5.2)
ALBUMIN/GLOB SERPL: 1.5 G/DL
ALP SERPL-CCNC: 54 U/L (ref 39–117)
ALT SERPL W P-5'-P-CCNC: 22 U/L (ref 1–33)
ANION GAP SERPL CALCULATED.3IONS-SCNC: 16.4 MMOL/L (ref 5–15)
AST SERPL-CCNC: 29 U/L (ref 1–32)
BASOPHILS # BLD AUTO: 0.05 10*3/MM3 (ref 0–0.2)
BASOPHILS NFR BLD AUTO: 0.4 % (ref 0–1.5)
BILIRUB SERPL-MCNC: 0.2 MG/DL (ref 0–1.2)
BLD GP AB SCN SERPL QL: NEGATIVE
BUN SERPL-MCNC: 13 MG/DL (ref 6–20)
BUN/CREAT SERPL: 14 (ref 7–25)
CALCIUM SPEC-SCNC: 9.4 MG/DL (ref 8.6–10.5)
CHLORIDE SERPL-SCNC: 100 MMOL/L (ref 98–107)
CO2 SERPL-SCNC: 23.6 MMOL/L (ref 22–29)
CREAT SERPL-MCNC: 0.93 MG/DL (ref 0.57–1)
DEPRECATED RDW RBC AUTO: 42.5 FL (ref 37–54)
EGFRCR SERPLBLD CKD-EPI 2021: 70.9 ML/MIN/1.73
EOSINOPHIL # BLD AUTO: 0.05 10*3/MM3 (ref 0–0.4)
EOSINOPHIL NFR BLD AUTO: 0.4 % (ref 0.3–6.2)
ERYTHROCYTE [DISTWIDTH] IN BLOOD BY AUTOMATED COUNT: 13.4 % (ref 12.3–15.4)
GLOBULIN UR ELPH-MCNC: 2.6 GM/DL
GLUCOSE SERPL-MCNC: 147 MG/DL (ref 65–99)
HCT VFR BLD AUTO: 25.8 % (ref 34–46.6)
HCT VFR BLD AUTO: 30.8 % (ref 34–46.6)
HGB BLD-MCNC: 10.2 G/DL (ref 12–15.9)
HGB BLD-MCNC: 8.1 G/DL (ref 12–15.9)
IMM GRANULOCYTES # BLD AUTO: 0.09 10*3/MM3 (ref 0–0.05)
IMM GRANULOCYTES NFR BLD AUTO: 0.6 % (ref 0–0.5)
INR PPP: 0.97 (ref 0.9–1.1)
LYMPHOCYTES # BLD AUTO: 3.1 10*3/MM3 (ref 0.7–3.1)
LYMPHOCYTES NFR BLD AUTO: 22.3 % (ref 19.6–45.3)
MCH RBC QN AUTO: 29.1 PG (ref 26.6–33)
MCHC RBC AUTO-ENTMCNC: 33.1 G/DL (ref 31.5–35.7)
MCV RBC AUTO: 87.7 FL (ref 79–97)
MONOCYTES # BLD AUTO: 0.73 10*3/MM3 (ref 0.1–0.9)
MONOCYTES NFR BLD AUTO: 5.3 % (ref 5–12)
NEUTROPHILS NFR BLD AUTO: 71 % (ref 42.7–76)
NEUTROPHILS NFR BLD AUTO: 9.86 10*3/MM3 (ref 1.7–7)
NRBC BLD AUTO-RTO: 0 /100 WBC (ref 0–0.2)
PLATELET # BLD AUTO: 350 10*3/MM3 (ref 140–450)
PMV BLD AUTO: 9.9 FL (ref 6–12)
POTASSIUM SERPL-SCNC: 3.8 MMOL/L (ref 3.5–5.2)
PROT SERPL-MCNC: 6.4 G/DL (ref 6–8.5)
PROTHROMBIN TIME: 13.3 SECONDS (ref 12.3–15.1)
RBC # BLD AUTO: 3.51 10*6/MM3 (ref 3.77–5.28)
RH BLD: POSITIVE
RH BLD: POSITIVE
SODIUM SERPL-SCNC: 140 MMOL/L (ref 136–145)
T&S EXPIRATION DATE: NORMAL
WBC NRBC COR # BLD AUTO: 13.88 10*3/MM3 (ref 3.4–10.8)

## 2024-06-06 PROCEDURE — 25010000002 MORPHINE PER 10 MG: Performed by: STUDENT IN AN ORGANIZED HEALTH CARE EDUCATION/TRAINING PROGRAM

## 2024-06-06 PROCEDURE — 86920 COMPATIBILITY TEST SPIN: CPT

## 2024-06-06 PROCEDURE — 86900 BLOOD TYPING SEROLOGIC ABO: CPT

## 2024-06-06 PROCEDURE — 93005 ELECTROCARDIOGRAM TRACING: CPT | Performed by: STUDENT IN AN ORGANIZED HEALTH CARE EDUCATION/TRAINING PROGRAM

## 2024-06-06 PROCEDURE — 25010000002 ONDANSETRON PER 1 MG: Performed by: STUDENT IN AN ORGANIZED HEALTH CARE EDUCATION/TRAINING PROGRAM

## 2024-06-06 PROCEDURE — 99291 CRITICAL CARE FIRST HOUR: CPT

## 2024-06-06 PROCEDURE — 71275 CT ANGIOGRAPHY CHEST: CPT

## 2024-06-06 PROCEDURE — 36430 TRANSFUSION BLD/BLD COMPNT: CPT

## 2024-06-06 PROCEDURE — 73590 X-RAY EXAM OF LOWER LEG: CPT

## 2024-06-06 PROCEDURE — 72125 CT NECK SPINE W/O DYE: CPT

## 2024-06-06 PROCEDURE — 85610 PROTHROMBIN TIME: CPT | Performed by: PHYSICIAN ASSISTANT

## 2024-06-06 PROCEDURE — 25810000003 SODIUM CHLORIDE 0.9 % SOLUTION: Performed by: PHYSICIAN ASSISTANT

## 2024-06-06 PROCEDURE — 74174 CTA ABD&PLVS W/CONTRAST: CPT

## 2024-06-06 PROCEDURE — 86901 BLOOD TYPING SEROLOGIC RH(D): CPT

## 2024-06-06 PROCEDURE — 70450 CT HEAD/BRAIN W/O DYE: CPT

## 2024-06-06 PROCEDURE — 96374 THER/PROPH/DIAG INJ IV PUSH: CPT

## 2024-06-06 PROCEDURE — 86901 BLOOD TYPING SEROLOGIC RH(D): CPT | Performed by: PHYSICIAN ASSISTANT

## 2024-06-06 PROCEDURE — 25810000003 SODIUM CHLORIDE 0.9 % SOLUTION: Performed by: STUDENT IN AN ORGANIZED HEALTH CARE EDUCATION/TRAINING PROGRAM

## 2024-06-06 PROCEDURE — 85014 HEMATOCRIT: CPT | Performed by: PHYSICIAN ASSISTANT

## 2024-06-06 PROCEDURE — 86900 BLOOD TYPING SEROLOGIC ABO: CPT | Performed by: PHYSICIAN ASSISTANT

## 2024-06-06 PROCEDURE — 85018 HEMOGLOBIN: CPT | Performed by: PHYSICIAN ASSISTANT

## 2024-06-06 PROCEDURE — P9016 RBC LEUKOCYTES REDUCED: HCPCS

## 2024-06-06 PROCEDURE — 86850 RBC ANTIBODY SCREEN: CPT | Performed by: PHYSICIAN ASSISTANT

## 2024-06-06 PROCEDURE — 96375 TX/PRO/DX INJ NEW DRUG ADDON: CPT

## 2024-06-06 PROCEDURE — 85025 COMPLETE CBC W/AUTO DIFF WBC: CPT | Performed by: PHYSICIAN ASSISTANT

## 2024-06-06 PROCEDURE — 25510000001 IOPAMIDOL 61 % SOLUTION: Performed by: STUDENT IN AN ORGANIZED HEALTH CARE EDUCATION/TRAINING PROGRAM

## 2024-06-06 PROCEDURE — 73130 X-RAY EXAM OF HAND: CPT

## 2024-06-06 PROCEDURE — 36415 COLL VENOUS BLD VENIPUNCTURE: CPT

## 2024-06-06 PROCEDURE — 80053 COMPREHEN METABOLIC PANEL: CPT | Performed by: PHYSICIAN ASSISTANT

## 2024-06-06 PROCEDURE — 99285 EMERGENCY DEPT VISIT HI MDM: CPT

## 2024-06-06 RX ORDER — ONDANSETRON 2 MG/ML
4 INJECTION INTRAMUSCULAR; INTRAVENOUS ONCE
Status: COMPLETED | OUTPATIENT
Start: 2024-06-06 | End: 2024-06-06

## 2024-06-06 RX ORDER — SODIUM CHLORIDE 0.9 % (FLUSH) 0.9 %
10 SYRINGE (ML) INJECTION AS NEEDED
Status: DISCONTINUED | OUTPATIENT
Start: 2024-06-06 | End: 2024-06-07 | Stop reason: HOSPADM

## 2024-06-06 RX ADMIN — ONDANSETRON 4 MG: 2 INJECTION INTRAMUSCULAR; INTRAVENOUS at 19:24

## 2024-06-06 RX ADMIN — SODIUM CHLORIDE 1000 ML: 9 INJECTION, SOLUTION INTRAVENOUS at 19:36

## 2024-06-06 RX ADMIN — IOPAMIDOL 100 ML: 612 INJECTION, SOLUTION INTRAVENOUS at 20:05

## 2024-06-06 RX ADMIN — SODIUM CHLORIDE 1000 ML: 9 INJECTION, SOLUTION INTRAVENOUS at 19:40

## 2024-06-06 RX ADMIN — MORPHINE SULFATE 4 MG: 4 INJECTION, SOLUTION INTRAMUSCULAR; INTRAVENOUS at 19:25
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
## 2024-06-06 NOTE — ED PROVIDER NOTES
Fast Ultrasound    Date/Time: 6/6/2024 7:43 PM    Performed by: Shaw Haddad MD  Authorized by: Shaw Haddad MD    Procedure details:     Indications: blunt abdominal trauma      Assess for:  Intra-abdominal fluid    Technique:  Abdominal  Abdominal findings:     L kidney:  Visualized    R kidney:  Visualized    Liver:  Visualized    Bladder:  Visualized    Hepatorenal space visualized: identified      Splenorenal space: identified      Rectovesical free fluid: not identified      Splenorenal free fluid: not identified      Pouch of Viet free fluid: not identified    Comments:      Negative FAST exam on my bedside evaluation, patient was growing hematoma to her left thigh but appears outside of the peritoneum.        Shaw Haddad MD  06/06/24 1944

## 2024-06-08 LAB
BH BB BLOOD EXPIRATION DATE: NORMAL
BH BB BLOOD EXPIRATION DATE: NORMAL
BH BB BLOOD TYPE BARCODE: 7300
BH BB BLOOD TYPE BARCODE: 7300
BH BB DISPENSE STATUS: NORMAL
BH BB DISPENSE STATUS: NORMAL
BH BB PRODUCT CODE: NORMAL
BH BB PRODUCT CODE: NORMAL
BH BB UNIT NUMBER: NORMAL
BH BB UNIT NUMBER: NORMAL
CROSSMATCH INTERPRETATION: NORMAL
CROSSMATCH INTERPRETATION: NORMAL
UNIT  ABO: NORMAL
UNIT  ABO: NORMAL
UNIT  RH: NORMAL
UNIT  RH: NORMAL

## 2024-06-09 ENCOUNTER — READMISSION MANAGEMENT (OUTPATIENT)
Dept: CALL CENTER | Facility: HOSPITAL | Age: 59
End: 2024-06-09
Payer: COMMERCIAL

## 2024-06-09 DIAGNOSIS — G43.009 MIGRAINE WITHOUT AURA AND WITHOUT STATUS MIGRAINOSUS, NOT INTRACTABLE: ICD-10-CM
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
## 2024-06-10 ENCOUNTER — TRANSITIONAL CARE MANAGEMENT TELEPHONE ENCOUNTER (OUTPATIENT)
Dept: CALL CENTER | Facility: HOSPITAL | Age: 59
End: 2024-06-10
Payer: COMMERCIAL
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
## 2024-06-11 ENCOUNTER — APPOINTMENT (OUTPATIENT)
Dept: CT IMAGING | Facility: HOSPITAL | Age: 59
End: 2024-06-11
Payer: OTHER MISCELLANEOUS

## 2024-06-11 ENCOUNTER — HOSPITAL ENCOUNTER (EMERGENCY)
Facility: HOSPITAL | Age: 59
Discharge: ANOTHER HOSPITAL | End: 2024-06-11
Attending: EMERGENCY MEDICINE | Admitting: EMERGENCY MEDICINE
Payer: OTHER MISCELLANEOUS

## 2024-06-11 VITALS
DIASTOLIC BLOOD PRESSURE: 68 MMHG | RESPIRATION RATE: 18 BRPM | TEMPERATURE: 98.4 F | BODY MASS INDEX: 34.92 KG/M2 | HEIGHT: 61 IN | SYSTOLIC BLOOD PRESSURE: 112 MMHG | OXYGEN SATURATION: 100 % | WEIGHT: 184.97 LBS | HEART RATE: 101 BPM

## 2024-06-11 DIAGNOSIS — S30.1XXA ABDOMINAL HEMATOMA: ICD-10-CM

## 2024-06-11 DIAGNOSIS — D64.9 ANEMIA, UNSPECIFIED TYPE: Primary | ICD-10-CM

## 2024-06-11 DIAGNOSIS — G43.009 MIGRAINE WITHOUT AURA AND WITHOUT STATUS MIGRAINOSUS, NOT INTRACTABLE: ICD-10-CM

## 2024-06-11 LAB
ALBUMIN SERPL-MCNC: 3.5 G/DL (ref 3.5–5.2)
ALBUMIN/GLOB SERPL: 1.4 G/DL
ALP SERPL-CCNC: 60 U/L (ref 39–117)
ALT SERPL W P-5'-P-CCNC: 13 U/L (ref 1–33)
ANION GAP SERPL CALCULATED.3IONS-SCNC: 14.6 MMOL/L (ref 5–15)
AST SERPL-CCNC: 17 U/L (ref 1–32)
BASOPHILS # BLD AUTO: 0.03 10*3/MM3 (ref 0–0.2)
BASOPHILS NFR BLD AUTO: 0.3 % (ref 0–1.5)
BILIRUB SERPL-MCNC: 0.9 MG/DL (ref 0–1.2)
BILIRUB UR QL STRIP: NEGATIVE
BUN SERPL-MCNC: 14 MG/DL (ref 6–20)
BUN/CREAT SERPL: 17.7 (ref 7–25)
CALCIUM SPEC-SCNC: 8.9 MG/DL (ref 8.6–10.5)
CHLORIDE SERPL-SCNC: 98 MMOL/L (ref 98–107)
CLARITY UR: CLEAR
CO2 SERPL-SCNC: 24.4 MMOL/L (ref 22–29)
COLOR UR: YELLOW
CREAT SERPL-MCNC: 0.79 MG/DL (ref 0.57–1)
D-LACTATE SERPL-SCNC: 2.7 MMOL/L (ref 0.5–2)
D-LACTATE SERPL-SCNC: 4.3 MMOL/L (ref 0.5–2)
DEPRECATED RDW RBC AUTO: 50.4 FL (ref 37–54)
EGFRCR SERPLBLD CKD-EPI 2021: 86.3 ML/MIN/1.73
EOSINOPHIL # BLD AUTO: 0.09 10*3/MM3 (ref 0–0.4)
EOSINOPHIL NFR BLD AUTO: 1 % (ref 0.3–6.2)
ERYTHROCYTE [DISTWIDTH] IN BLOOD BY AUTOMATED COUNT: 16.5 % (ref 12.3–15.4)
GLOBULIN UR ELPH-MCNC: 2.5 GM/DL
GLUCOSE SERPL-MCNC: 106 MG/DL (ref 65–99)
GLUCOSE UR STRIP-MCNC: NEGATIVE MG/DL
HCT VFR BLD AUTO: 22.6 % (ref 34–46.6)
HGB BLD-MCNC: 7.2 G/DL (ref 12–15.9)
HGB UR QL STRIP.AUTO: NEGATIVE
HOLD SPECIMEN: NORMAL
HOLD SPECIMEN: NORMAL
IMM GRANULOCYTES # BLD AUTO: 0.06 10*3/MM3 (ref 0–0.05)
IMM GRANULOCYTES NFR BLD AUTO: 0.7 % (ref 0–0.5)
KETONES UR QL STRIP: NEGATIVE
LEUKOCYTE ESTERASE UR QL STRIP.AUTO: NEGATIVE
LIPASE SERPL-CCNC: 21 U/L (ref 13–60)
LYMPHOCYTES # BLD AUTO: 1.79 10*3/MM3 (ref 0.7–3.1)
LYMPHOCYTES NFR BLD AUTO: 20.1 % (ref 19.6–45.3)
MCH RBC QN AUTO: 27.5 PG (ref 26.6–33)
MCHC RBC AUTO-ENTMCNC: 31.9 G/DL (ref 31.5–35.7)
MCV RBC AUTO: 86.3 FL (ref 79–97)
MONOCYTES # BLD AUTO: 0.86 10*3/MM3 (ref 0.1–0.9)
MONOCYTES NFR BLD AUTO: 9.7 % (ref 5–12)
NEUTROPHILS NFR BLD AUTO: 6.08 10*3/MM3 (ref 1.7–7)
NEUTROPHILS NFR BLD AUTO: 68.2 % (ref 42.7–76)
NITRITE UR QL STRIP: NEGATIVE
NRBC BLD AUTO-RTO: 0 /100 WBC (ref 0–0.2)
PH UR STRIP.AUTO: 5.5 [PH] (ref 5–8)
PLATELET # BLD AUTO: 282 10*3/MM3 (ref 140–450)
PMV BLD AUTO: 9.7 FL (ref 6–12)
POTASSIUM SERPL-SCNC: 3.6 MMOL/L (ref 3.5–5.2)
PROT SERPL-MCNC: 6 G/DL (ref 6–8.5)
PROT UR QL STRIP: NEGATIVE
RBC # BLD AUTO: 2.62 10*6/MM3 (ref 3.77–5.28)
SODIUM SERPL-SCNC: 137 MMOL/L (ref 136–145)
SP GR UR STRIP: 1.01 (ref 1–1.03)
UROBILINOGEN UR QL STRIP: NORMAL
WBC NRBC COR # BLD AUTO: 8.91 10*3/MM3 (ref 3.4–10.8)
WHOLE BLOOD HOLD COAG: NORMAL
WHOLE BLOOD HOLD SPECIMEN: NORMAL

## 2024-06-11 PROCEDURE — 36415 COLL VENOUS BLD VENIPUNCTURE: CPT

## 2024-06-11 PROCEDURE — 83690 ASSAY OF LIPASE: CPT | Performed by: EMERGENCY MEDICINE

## 2024-06-11 PROCEDURE — 80053 COMPREHEN METABOLIC PANEL: CPT | Performed by: EMERGENCY MEDICINE

## 2024-06-11 PROCEDURE — 96374 THER/PROPH/DIAG INJ IV PUSH: CPT

## 2024-06-11 PROCEDURE — 71275 CT ANGIOGRAPHY CHEST: CPT

## 2024-06-11 PROCEDURE — 25010000002 MORPHINE PER 10 MG: Performed by: EMERGENCY MEDICINE

## 2024-06-11 PROCEDURE — 74174 CTA ABD&PLVS W/CONTRAST: CPT

## 2024-06-11 PROCEDURE — 81003 URINALYSIS AUTO W/O SCOPE: CPT | Performed by: EMERGENCY MEDICINE

## 2024-06-11 PROCEDURE — 25510000001 IOPAMIDOL 61 % SOLUTION: Performed by: EMERGENCY MEDICINE

## 2024-06-11 PROCEDURE — 96375 TX/PRO/DX INJ NEW DRUG ADDON: CPT

## 2024-06-11 PROCEDURE — 85025 COMPLETE CBC W/AUTO DIFF WBC: CPT | Performed by: EMERGENCY MEDICINE

## 2024-06-11 PROCEDURE — 83605 ASSAY OF LACTIC ACID: CPT | Performed by: EMERGENCY MEDICINE

## 2024-06-11 PROCEDURE — 25010000002 ONDANSETRON PER 1 MG: Performed by: EMERGENCY MEDICINE

## 2024-06-11 PROCEDURE — 99285 EMERGENCY DEPT VISIT HI MDM: CPT

## 2024-06-11 RX ORDER — ONDANSETRON 2 MG/ML
4 INJECTION INTRAMUSCULAR; INTRAVENOUS ONCE
Status: COMPLETED | OUTPATIENT
Start: 2024-06-11 | End: 2024-06-11

## 2024-06-11 RX ORDER — SODIUM CHLORIDE 0.9 % (FLUSH) 0.9 %
10 SYRINGE (ML) INJECTION AS NEEDED
Status: DISCONTINUED | OUTPATIENT
Start: 2024-06-11 | End: 2024-06-12 | Stop reason: HOSPADM

## 2024-06-11 RX ADMIN — ONDANSETRON 4 MG: 2 INJECTION INTRAMUSCULAR; INTRAVENOUS at 22:15

## 2024-06-11 RX ADMIN — IOPAMIDOL 100 ML: 612 INJECTION, SOLUTION INTRAVENOUS at 18:34

## 2024-06-11 RX ADMIN — MORPHINE SULFATE 4 MG: 4 INJECTION, SOLUTION INTRAMUSCULAR; INTRAVENOUS at 22:15
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
## 2024-06-12 NOTE — ED NOTES
Called [facility] for status update on request for consult/transfer. They stated it shouldn't be much longer.

## 2024-06-14 ENCOUNTER — READMISSION MANAGEMENT (OUTPATIENT)
Dept: CALL CENTER | Facility: HOSPITAL | Age: 59
End: 2024-06-14
Payer: COMMERCIAL

## 2024-06-15 NOTE — OUTREACH NOTE
Prep Survey      Flowsheet Row Responses   [facility] facility patient discharged from? Non-BH   Is LACE score < 7 ? Non-BH Discharge   Eligibility [facility]   Date of Admission 06/11/24   Date of Discharge 06/14/24   Discharge Disposition Home or Self Care   Discharge diagnosis Anemia,   Does the patient have one of the following disease processes/diagnoses(primary or secondary)? Other   Does the patient have Home health ordered? No   Is there a DME ordered? No   Prep survey completed? Yes            PEYTON DRAKE - Registered Nurse

## 2024-06-17 ENCOUNTER — TRANSITIONAL CARE MANAGEMENT TELEPHONE ENCOUNTER (OUTPATIENT)
Dept: CALL CENTER | Facility: HOSPITAL | Age: 59
End: 2024-06-17
Payer: COMMERCIAL

## 2024-06-17 NOTE — OUTREACH NOTE
Call Center TCM Note      Flowsheet Row Responses   [facility] patient discharged from? Non-   Does the patient have one of the following disease processes/diagnoses(primary or secondary)? Other   TCM attempt successful? Yes   Call start time 0841   Call end time 0843   Discharge diagnosis Anemia,   Meds reviewed with patient/caregiver? Yes   Is the patient taking all medications as directed (includes completed medication regime)? Yes   Does the patient have an appointment with their PCP within 7-14 days of discharge? Yes   Has home health visited the patient within 72 hours of discharge? N/A   Psychosocial issues? No   Did the patient receive a copy of their discharge instructions? Yes   Nursing interventions Reviewed instructions with patient   What is the patient's perception of their health status since discharge? Improving   Is the patient/caregiver able to teach back signs and symptoms related to disease process for when to call PCP? Yes   Is the patient/caregiver able to teach back signs and symptoms related to disease process for when to call 911? Yes   Is the patient/caregiver able to teach back the hierarchy of who to call/visit for symptoms/problems? PCP, Specialist, Home health nurse, Urgent Care, ED, 911 Yes   If the patient is a current smoker, are they able to teach back resources for cessation? Not a smoker   TCM call completed? Yes   Call end time 0843   Would this patient benefit from a Referral to Amb Social Work? No   Is the patient interested in additional calls from an ambulatory ? No            Vera Leon LPN    6/17/2024, 09:01 EDT

## 2024-07-04 DIAGNOSIS — F51.04 PSYCHOPHYSIOLOGIC INSOMNIA: ICD-10-CM

## 2024-07-05 RX ORDER — ZOLPIDEM TARTRATE 5 MG/1
5 TABLET ORAL NIGHTLY PRN
Qty: 30 TABLET | Refills: 2 | Status: SHIPPED | OUTPATIENT
Start: 2024-07-05

## 2024-07-05 NOTE — TELEPHONE ENCOUNTER
Rx Refill Note  Requested Prescriptions     Pending Prescriptions Disp Refills    zolpidem (AMBIEN) 5 MG tablet 30 tablet 0     Sig: Take 1 tablet by mouth At Night As Needed for Sleep. Indications: Trouble Sleeping      Last office visit with prescribing clinician: 4/29/2024   Last telemedicine visit with prescribing clinician: Visit date not found   Next office visit with prescribing clinician: 7/16/2024                         Would you like a call back once the refill request has been completed: [] Yes [] No    If the office needs to give you a call back, can they leave a voicemail: [] Yes [] No    Tod Randle MA  07/05/24, 07:49 EDT

## 2024-07-11 ENCOUNTER — TELEPHONE (OUTPATIENT)
Dept: NEUROLOGY | Facility: CLINIC | Age: 59
End: 2024-07-11
Payer: COMMERCIAL

## 2024-07-11 NOTE — TELEPHONE ENCOUNTER
Provider: BENSON    Caller: DONTA    Phone Number: [phone]     Reason for Call: PT CALLED AND STATES THAT SHE WAS GIVEN THE LOADING DOSE OF EMGALITY  AT LAST APPT AND WAS TOLD A OFFICE WOULD CHECK AND SEE IF A PRIOR AUTH WAS NEEDED. PT WOULD LIKE TO KNOW IF MEDICATION NEEDED PRIOR AUTH OR IF MEDICATION IS READY AT PHARMACY.    THANK YOU

## 2024-07-11 NOTE — TELEPHONE ENCOUNTER
Patient insurance denied the PA I appealed and faxed back on 06/04/2024. I have not received a determination back from insurance.       I will call on Monday to check the determination of the authorization.

## 2024-07-15 ENCOUNTER — PATIENT MESSAGE (OUTPATIENT)
Dept: NEUROLOGY | Facility: CLINIC | Age: 59
End: 2024-07-15
Payer: COMMERCIAL

## 2024-07-15 NOTE — TELEPHONE ENCOUNTER
CALLED AND CHECKED ON PA. INSURANCE COMPANY IS STATING WE DIDN'T SUBMIT TRIED AND FAILED MEDICATIONS. HOWEVER, WE DID WITHIN THE CHART NOTES.     TRIED AND FAILED MEDS WERE LISTED IN BOLD PRINT IN THE OFFICE NOTE.     SUBMITTED AN APPEAL. DETERMINATION COULD TAKE 24-72HOURS       UNFORTUNATELY, WE DO NOT HAVE ANY SAMPLES TO PROVIDE HER WITH IN THE [place] OFFICE EITHER.

## 2024-07-16 ENCOUNTER — OFFICE VISIT (OUTPATIENT)
Dept: INTERNAL MEDICINE | Facility: CLINIC | Age: 59
End: 2024-07-16
Payer: COMMERCIAL

## 2024-07-16 VITALS
DIASTOLIC BLOOD PRESSURE: 68 MMHG | OXYGEN SATURATION: 97 % | TEMPERATURE: 98.2 F | WEIGHT: 185 LBS | HEIGHT: 61 IN | HEART RATE: 91 BPM | SYSTOLIC BLOOD PRESSURE: 106 MMHG | BODY MASS INDEX: 34.93 KG/M2

## 2024-07-16 DIAGNOSIS — I10 PRIMARY HYPERTENSION: ICD-10-CM

## 2024-07-16 DIAGNOSIS — F51.04 PSYCHOPHYSIOLOGIC INSOMNIA: ICD-10-CM

## 2024-07-16 DIAGNOSIS — G47.33 OSA ON CPAP: ICD-10-CM

## 2024-07-16 DIAGNOSIS — E03.9 ACQUIRED HYPOTHYROIDISM: ICD-10-CM

## 2024-07-16 DIAGNOSIS — J45.20 MILD INTERMITTENT ASTHMA WITHOUT COMPLICATION: ICD-10-CM

## 2024-07-16 DIAGNOSIS — G62.9 NEUROPATHY: ICD-10-CM

## 2024-07-16 DIAGNOSIS — E78.5 HYPERLIPIDEMIA, UNSPECIFIED HYPERLIPIDEMIA TYPE: ICD-10-CM

## 2024-07-16 DIAGNOSIS — Z13.0 SCREENING FOR DISORDER OF BLOOD AND BLOOD-FORMING ORGANS: ICD-10-CM

## 2024-07-16 DIAGNOSIS — F43.21 GRIEF: ICD-10-CM

## 2024-07-16 DIAGNOSIS — G43.009 MIGRAINE WITHOUT AURA AND WITHOUT STATUS MIGRAINOSUS, NOT INTRACTABLE: ICD-10-CM

## 2024-07-16 DIAGNOSIS — M62.838 MUSCLE SPASM OF RIGHT SHOULDER: ICD-10-CM

## 2024-07-16 DIAGNOSIS — E11.9 TYPE 2 DIABETES MELLITUS WITHOUT COMPLICATION, WITHOUT LONG-TERM CURRENT USE OF INSULIN: ICD-10-CM

## 2024-07-16 DIAGNOSIS — Z00.00 ENCOUNTER FOR ANNUAL PHYSICAL EXAMINATION EXCLUDING GYNECOLOGICAL EXAMINATION IN A PATIENT OLDER THAN 17 YEARS: Primary | ICD-10-CM

## 2024-07-16 LAB
25(OH)D3+25(OH)D2 SERPL-MCNC: 34.1 NG/ML (ref 30–100)
ALBUMIN SERPL-MCNC: 4.4 G/DL (ref 3.5–5.2)
ALBUMIN/GLOB SERPL: 1.9 G/DL
ALP SERPL-CCNC: 67 U/L (ref 39–117)
ALT SERPL-CCNC: 7 U/L (ref 1–33)
AST SERPL-CCNC: 15 U/L (ref 1–32)
BASOPHILS # BLD AUTO: 0.07 10*3/MM3 (ref 0–0.2)
BASOPHILS NFR BLD AUTO: 0.8 % (ref 0–1.5)
BILIRUB SERPL-MCNC: 0.3 MG/DL (ref 0–1.2)
BUN SERPL-MCNC: 10 MG/DL (ref 6–20)
BUN/CREAT SERPL: 13 (ref 7–25)
CALCIUM SERPL-MCNC: 9.8 MG/DL (ref 8.6–10.5)
CHLORIDE SERPL-SCNC: 101 MMOL/L (ref 98–107)
CHOLEST SERPL-MCNC: 129 MG/DL (ref 0–200)
CO2 SERPL-SCNC: 28.1 MMOL/L (ref 22–29)
CREAT SERPL-MCNC: 0.77 MG/DL (ref 0.57–1)
EGFRCR SERPLBLD CKD-EPI 2021: 89 ML/MIN/1.73
EOSINOPHIL # BLD AUTO: 0.12 10*3/MM3 (ref 0–0.4)
EOSINOPHIL NFR BLD AUTO: 1.4 % (ref 0.3–6.2)
ERYTHROCYTE [DISTWIDTH] IN BLOOD BY AUTOMATED COUNT: 17 % (ref 12.3–15.4)
GLOBULIN SER CALC-MCNC: 2.3 GM/DL
GLUCOSE SERPL-MCNC: 104 MG/DL (ref 65–99)
HBA1C MFR BLD: 5.4 % (ref 4.8–5.6)
HCT VFR BLD AUTO: 35.1 % (ref 34–46.6)
HDLC SERPL-MCNC: 66 MG/DL (ref 40–60)
HGB BLD-MCNC: 11.1 G/DL (ref 12–15.9)
IMM GRANULOCYTES # BLD AUTO: 0.02 10*3/MM3 (ref 0–0.05)
IMM GRANULOCYTES NFR BLD AUTO: 0.2 % (ref 0–0.5)
LDLC SERPL CALC-MCNC: 41 MG/DL (ref 0–100)
LYMPHOCYTES # BLD AUTO: 2.39 10*3/MM3 (ref 0.7–3.1)
LYMPHOCYTES NFR BLD AUTO: 27.4 % (ref 19.6–45.3)
MCH RBC QN AUTO: 27.8 PG (ref 26.6–33)
MCHC RBC AUTO-ENTMCNC: 31.6 G/DL (ref 31.5–35.7)
MCV RBC AUTO: 87.8 FL (ref 79–97)
MONOCYTES # BLD AUTO: 0.58 10*3/MM3 (ref 0.1–0.9)
MONOCYTES NFR BLD AUTO: 6.7 % (ref 5–12)
NEUTROPHILS # BLD AUTO: 5.54 10*3/MM3 (ref 1.7–7)
NEUTROPHILS NFR BLD AUTO: 63.5 % (ref 42.7–76)
NRBC BLD AUTO-RTO: 0 /100 WBC (ref 0–0.2)
PLATELET # BLD AUTO: 369 10*3/MM3 (ref 140–450)
POTASSIUM SERPL-SCNC: 4.6 MMOL/L (ref 3.5–5.2)
PROT SERPL-MCNC: 6.7 G/DL (ref 6–8.5)
RBC # BLD AUTO: 4 10*6/MM3 (ref 3.77–5.28)
SODIUM SERPL-SCNC: 142 MMOL/L (ref 136–145)
T4 FREE SERPL-MCNC: 1.34 NG/DL (ref 0.92–1.68)
TRIGL SERPL-MCNC: 130 MG/DL (ref 0–150)
TSH SERPL DL<=0.005 MIU/L-ACNC: 2.43 UIU/ML (ref 0.27–4.2)
VLDLC SERPL CALC-MCNC: 22 MG/DL (ref 5–40)
WBC # BLD AUTO: 8.72 10*3/MM3 (ref 3.4–10.8)

## 2024-07-16 PROCEDURE — 1126F AMNT PAIN NOTED NONE PRSNT: CPT | Performed by: NURSE PRACTITIONER

## 2024-07-16 PROCEDURE — 99396 PREV VISIT EST AGE 40-64: CPT | Performed by: NURSE PRACTITIONER

## 2024-07-16 PROCEDURE — 1160F RVW MEDS BY RX/DR IN RCRD: CPT | Performed by: NURSE PRACTITIONER

## 2024-07-16 PROCEDURE — 3044F HG A1C LEVEL LT 7.0%: CPT | Performed by: NURSE PRACTITIONER

## 2024-07-16 PROCEDURE — 1159F MED LIST DOCD IN RCRD: CPT | Performed by: NURSE PRACTITIONER

## 2024-07-16 PROCEDURE — 3074F SYST BP LT 130 MM HG: CPT | Performed by: NURSE PRACTITIONER

## 2024-07-16 PROCEDURE — 3078F DIAST BP <80 MM HG: CPT | Performed by: NURSE PRACTITIONER

## 2024-07-16 RX ORDER — CYCLOBENZAPRINE HCL 10 MG
10 TABLET ORAL 2 TIMES DAILY PRN
Qty: 30 TABLET | Refills: 1 | Status: SHIPPED | OUTPATIENT
Start: 2024-07-16

## 2024-07-16 RX ORDER — ALBUTEROL SULFATE 90 UG/1
2 AEROSOL, METERED RESPIRATORY (INHALATION) EVERY 6 HOURS PRN
Qty: 18 G | Refills: 11 | Status: SHIPPED | OUTPATIENT
Start: 2024-07-16

## 2024-07-16 RX ORDER — DOCUSATE SODIUM 50MG AND SENNOSIDES 8.6MG 8.6; 5 MG/1; MG/1
TABLET, FILM COATED ORAL
COMMUNITY
Start: 2024-06-09 | End: 2024-07-16

## 2024-07-16 RX ORDER — METHOCARBAMOL 750 MG/1
TABLET, FILM COATED ORAL
COMMUNITY
Start: 2024-07-05

## 2024-07-16 RX ORDER — OXYCODONE HYDROCHLORIDE 5 MG/1
TABLET ORAL
COMMUNITY
Start: 2024-06-14 | End: 2024-07-16
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
## 2024-07-17 NOTE — PROGRESS NOTES
Anemia improved.   Vitamin D on the low side of normal.  Take an over the counter vitamin D3 supplement, 2000 IU, daily.  Take with calcium to enhance absorption.   Otherwise, labs are normal/stable.

## 2024-08-20 ENCOUNTER — OFFICE VISIT (OUTPATIENT)
Dept: INTERNAL MEDICINE | Facility: CLINIC | Age: 59
End: 2024-08-20
Payer: COMMERCIAL

## 2024-08-20 VITALS
BODY MASS INDEX: 35.5 KG/M2 | HEIGHT: 61 IN | HEART RATE: 88 BPM | OXYGEN SATURATION: 98 % | WEIGHT: 188 LBS | TEMPERATURE: 98.4 F | DIASTOLIC BLOOD PRESSURE: 80 MMHG | SYSTOLIC BLOOD PRESSURE: 130 MMHG

## 2024-08-20 DIAGNOSIS — J45.20 MILD INTERMITTENT ASTHMA WITHOUT COMPLICATION: ICD-10-CM

## 2024-08-20 DIAGNOSIS — R05.1 ACUTE COUGH: Primary | ICD-10-CM

## 2024-08-20 DIAGNOSIS — J02.9 SORE THROAT: ICD-10-CM

## 2024-08-20 DIAGNOSIS — I10 PRIMARY HYPERTENSION: ICD-10-CM

## 2024-08-20 DIAGNOSIS — E11.9 TYPE 2 DIABETES MELLITUS WITHOUT COMPLICATION, WITHOUT LONG-TERM CURRENT USE OF INSULIN: ICD-10-CM

## 2024-08-20 LAB
EXPIRATION DATE: NORMAL
EXPIRATION DATE: NORMAL
FLUAV AG UPPER RESP QL IA.RAPID: NOT DETECTED
FLUBV AG UPPER RESP QL IA.RAPID: NOT DETECTED
INTERNAL CONTROL: NORMAL
INTERNAL CONTROL: NORMAL
Lab: NORMAL
Lab: NORMAL
S PYO AG THROAT QL: NEGATIVE
SARS-COV-2 AG UPPER RESP QL IA.RAPID: NOT DETECTED

## 2024-08-20 PROCEDURE — 87428 SARSCOV & INF VIR A&B AG IA: CPT | Performed by: NURSE PRACTITIONER

## 2024-08-20 PROCEDURE — 99214 OFFICE O/P EST MOD 30 MIN: CPT | Performed by: NURSE PRACTITIONER

## 2024-08-20 PROCEDURE — 3075F SYST BP GE 130 - 139MM HG: CPT | Performed by: NURSE PRACTITIONER

## 2024-08-20 PROCEDURE — 1160F RVW MEDS BY RX/DR IN RCRD: CPT | Performed by: NURSE PRACTITIONER

## 2024-08-20 PROCEDURE — 3079F DIAST BP 80-89 MM HG: CPT | Performed by: NURSE PRACTITIONER

## 2024-08-20 PROCEDURE — 87880 STREP A ASSAY W/OPTIC: CPT | Performed by: NURSE PRACTITIONER

## 2024-08-20 PROCEDURE — 1159F MED LIST DOCD IN RCRD: CPT | Performed by: NURSE PRACTITIONER

## 2024-08-20 PROCEDURE — 3044F HG A1C LEVEL LT 7.0%: CPT | Performed by: NURSE PRACTITIONER

## 2024-08-20 PROCEDURE — 1125F AMNT PAIN NOTED PAIN PRSNT: CPT | Performed by: NURSE PRACTITIONER

## 2024-08-20 RX ORDER — DEXTROMETHORPHAN HYDROBROMIDE AND PROMETHAZINE HYDROCHLORIDE 15; 6.25 MG/5ML; MG/5ML
5 SYRUP ORAL 4 TIMES DAILY PRN
Qty: 240 ML | Refills: 0 | Status: SHIPPED | OUTPATIENT
Start: 2024-08-20 | End: 2024-08-30

## 2024-08-20 RX ORDER — VILAZODONE HYDROCHLORIDE 40 MG/1
40 TABLET ORAL DAILY
Qty: 90 TABLET | Refills: 1 | Status: SHIPPED | OUTPATIENT
Start: 2024-08-20
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
## 2024-08-20 NOTE — TELEPHONE ENCOUNTER
Rx Refill Note  Requested Prescriptions     Pending Prescriptions Disp Refills    vilazodone (Viibryd) 40 MG tablet tablet 90 tablet 1     Sig: Take 1 tablet by mouth Daily.      Last office visit with prescribing clinician: 7/16/2024   Last telemedicine visit with prescribing clinician: Visit date not found   Next office visit with prescribing clinician: 8/20/2024                         Would you like a call back once the refill request has been completed: [] Yes [] No    If the office needs to give you a call back, can they leave a voicemail: [] Yes [] No    Bharti Roca MA  08/20/24, 14:09 EDT

## 2024-08-21 ENCOUNTER — OFFICE VISIT (OUTPATIENT)
Dept: NEUROLOGY | Facility: CLINIC | Age: 59
End: 2024-08-21
Payer: COMMERCIAL

## 2024-08-21 VITALS
SYSTOLIC BLOOD PRESSURE: 118 MMHG | OXYGEN SATURATION: 98 % | TEMPERATURE: 97.5 F | BODY MASS INDEX: 35.5 KG/M2 | DIASTOLIC BLOOD PRESSURE: 81 MMHG | HEART RATE: 95 BPM | WEIGHT: 188 LBS | HEIGHT: 61 IN

## 2024-08-21 DIAGNOSIS — G47.33 OSA ON CPAP: ICD-10-CM

## 2024-08-21 DIAGNOSIS — G43.719 INTRACTABLE CHRONIC MIGRAINE WITHOUT AURA AND WITHOUT STATUS MIGRAINOSUS: Primary | ICD-10-CM

## 2024-08-21 DIAGNOSIS — G43.909 ACUTE MIGRAINE: ICD-10-CM

## 2024-08-21 NOTE — PROGRESS NOTES
"     Follow Up Office Visit      Patient Name: Robina Dumont  : 1965   MRN: [id number]     Chief Complaint:    Chief Complaint   Patient presents with    Migraine     Pt is here for a f/u for migraines. Pt states that she had approx 3 or 4 headaches a month.        History of Present Illness: Robina Dumont is a 59 y.o. female who is here today to follow up with migraines and was last seen on 2024.  She is still using her CPAP therapy.  She is taking Emgality 120mg SC monthly and it does seem to be helping with her migraines.  She is taking Ubrelvy PRN and that does help.  She is having about 2-4 migraines per month.  Sleeping on her left side seems to cause a posterior headache.  She does have a lot of tension and myofascial pain in her neck and shoulders.  She hasn't ever had trigger point injections.      Following taken from previous visit note:  Robina Dumont is a 59 y.o. female who is here today to follow up with JIMMY and was last seen on 3/26/2024. Currently on CPAP 13cm, compliance 93%, AHI 2/hour, average time in large leak per day 8 minutes and 43 seconds.  She is tolerating her pressure well.  She does feel better when using CPAP therapy- sleeps better \"When I actually sleep\".  Says she is still snoring at times.    *PSG on 2024 did not show evidence of significant JIMMY with an AHI of 4/hour- the study also showed poor sleep efficiency but she feels she slept very well during the study.   *DME company- [employer]  *Mask- [employer]      Migraine disorder- Prescribed Atogepant (at previous and it does help with her migraines and she has had 90% improvement in her migraines with this- only 2 migraines since previous visit) visit but insurance will not cover that (appeal denied also).  Insurance company wants her to try Aimovig, Emgality, Ajovy, or Nurtec.      Subjective      Review of Systems:   Review of Systems   Neurological:  Positive for headache.       I have reviewed and the " following portions of the patient's history were updated as appropriate: past family history, past medical history, past social history, past surgical history and problem list.    Medications:     Current Outpatient Medications:     acetaminophen (TYLENOL) 500 MG tablet, Take 2 tablets by mouth Every 8 (Eight) Hours x1 week, then take as needed., Disp: 42 tablet, Rfl: 0    Advair Diskus 500-50 MCG/ACT DISKUS, Inhale 1 puff by mouth 2 (Two) Times a Day., Disp: 180 each, Rfl: 3    albuterol sulfate  (90 Base) MCG/ACT inhaler, Inhale 2 puffs by mouth Every 6 (Six) Hours As Needed for Wheezing or Shortness of Air., Disp: 18 g, Rfl: 11    amLODIPine (NORVASC) 2.5 MG tablet, Take 1 tablet by mouth Daily., Disp: 90 tablet, Rfl: 3    amoxicillin-clavulanate (AUGMENTIN) 875-125 MG per tablet, Take 1 tablet by mouth 2 (Two) Times a Day for 10 days., Disp: 20 tablet, Rfl: 0    ARIPiprazole (ABILIFY) 2 MG tablet, Take 1 tablet by mouth Daily., Disp: 90 tablet, Rfl: 1    atorvastatin (LIPITOR) 40 MG tablet, Take 1 tablet by mouth Daily., Disp: 90 tablet, Rfl: 0    CBD oil (cannabidiol) capsule, Take  by mouth., Disp: , Rfl:     cetirizine (zyrTEC) 10 MG tablet, Take 1 tablet by mouth Daily., Disp: , Rfl:     cyclobenzaprine (FLEXERIL) 10 MG tablet, Take 1 tablet by mouth 2 (Two) Times a Day As Needed for Muscle Spasms., Disp: 30 tablet, Rfl: 1    cycloSPORINE (Restasis) 0.05 % ophthalmic emulsion, Apply 1 drop to eye(s) as directed by provider Every 12 (Twelve) Hours., Disp: , Rfl:     DULoxetine (CYMBALTA) 60 MG capsule, Take 1 capsule by mouth Daily., Disp: 90 capsule, Rfl: 3    galcanezumab-gnlm (EMGALITY) 120 MG/ML auto-injector pen, Inject 1 mL under the skin into the appropriate area as directed Every 30 (Thirty) Days., Disp: 1 mL, Rfl: 5    Insulin Pen Needle (Pen Needles) 31G X 6 MM misc, 1 application Daily., Disp: 90 each, Rfl: 3    levothyroxine (SYNTHROID, LEVOTHROID) 50 MCG tablet, Take 1 tablet by mouth  Daily., Disp: 90 tablet, Rfl: 0    Liraglutide (VICTOZA) 18 MG/3ML solution pen-injector injection, Inject 1.8 mg under the skin into the appropriate area as directed Daily., Disp: 27 mL, Rfl: 3    losartan-hydrochlorothiazide (HYZAAR) 100-25 MG per tablet, Take 1 tablet by mouth Daily., Disp: 90 tablet, Rfl: 1    Melatonin 10 MG tablet, Take 3 tablets by mouth Every Night., Disp: , Rfl:     metFORMIN (GLUCOPHAGE) 1000 MG tablet, Take 1 tablet by mouth 2 (Two) Times a Day With Meals., Disp: 180 tablet, Rfl: 1    methocarbamol (ROBAXIN) 750 MG tablet, , Disp: , Rfl:     montelukast (SINGULAIR) 10 MG tablet, Take 1 tablet by mouth Daily., Disp: 90 tablet, Rfl: 3    olopatadine (PATANOL) 0.1 % ophthalmic solution, Apply 1 drop to eye(s) as directed by provider Every 12 (Twelve) Hours., Disp: , Rfl:     omeprazole (priLOSEC) 20 MG capsule, Take 1 capsule by mouth As Needed., Disp: , Rfl:     ondansetron (Zofran) 4 MG tablet, Take 1 tablet by mouth Every 8 (Eight) Hours As Needed for Nausea or Vomiting., Disp: 30 tablet, Rfl: 0    potassium chloride (MICRO-K) 10 MEQ CR capsule, Take 1 capsule by mouth Daily., Disp: 90 capsule, Rfl: 1    promethazine-dextromethorphan (PROMETHAZINE-DM) 6.25-15 MG/5ML syrup, Take 5 mL by mouth 4 (Four) Times a Day As Needed for Cough for up to 10 days., Disp: 240 mL, Rfl: 0    traZODone (DESYREL) 100 MG tablet, Take 2 tablets by mouth Every Night., Disp: 180 tablet, Rfl: 1    vilazodone (Viibryd) 40 MG tablet tablet, Take 1 tablet by mouth Daily., Disp: 90 tablet, Rfl: 1    zolpidem (AMBIEN) 5 MG tablet, Take 1 tablet by mouth At Night As Needed for Sleep. Indications: Trouble Sleeping, Disp: 30 tablet, Rfl: 2    ubrogepant (Ubrelvy) 100 MG tablet, TAKE 1 TABLET WITH FIRST SYMPTOM OF MIGRAINE. MAY TAKE AGAIN 2 HOURS LATER IF MIGRAINE PERSISTS, Disp: 10 tablet, Rfl: 2    Current Facility-Administered Medications:     Galcanezumab-gnlm solution prefilled syringe 240 mg, 240 mg, Subcutaneous,  "Q28 Days, Cici Tate, APRN, 240 mg at 06/04/24 1143    Allergies:   Allergies   Allergen Reactions    Erythromycin GI Intolerance and Nausea Only    Sudanyl [Pseudoephedrine] Other (See Comments)     Facial swelling     Codeine Itching    Prednisone Other (See Comments)     Hypertension         Objective     Physical Exam:  Vital Signs:   Vitals:    08/21/24 1003   BP: 118/81   Pulse: 95   Temp: 97.5 °F (36.4 °C)   SpO2: 98%   Weight: 85.3 kg (188 lb)   Height: 154.9 cm (61\")   PainSc:   5   PainLoc: Head     Body mass index is 35.52 kg/m².    Physical Exam  Vitals and nursing note reviewed.   Constitutional:       General: She is not in acute distress.     Appearance: Normal appearance. She is well-developed. She is obese. She is not diaphoretic.   HENT:      Head: Normocephalic and atraumatic.   Eyes:      Extraocular Movements: Extraocular movements intact.      Conjunctiva/sclera: Conjunctivae normal.   Pulmonary:      Effort: Pulmonary effort is normal. No respiratory distress.   Musculoskeletal:         General: Normal range of motion.   Skin:     General: Skin is warm and dry.   Neurological:      Mental Status: She is alert and oriented to person, place, and time.   Psychiatric:         Mood and Affect: Mood normal.         Behavior: Behavior normal.         Thought Content: Thought content normal.         Judgment: Judgment normal.         Neurologic Exam     Mental Status   Oriented to person, place, and time.        Assessment / Plan      Assessment/Plan:   Diagnoses and all orders for this visit:    1. Intractable chronic migraine without aura and without status migrainosus (Primary)  -     galcanezumab-gnlm (EMGALITY) 120 MG/ML auto-injector pen; Inject 1 mL under the skin into the appropriate area as directed Every 30 (Thirty) Days.  Dispense: 1 mL; Refill: 5    2. JIMMY on CPAP    3. Acute migraine  -     ubrogepant (Ubrelvy) 100 MG tablet; TAKE 1 TABLET WITH FIRST SYMPTOM OF MIGRAINE. MAY " TAKE AGAIN 2 HOURS LATER IF MIGRAINE PERSISTS  Dispense: 10 tablet; Refill: 2    4. BMI 35.0-35.9,adult         Follow Up:   Return in about 3 weeks (around 9/11/2024) for Trigger point injections.    ZEHRA Elaine, FNP-C  [facility]

## 2024-09-03 DIAGNOSIS — R05.1 ACUTE COUGH: ICD-10-CM

## 2024-09-03 RX ORDER — ATORVASTATIN CALCIUM 40 MG/1
40 TABLET, FILM COATED ORAL DAILY
Qty: 90 TABLET | Refills: 1 | Status: SHIPPED | OUTPATIENT
Start: 2024-09-03

## 2024-09-03 RX ORDER — DEXTROMETHORPHAN HYDROBROMIDE AND PROMETHAZINE HYDROCHLORIDE 15; 6.25 MG/5ML; MG/5ML
5 SYRUP ORAL 4 TIMES DAILY PRN
Qty: 240 ML | Refills: 0 | Status: SHIPPED | OUTPATIENT
Start: 2024-09-03 | End: 2024-09-16

## 2024-09-10 ENCOUNTER — PRIOR AUTHORIZATION (OUTPATIENT)
Dept: INTERNAL MEDICINE | Facility: CLINIC | Age: 59
End: 2024-09-10
Payer: COMMERCIAL

## 2024-09-18 ENCOUNTER — PROCEDURE VISIT (OUTPATIENT)
Dept: NEUROLOGY | Facility: CLINIC | Age: 59
End: 2024-09-18
Payer: COMMERCIAL

## 2024-09-18 VITALS
HEIGHT: 61 IN | SYSTOLIC BLOOD PRESSURE: 110 MMHG | BODY MASS INDEX: 37 KG/M2 | WEIGHT: 196 LBS | DIASTOLIC BLOOD PRESSURE: 80 MMHG | HEART RATE: 74 BPM | OXYGEN SATURATION: 99 % | TEMPERATURE: 97.1 F

## 2024-09-18 DIAGNOSIS — G47.19 EXCESSIVE DAYTIME SLEEPINESS: ICD-10-CM

## 2024-09-18 DIAGNOSIS — M79.18 MYOFASCIAL PAIN: Primary | ICD-10-CM

## 2024-09-18 RX ORDER — METHYLPREDNISOLONE SODIUM SUCCINATE 125 MG/2ML
125 INJECTION, POWDER, LYOPHILIZED, FOR SOLUTION INTRAMUSCULAR; INTRAVENOUS ONCE
Status: COMPLETED | OUTPATIENT
Start: 2024-09-18 | End: 2024-09-18

## 2024-09-18 RX ORDER — BUPIVACAINE HYDROCHLORIDE 5 MG/ML
8 INJECTION, SOLUTION PERINEURAL ONCE
Status: COMPLETED | OUTPATIENT
Start: 2024-09-18 | End: 2024-09-18

## 2024-09-18 RX ADMIN — BUPIVACAINE HYDROCHLORIDE 8 ML: 5 INJECTION, SOLUTION PERINEURAL at 11:50

## 2024-09-18 RX ADMIN — METHYLPREDNISOLONE SODIUM SUCCINATE 125 MG: 125 INJECTION, POWDER, LYOPHILIZED, FOR SOLUTION INTRAMUSCULAR; INTRAVENOUS at 11:50

## 2024-10-07 ENCOUNTER — OFFICE VISIT (OUTPATIENT)
Dept: INTERNAL MEDICINE | Facility: CLINIC | Age: 59
End: 2024-10-07
Payer: COMMERCIAL

## 2024-10-07 VITALS
SYSTOLIC BLOOD PRESSURE: 124 MMHG | HEIGHT: 61 IN | TEMPERATURE: 98 F | DIASTOLIC BLOOD PRESSURE: 84 MMHG | OXYGEN SATURATION: 97 % | HEART RATE: 80 BPM | BODY MASS INDEX: 38.14 KG/M2 | WEIGHT: 202 LBS

## 2024-10-07 DIAGNOSIS — J01.00 ACUTE NON-RECURRENT MAXILLARY SINUSITIS: Primary | ICD-10-CM

## 2024-10-07 DIAGNOSIS — J34.89 SINUS PRESSURE: ICD-10-CM

## 2024-10-07 DIAGNOSIS — H10.33 ACUTE CONJUNCTIVITIS OF BOTH EYES, UNSPECIFIED ACUTE CONJUNCTIVITIS TYPE: ICD-10-CM

## 2024-10-07 DIAGNOSIS — T78.40XA ALLERGIC REACTION, INITIAL ENCOUNTER: ICD-10-CM

## 2024-10-07 PROBLEM — R09.81 NASAL CONGESTION: Status: ACTIVE | Noted: 2024-10-07

## 2024-10-07 PROBLEM — R09.81 NASAL CONGESTION: Status: RESOLVED | Noted: 2024-10-07 | Resolved: 2024-10-07

## 2024-10-07 PROCEDURE — 1125F AMNT PAIN NOTED PAIN PRSNT: CPT | Performed by: FAMILY MEDICINE

## 2024-10-07 PROCEDURE — 3074F SYST BP LT 130 MM HG: CPT | Performed by: FAMILY MEDICINE

## 2024-10-07 PROCEDURE — 3079F DIAST BP 80-89 MM HG: CPT | Performed by: FAMILY MEDICINE

## 2024-10-07 PROCEDURE — 1160F RVW MEDS BY RX/DR IN RCRD: CPT | Performed by: FAMILY MEDICINE

## 2024-10-07 PROCEDURE — 96372 THER/PROPH/DIAG INJ SC/IM: CPT | Performed by: FAMILY MEDICINE

## 2024-10-07 PROCEDURE — 3044F HG A1C LEVEL LT 7.0%: CPT | Performed by: FAMILY MEDICINE

## 2024-10-07 PROCEDURE — 87428 SARSCOV & INF VIR A&B AG IA: CPT | Performed by: FAMILY MEDICINE

## 2024-10-07 PROCEDURE — 99214 OFFICE O/P EST MOD 30 MIN: CPT | Performed by: FAMILY MEDICINE

## 2024-10-07 PROCEDURE — 1159F MED LIST DOCD IN RCRD: CPT | Performed by: FAMILY MEDICINE

## 2024-10-07 RX ORDER — METHYLPREDNISOLONE SODIUM SUCCINATE 40 MG/ML
40 INJECTION, POWDER, LYOPHILIZED, FOR SOLUTION INTRAMUSCULAR; INTRAVENOUS ONCE
Status: COMPLETED | OUTPATIENT
Start: 2024-10-07 | End: 2024-10-07

## 2024-10-07 RX ORDER — METHYLPREDNISOLONE SODIUM SUCCINATE 40 MG/ML
40 INJECTION, POWDER, LYOPHILIZED, FOR SOLUTION INTRAMUSCULAR; INTRAVENOUS ONCE
Status: DISCONTINUED | OUTPATIENT
Start: 2024-10-07 | End: 2024-10-07

## 2024-10-07 RX ORDER — CEFDINIR 300 MG/1
300 CAPSULE ORAL 2 TIMES DAILY
Qty: 20 CAPSULE | Refills: 0 | Status: SHIPPED | OUTPATIENT
Start: 2024-10-07

## 2024-10-07 RX ORDER — OLOPATADINE HYDROCHLORIDE 1 MG/ML
1 SOLUTION/ DROPS OPHTHALMIC EVERY 12 HOURS
Qty: 5 ML | Refills: 0 | Status: SHIPPED | OUTPATIENT
Start: 2024-10-07

## 2024-10-07 RX ORDER — OFLOXACIN 3 MG/ML
1 SOLUTION/ DROPS OPHTHALMIC 4 TIMES DAILY
Qty: 5 ML | Refills: 0 | Status: SHIPPED | OUTPATIENT
Start: 2024-10-07

## 2024-10-07 RX ADMIN — METHYLPREDNISOLONE SODIUM SUCCINATE 40 MG: 40 INJECTION, POWDER, LYOPHILIZED, FOR SOLUTION INTRAMUSCULAR; INTRAVENOUS at 15:38

## 2024-10-07 NOTE — PROGRESS NOTES
Robina Dumont is a 59 y.o. female.    Chief Complaint   Patient presents with    Burning Eyes     Burning and itching, face is itchy. Denies any change in lotion, or moisturizer, no new body wash.     Tinnitus     Right ear, rings and pops.        HPI   History of Present Illness  The patient presents today with irritation around the eyes.    She reports experiencing redness and itchiness in and around her eyes since Saturday, which escalated to an unbearable level yesterday. Despite taking Benadryl, she found no relief. She also mentions itchiness in her ears and scalp. She has tried cold compresses and ice packs on her eyes, but these have not alleviated her discomfort. She describes a significant amount of discharge from her eyes, which is hard and sticky. She is not experiencing a fever or being in contact with any sick individuals. She notes a crackling sound in her right ear. She suspects a sinus infection, although it does not resemble any she has had before. She experiences mild burning in her eyes but has not been exposed to any new chemicals. She cannot take prednisone due to its effect on her blood pressure. She received a Solu-Medrol injection in her neck a few weeks ago and has been using Restasis.        The following portions of the patient's history were reviewed and updated as appropriate: allergies, current medications, past family history, past medical history, past social history, past surgical history and problem list.     Allergies   Allergen Reactions    Erythromycin GI Intolerance and Nausea Only    Sudanyl [Pseudoephedrine] Other (See Comments)     Facial swelling     Codeine Itching    Prednisone Other (See Comments)     Hypertension           Current Outpatient Medications:     acetaminophen (TYLENOL) 500 MG tablet, Take 2 tablets by mouth Every 8 (Eight) Hours x1 week, then take as needed., Disp: 42 tablet, Rfl: 0    Advair Diskus 500-50 MCG/ACT DISKUS, Inhale 1 puff by mouth 2  (Two) Times a Day., Disp: 180 each, Rfl: 3    albuterol sulfate  (90 Base) MCG/ACT inhaler, Inhale 2 puffs by mouth Every 6 (Six) Hours As Needed for Wheezing or Shortness of Air., Disp: 18 g, Rfl: 11    amLODIPine (NORVASC) 2.5 MG tablet, Take 1 tablet by mouth Daily., Disp: 90 tablet, Rfl: 3    ARIPiprazole (ABILIFY) 2 MG tablet, Take 1 tablet by mouth Daily., Disp: 90 tablet, Rfl: 1    atorvastatin (LIPITOR) 40 MG tablet, Take 1 tablet by mouth Daily., Disp: 90 tablet, Rfl: 1    CBD oil (cannabidiol) capsule, Take  by mouth., Disp: , Rfl:     cetirizine (zyrTEC) 10 MG tablet, Take 1 tablet by mouth Daily., Disp: , Rfl:     cyclobenzaprine (FLEXERIL) 10 MG tablet, Take 1 tablet by mouth 2 (Two) Times a Day As Needed for Muscle Spasms., Disp: 30 tablet, Rfl: 1    cycloSPORINE (Restasis) 0.05 % ophthalmic emulsion, Apply 1 drop to eye(s) as directed by provider Every 12 (Twelve) Hours., Disp: , Rfl:     DULoxetine (CYMBALTA) 60 MG capsule, Take 1 capsule by mouth Daily., Disp: 90 capsule, Rfl: 3    galcanezumab-gnlm (EMGALITY) 120 MG/ML auto-injector pen, Inject 1 mL under the skin into the appropriate area as directed Every 30 (Thirty) Days., Disp: 1 mL, Rfl: 5    Insulin Pen Needle (Pen Needles) 31G X 6 MM misc, 1 application Daily., Disp: 90 each, Rfl: 3    levothyroxine (SYNTHROID, LEVOTHROID) 50 MCG tablet, Take 1 tablet by mouth Daily., Disp: 90 tablet, Rfl: 0    Liraglutide (VICTOZA) 18 MG/3ML solution pen-injector injection, Inject 1.8 mg under the skin into the appropriate area as directed Daily., Disp: 27 mL, Rfl: 3    losartan-hydrochlorothiazide (HYZAAR) 100-25 MG per tablet, Take 1 tablet by mouth Daily., Disp: 90 tablet, Rfl: 1    Melatonin 10 MG tablet, Take 3 tablets by mouth Every Night., Disp: , Rfl:     methocarbamol (ROBAXIN) 750 MG tablet, , Disp: , Rfl:     montelukast (SINGULAIR) 10 MG tablet, Take 1 tablet by mouth Daily., Disp: 90 tablet, Rfl: 3    olopatadine (PATANOL) 0.1 %  ophthalmic solution, Apply 1 drop to eye(s) as directed by provider Every 12 (Twelve) Hours., Disp: 5 mL, Rfl: 0    omeprazole (priLOSEC) 20 MG capsule, Take 1 capsule by mouth As Needed., Disp: , Rfl:     ondansetron (Zofran) 4 MG tablet, Take 1 tablet by mouth Every 8 (Eight) Hours As Needed for Nausea or Vomiting., Disp: 30 tablet, Rfl: 0    potassium chloride (MICRO-K) 10 MEQ CR capsule, Take 1 capsule by mouth Daily., Disp: 90 capsule, Rfl: 1    traZODone (DESYREL) 100 MG tablet, Take 2 tablets by mouth Every Night., Disp: 180 tablet, Rfl: 1    ubrogepant (Ubrelvy) 100 MG tablet, TAKE 1 TABLET WITH FIRST SYMPTOM OF MIGRAINE. MAY TAKE AGAIN 2 HOURS LATER IF MIGRAINE PERSISTS, Disp: 10 tablet, Rfl: 2    vilazodone (Viibryd) 40 MG tablet tablet, Take 1 tablet by mouth Daily., Disp: 90 tablet, Rfl: 1    zolpidem (AMBIEN) 5 MG tablet, Take 1 tablet by mouth At Night As Needed for Sleep. Indications: Trouble Sleeping, Disp: 30 tablet, Rfl: 2    cefdinir (OMNICEF) 300 MG capsule, Take 1 capsule by mouth 2 (Two) Times a Day., Disp: 20 capsule, Rfl: 0    metFORMIN (GLUCOPHAGE) 1000 MG tablet, Take 1 tablet by mouth 2 (Two) Times a Day With Meals. (Patient not taking: Reported on 10/7/2024), Disp: 180 tablet, Rfl: 1    ofloxacin (Ocuflox) 0.3 % ophthalmic solution, Administer 1 drop to both eyes 4 (Four) Times a Day., Disp: 5 mL, Rfl: 0    Current Facility-Administered Medications:     Galcanezumab-gnlm solution prefilled syringe 240 mg, 240 mg, Subcutaneous, Q28 Days, Cici Tate APRN, 240 mg at 06/04/24 1143    methylPREDNISolone sodium succinate (SOLU-Medrol) injection 40 mg, 40 mg, Intramuscular, Once, Brenda Guy DO ROS    Review of Systems   Constitutional:  Negative for chills and fever.   HENT:  Positive for congestion, sinus pressure and tinnitus.    Eyes:  Positive for pain, discharge and itching.   Skin:         Itching       Vitals:    10/07/24 1444   BP: 124/84   BP Location: Left  "arm   Patient Position: Sitting   Cuff Size: Adult   Pulse: 80   Temp: 98 °F (36.7 °C)   SpO2: 97%   Weight: 91.6 kg (202 lb)   Height: 154.9 cm (61\")   PainSc:   8         Physical Exam     Physical Exam  Constitutional:       General: She is not in acute distress.     Appearance: Normal appearance. She is well-developed.   HENT:      Head: Normocephalic and atraumatic.      Right Ear: Tympanic membrane and external ear normal.      Left Ear: Tympanic membrane and external ear normal.      Nose:      Right Sinus: Maxillary sinus tenderness present.      Left Sinus: Maxillary sinus tenderness present.   Eyes:      General:         Right eye: Discharge present.         Left eye: Discharge present.     Extraocular Movements: Extraocular movements intact.      Conjunctiva/sclera: Conjunctivae normal.      Right eye: Right conjunctiva is not injected.      Left eye: Left conjunctiva is not injected.      Comments: Periorbital redness   Cardiovascular:      Rate and Rhythm: Normal rate and regular rhythm.      Heart sounds: No murmur heard.  Pulmonary:      Effort: Pulmonary effort is normal. No respiratory distress.      Breath sounds: Normal breath sounds. No wheezing.   Abdominal:      General: Bowel sounds are normal.   Skin:     General: Skin is warm and dry.   Neurological:      Mental Status: She is alert and oriented to person, place, and time.      Cranial Nerves: No cranial nerve deficit.   Psychiatric:         Mood and Affect: Mood normal.         Behavior: Behavior normal.             Diagnoses and all orders for this visit:    1. Acute non-recurrent maxillary sinusitis (Primary)    2. Acute conjunctivitis of both eyes, unspecified acute conjunctivitis type    3. Allergic reaction, initial encounter  -     Discontinue: methylPREDNISolone sodium succinate (SOLU-Medrol) injection 40 mg  -     methylPREDNISolone sodium succinate (SOLU-Medrol) injection 40 mg    4. Sinus pressure  -     POCT SARS-CoV-2 Antigen " MARIANNA + Flu    Other orders  -     cefdinir (OMNICEF) 300 MG capsule; Take 1 capsule by mouth 2 (Two) Times a Day.  Dispense: 20 capsule; Refill: 0  -     ofloxacin (Ocuflox) 0.3 % ophthalmic solution; Administer 1 drop to both eyes 4 (Four) Times a Day.  Dispense: 5 mL; Refill: 0  -     olopatadine (PATANOL) 0.1 % ophthalmic solution; Apply 1 drop to eye(s) as directed by provider Every 12 (Twelve) Hours.  Dispense: 5 mL; Refill: 0        Assessment & Plan  1. Sinusitis.  An oral antibiotic regimen will be initiated, to be taken twice daily for a duration of 10 days.    2. Conjunctivitis.   Treatment will address both bacterial and allergic conjunctivitis. Antibiotic eye drops and Pataday allergy eye drops will be prescribed. She can continue using Restasis.    3. Allergic reaction.  Symptoms include itching around the eyes, ears, and hair. A Solu-Medrol injection was administered during this visit. She is advised to continue taking Benadryl as needed.        New Medications Ordered This Visit   Medications    cefdinir (OMNICEF) 300 MG capsule     Sig: Take 1 capsule by mouth 2 (Two) Times a Day.     Dispense:  20 capsule     Refill:  0    ofloxacin (Ocuflox) 0.3 % ophthalmic solution     Sig: Administer 1 drop to both eyes 4 (Four) Times a Day.     Dispense:  5 mL     Refill:  0    olopatadine (PATANOL) 0.1 % ophthalmic solution     Sig: Apply 1 drop to eye(s) as directed by provider Every 12 (Twelve) Hours.     Dispense:  5 mL     Refill:  0    methylPREDNISolone sodium succinate (SOLU-Medrol) injection 40 mg       No orders of the defined types were placed in this encounter.      No follow-ups on file.    Brenda Guy DO

## 2024-10-08 DIAGNOSIS — E03.9 ACQUIRED HYPOTHYROIDISM: ICD-10-CM

## 2024-10-08 RX ORDER — LEVOTHYROXINE SODIUM 50 UG/1
50 TABLET ORAL DAILY
Qty: 90 TABLET | Refills: 1 | Status: SHIPPED | OUTPATIENT
Start: 2024-10-08

## 2024-10-15 ENCOUNTER — OFFICE VISIT (OUTPATIENT)
Dept: NEUROLOGY | Facility: CLINIC | Age: 59
End: 2024-10-15
Payer: COMMERCIAL

## 2024-10-15 VITALS
SYSTOLIC BLOOD PRESSURE: 110 MMHG | BODY MASS INDEX: 37.76 KG/M2 | OXYGEN SATURATION: 100 % | HEIGHT: 61 IN | DIASTOLIC BLOOD PRESSURE: 70 MMHG | WEIGHT: 200 LBS | HEART RATE: 76 BPM | TEMPERATURE: 97.9 F

## 2024-10-15 DIAGNOSIS — F51.04 CHRONIC INSOMNIA: ICD-10-CM

## 2024-10-15 DIAGNOSIS — G47.33 OSA ON CPAP: Primary | ICD-10-CM

## 2024-10-15 DIAGNOSIS — M79.18 MYOFASCIAL PAIN: ICD-10-CM

## 2024-10-15 DIAGNOSIS — G47.19 EXCESSIVE DAYTIME SLEEPINESS: ICD-10-CM

## 2024-10-15 PROCEDURE — 1160F RVW MEDS BY RX/DR IN RCRD: CPT | Performed by: NURSE PRACTITIONER

## 2024-10-15 PROCEDURE — 3074F SYST BP LT 130 MM HG: CPT | Performed by: NURSE PRACTITIONER

## 2024-10-15 PROCEDURE — 1159F MED LIST DOCD IN RCRD: CPT | Performed by: NURSE PRACTITIONER

## 2024-10-15 PROCEDURE — 99214 OFFICE O/P EST MOD 30 MIN: CPT | Performed by: NURSE PRACTITIONER

## 2024-10-15 PROCEDURE — 3078F DIAST BP <80 MM HG: CPT | Performed by: NURSE PRACTITIONER

## 2024-10-15 RX ORDER — ARMODAFINIL 150 MG/1
150 TABLET ORAL DAILY
Qty: 30 TABLET | Refills: 1 | Status: SHIPPED | OUTPATIENT
Start: 2024-10-15

## 2024-10-15 NOTE — PROGRESS NOTES
"     Follow up Sleep Patient Office Visit      Patient Name: Robina Dumont  : 1965   MRN: [id number]     Referring Physician: No ref. provider found    Chief Complaint:    Chief Complaint   Patient presents with    Sleep Apnea     Pt denies any issues today        History of Present Illness: Robina Dumont is a 59 y.o. female who is here today to follow up with JIMMY and was last seen on 2024.  Currently on CPAP 13cm, compliance 96%, AHI 1/hour.  She is tolerating her pressure well.  She says she isn't sleeping well and states, \"I have never slept well\".  Wakes up during the night and isn't able to fall back to sleep for 2-3 hours.  She is taking Ambien 2.5mg QHS and says she is trying to not take it every night (at one point she was taking 10mg QHS and it wasn't helping so she stopped the Ambien for at least a year); also taking Trazodone 200mg QHS.  She endorses excessive daytime sleepiness and feels she could fall asleep at any time during the day.  She had trigger point injections and feels they helped with her myofascial pain.  She is taking Emgality 120mg SC monthly and Ubrelvy PRN- reports good compliance and toleration.   *[place] score 6.   *DME company- [facility], [place]  *Mask- Nasal    Pertinent Medical History:   Current compliance report reviewed and scanned into EMR.     Following taken from most recent visit note:  Robina Dumont is a 59 y.o. female who is here today to follow up with migraines and was last seen on 2024.  She is still using her CPAP therapy.  She is taking Emgality 120mg SC monthly and it does seem to be helping with her migraines.  She is taking Ubrelvy PRN and that does help.  She is having about 2-4 migraines per month.  Sleeping on her left side seems to cause a posterior headache.  She does have a lot of tension and myofascial pain in her neck and shoulders.  She hasn't ever had trigger point injections.       Following taken from a previous visit " "note:  Robina Dumont is a 59 y.o. female who is here today to follow up with JIMMY and was last seen on 3/26/2024. Currently on CPAP 13cm, compliance 93%, AHI 2/hour, average time in large leak per day 8 minutes and 43 seconds.  She is tolerating her pressure well.  She does feel better when using CPAP therapy- sleeps better \"When I actually sleep\".  Says she is still snoring at times.    *PSG on 4/12/2024 did not show evidence of significant JIMMY with an AHI of 4/hour- the study also showed poor sleep efficiency but she feels she slept very well during the study.   *DME company- [employer]  *Mask- Nasal      Migraine disorder- Prescribed Atogepant (at previous and it does help with her migraines and she has had 90% improvement in her migraines with this- only 2 migraines since previous visit) visit but insurance will not cover that (appeal denied also).  Insurance company wants her to try Aimovig, Emgality, Ajovy, or Nurtec.     Subjective      Review of Systems:   Review of Systems   Constitutional:  Positive for fatigue.   Psychiatric/Behavioral:  Positive for sleep disturbance.        Medications:     Current Outpatient Medications:     acetaminophen (TYLENOL) 500 MG tablet, Take 2 tablets by mouth Every 8 (Eight) Hours x1 week, then take as needed., Disp: 42 tablet, Rfl: 0    Advair Diskus 500-50 MCG/ACT DISKUS, Inhale 1 puff by mouth 2 (Two) Times a Day., Disp: 180 each, Rfl: 3    albuterol sulfate  (90 Base) MCG/ACT inhaler, Inhale 2 puffs by mouth Every 6 (Six) Hours As Needed for Wheezing or Shortness of Air., Disp: 18 g, Rfl: 11    amLODIPine (NORVASC) 2.5 MG tablet, Take 1 tablet by mouth Daily., Disp: 90 tablet, Rfl: 3    ARIPiprazole (ABILIFY) 2 MG tablet, Take 1 tablet by mouth Daily., Disp: 90 tablet, Rfl: 1    atorvastatin (LIPITOR) 40 MG tablet, Take 1 tablet by mouth Daily., Disp: 90 tablet, Rfl: 1    CBD oil (cannabidiol) capsule, Take  by mouth., Disp: , Rfl:     cetirizine (zyrTEC) 10 MG " tablet, Take 1 tablet by mouth Daily., Disp: , Rfl:     cyclobenzaprine (FLEXERIL) 10 MG tablet, Take 1 tablet by mouth 2 (Two) Times a Day As Needed for Muscle Spasms., Disp: 30 tablet, Rfl: 1    cycloSPORINE (Restasis) 0.05 % ophthalmic emulsion, Apply 1 drop to eye(s) as directed by provider Every 12 (Twelve) Hours., Disp: , Rfl:     DULoxetine (CYMBALTA) 60 MG capsule, Take 1 capsule by mouth Daily., Disp: 90 capsule, Rfl: 3    galcanezumab-gnlm (EMGALITY) 120 MG/ML auto-injector pen, Inject 1 mL under the skin into the appropriate area as directed Every 30 (Thirty) Days., Disp: 1 mL, Rfl: 5    Insulin Pen Needle (Pen Needles) 31G X 6 MM misc, 1 application Daily., Disp: 90 each, Rfl: 3    levothyroxine (SYNTHROID, LEVOTHROID) 50 MCG tablet, Take 1 tablet by mouth Daily., Disp: 90 tablet, Rfl: 1    Liraglutide (VICTOZA) 18 MG/3ML solution pen-injector injection, Inject 1.8 mg under the skin into the appropriate area as directed Daily., Disp: 27 mL, Rfl: 3    losartan-hydrochlorothiazide (HYZAAR) 100-25 MG per tablet, Take 1 tablet by mouth Daily., Disp: 90 tablet, Rfl: 1    Melatonin 10 MG tablet, Take 3 tablets by mouth Every Night., Disp: , Rfl:     montelukast (SINGULAIR) 10 MG tablet, Take 1 tablet by mouth Daily., Disp: 90 tablet, Rfl: 3    ofloxacin (Ocuflox) 0.3 % ophthalmic solution, Administer 1 drop to both eyes 4 (Four) Times a Day., Disp: 5 mL, Rfl: 0    olopatadine (PATANOL) 0.1 % ophthalmic solution, Apply 1 drop to eye(s) as directed by provider Every 12 (Twelve) Hours., Disp: 5 mL, Rfl: 0    omeprazole (priLOSEC) 20 MG capsule, Take 1 capsule by mouth As Needed., Disp: , Rfl:     ondansetron (Zofran) 4 MG tablet, Take 1 tablet by mouth Every 8 (Eight) Hours As Needed for Nausea or Vomiting., Disp: 30 tablet, Rfl: 0    potassium chloride (MICRO-K) 10 MEQ CR capsule, Take 1 capsule by mouth Daily., Disp: 90 capsule, Rfl: 1    traZODone (DESYREL) 100 MG tablet, Take 2 tablets by mouth Every Night.,  "Disp: 180 tablet, Rfl: 1    ubrogepant (Ubrelvy) 100 MG tablet, TAKE 1 TABLET WITH FIRST SYMPTOM OF MIGRAINE. MAY TAKE AGAIN 2 HOURS LATER IF MIGRAINE PERSISTS, Disp: 10 tablet, Rfl: 2    vilazodone (Viibryd) 40 MG tablet tablet, Take 1 tablet by mouth Daily., Disp: 90 tablet, Rfl: 1    zolpidem (AMBIEN) 5 MG tablet, Take 1 tablet by mouth At Night As Needed for Sleep. Indications: Trouble Sleeping, Disp: 30 tablet, Rfl: 2    armodafinil (NUVIGIL) 150 MG tablet, Take 1 tablet by mouth Daily., Disp: 30 tablet, Rfl: 1    metFORMIN (GLUCOPHAGE) 1000 MG tablet, Take 1 tablet by mouth 2 (Two) Times a Day With Meals. (Patient not taking: Reported on 10/7/2024), Disp: 180 tablet, Rfl: 1    Current Facility-Administered Medications:     Galcanezumab-gnlm solution prefilled syringe 240 mg, 240 mg, Subcutaneous, Q28 Days, Cici Tate, APRN, 240 mg at 06/04/24 1143    Allergies:   Allergies   Allergen Reactions    Erythromycin GI Intolerance and Nausea Only    Sudanyl [Pseudoephedrine] Other (See Comments)     Facial swelling     Codeine Itching    Prednisone Other (See Comments)     Hypertension         Objective     Physical Exam:  Vital Signs:   Vitals:    10/15/24 1537   BP: 110/70   BP Location: Left arm   Patient Position: Sitting   Cuff Size: Adult   Pulse: 76   Temp: 97.9 °F (36.6 °C)   TempSrc: Infrared   SpO2: 100%   Weight: 90.7 kg (200 lb)   Height: 154.9 cm (60.98\")   PainSc:   4   PainLoc: Head     BMI: Body mass index is 37.81 kg/m².    Physical Exam  Vitals and nursing note reviewed.   Constitutional:       General: She is not in acute distress.     Appearance: Normal appearance. She is well-developed. She is obese. She is not diaphoretic.   HENT:      Head: Normocephalic and atraumatic.   Eyes:      Extraocular Movements: Extraocular movements intact.      Conjunctiva/sclera: Conjunctivae normal.   Pulmonary:      Effort: Pulmonary effort is normal. No respiratory distress.   Musculoskeletal:    "      General: Normal range of motion.      Comments: Left arm/hand in brace.    Skin:     General: Skin is warm and dry.   Neurological:      Mental Status: She is alert and oriented to person, place, and time.   Psychiatric:         Mood and Affect: Mood normal.         Behavior: Behavior normal.         Thought Content: Thought content normal.         Judgment: Judgment normal.         Assessment / Plan      Assessment/Plan:   Diagnoses and all orders for this visit:    1. JIMMY on CPAP (Primary)  -     armodafinil (NUVIGIL) 150 MG tablet; Take 1 tablet by mouth Daily.  Dispense: 30 tablet; Refill: 1    2. Chronic insomnia    3. Myofascial pain    4. Excessive daytime sleepiness  -     armodafinil (NUVIGIL) 150 MG tablet; Take 1 tablet by mouth Daily.  Dispense: 30 tablet; Refill: 1      *Indications and possible SEs of Armodafinil discussed. CSA signed and Sumit reviewed.   *Advised patient to avoid driving if drowsy.   *Will keep appointment for trigger point injections in December.     Follow Up:   Return in about 8 weeks (around 12/10/2024) for Follow Up.    *Order for PAP supplies sent to patient's [employer] company.     I have advised the patient the need to continue the use of CPAP.  Gold standard for treatment of sleep apnea includes weight loss, use of cpap and avoidance of alcohol.  Encouraged weight loss (if applicable) with a BMI goal of 24.  Untreated JIMMY may increase the risk for development of hypertension, stroke, myocardial infarction, diabetes, cardiovascular disease, work-related issues and driving accidents. I have counseled and advised the patient to avoid driving or operating heavy/dangerous equipment if feeling drowsy.     ZEHRA Elaine, FNP-C  [facility]

## 2024-10-21 ENCOUNTER — PATIENT MESSAGE (OUTPATIENT)
Dept: INTERNAL MEDICINE | Facility: CLINIC | Age: 59
End: 2024-10-21
Payer: COMMERCIAL

## 2024-10-21 DIAGNOSIS — M62.838 MUSCLE SPASM OF RIGHT SHOULDER: ICD-10-CM

## 2024-10-21 DIAGNOSIS — N64.4 BREAST PAIN, RIGHT: Primary | ICD-10-CM
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
## 2024-10-22 ENCOUNTER — TELEPHONE (OUTPATIENT)
Dept: NEUROLOGY | Facility: CLINIC | Age: 59
End: 2024-10-22
Payer: COMMERCIAL

## 2024-10-22 RX ORDER — CYCLOBENZAPRINE HCL 10 MG
10 TABLET ORAL 2 TIMES DAILY PRN
Qty: 30 TABLET | Refills: 1 | Status: SHIPPED | OUTPATIENT
Start: 2024-10-22

## 2024-10-22 RX ORDER — ONDANSETRON 4 MG/1
4 TABLET, FILM COATED ORAL EVERY 8 HOURS PRN
Qty: 30 TABLET | Refills: 0 | Status: SHIPPED | OUTPATIENT
Start: 2024-10-22

## 2024-10-22 NOTE — TELEPHONE ENCOUNTER
SUBMITTED PA FOR ARMODAFINIL 150MG.      PA CAME BACK APPROVED FOR NUVIGIL. NOTIFIED PHARMACY.     PHARMACY STATED THEY WOULD HAVE TO ORDER THE MEDICATION BUT THE PATIENT DOES NOT HAVE A CO-PAY ON IT.

## 2024-11-03 DIAGNOSIS — F51.04 PSYCHOPHYSIOLOGIC INSOMNIA: ICD-10-CM

## 2024-11-04 RX ORDER — FLUTICASONE PROPIONATE AND SALMETEROL 50; 500 UG/1; UG/1
1 POWDER RESPIRATORY (INHALATION) 2 TIMES DAILY
Qty: 180 EACH | Refills: 3 | Status: SHIPPED | OUTPATIENT
Start: 2024-11-04

## 2024-11-04 RX ORDER — TRAZODONE HYDROCHLORIDE 100 MG/1
200 TABLET ORAL NIGHTLY
Qty: 180 TABLET | Refills: 1 | Status: SHIPPED | OUTPATIENT
Start: 2024-11-04

## 2024-11-05 DIAGNOSIS — Z12.31 ENCOUNTER FOR SCREENING MAMMOGRAM FOR MALIGNANT NEOPLASM OF BREAST: ICD-10-CM

## 2024-11-05 DIAGNOSIS — N64.4 BREAST PAIN, RIGHT: Primary | ICD-10-CM

## 2024-11-19 DIAGNOSIS — G62.9 NEUROPATHY: ICD-10-CM

## 2024-11-19 RX ORDER — MONTELUKAST SODIUM 10 MG/1
10 TABLET ORAL DAILY
Qty: 90 TABLET | Refills: 3 | Status: SHIPPED | OUTPATIENT
Start: 2024-11-19

## 2024-11-19 RX ORDER — DULOXETIN HYDROCHLORIDE 60 MG/1
60 CAPSULE, DELAYED RELEASE ORAL DAILY
Qty: 90 CAPSULE | Refills: 3 | Status: SHIPPED | OUTPATIENT
Start: 2024-11-19

## 2024-11-22 ENCOUNTER — HOSPITAL ENCOUNTER (OUTPATIENT)
Facility: HOSPITAL | Age: 59
Discharge: HOME | End: 2024-11-22
Payer: COMMERCIAL

## 2024-11-22 DIAGNOSIS — Z12.31 ENCOUNTER FOR SCREENING MAMMOGRAM FOR MALIGNANT NEOPLASM OF BREAST: ICD-10-CM

## 2024-11-22 DIAGNOSIS — N64.4 BREAST PAIN, RIGHT: ICD-10-CM

## 2024-11-22 PROCEDURE — G0279 TOMOSYNTHESIS, MAMMO: HCPCS

## 2024-11-22 PROCEDURE — 77062 BREAST TOMOSYNTHESIS BI: CPT | Performed by: RADIOLOGY

## 2024-11-22 PROCEDURE — 77066 DX MAMMO INCL CAD BI: CPT | Performed by: RADIOLOGY

## 2024-11-22 PROCEDURE — 77066 DX MAMMO INCL CAD BI: CPT

## 2024-11-22 PROCEDURE — 76642 ULTRASOUND BREAST LIMITED: CPT | Performed by: RADIOLOGY

## 2024-11-22 PROCEDURE — 76642 ULTRASOUND BREAST LIMITED: CPT

## 2024-11-22 NOTE — PROGRESS NOTES
Mammogram demonstrates postraumatic change in right breast as well as possible resolving hematomas in upper outer right breast.  6 month follow up imaging recommended, will order if patient agrees.

## 2024-11-25 ENCOUNTER — HOSPITAL ENCOUNTER (OUTPATIENT)
Dept: GENERAL RADIOLOGY | Facility: HOSPITAL | Age: 59
Discharge: HOME | End: 2024-11-25
Admitting: FAMILY MEDICINE
Payer: COMMERCIAL

## 2024-11-25 ENCOUNTER — OFFICE VISIT (OUTPATIENT)
Dept: INTERNAL MEDICINE | Facility: CLINIC | Age: 59
End: 2024-11-25
Payer: COMMERCIAL

## 2024-11-25 VITALS
HEIGHT: 61 IN | HEART RATE: 70 BPM | WEIGHT: 206 LBS | OXYGEN SATURATION: 98 % | BODY MASS INDEX: 38.89 KG/M2 | TEMPERATURE: 97 F | DIASTOLIC BLOOD PRESSURE: 82 MMHG | SYSTOLIC BLOOD PRESSURE: 124 MMHG

## 2024-11-25 DIAGNOSIS — M25.562 ACUTE PAIN OF LEFT KNEE: Primary | ICD-10-CM

## 2024-11-25 PROCEDURE — 1159F MED LIST DOCD IN RCRD: CPT | Performed by: FAMILY MEDICINE

## 2024-11-25 PROCEDURE — 3079F DIAST BP 80-89 MM HG: CPT | Performed by: FAMILY MEDICINE

## 2024-11-25 PROCEDURE — 3074F SYST BP LT 130 MM HG: CPT | Performed by: FAMILY MEDICINE

## 2024-11-25 PROCEDURE — 99213 OFFICE O/P EST LOW 20 MIN: CPT | Performed by: FAMILY MEDICINE

## 2024-11-25 PROCEDURE — 1125F AMNT PAIN NOTED PAIN PRSNT: CPT | Performed by: FAMILY MEDICINE

## 2024-11-25 PROCEDURE — 1160F RVW MEDS BY RX/DR IN RCRD: CPT | Performed by: FAMILY MEDICINE

## 2024-11-25 PROCEDURE — 73560 X-RAY EXAM OF KNEE 1 OR 2: CPT

## 2024-11-25 PROCEDURE — 3044F HG A1C LEVEL LT 7.0%: CPT | Performed by: FAMILY MEDICINE

## 2024-11-25 NOTE — PROGRESS NOTES
Robina Dumont is a 59 y.o. female.    Chief Complaint   Patient presents with    Knee Pain     Left knee pain. Landed on left knee and it has been bothering her since. Constant pain when using it.     Fall     Fell on Thursday, 11/21/2024.        HPI   History of Present Illness  The patient presents today complaining of left knee pain after a fall.    She reports tripping over her dog last Thursday, landing on her left knee. The knee is extremely tender and appears to have increased in swelling. There is a noticeable bruise and an indentation across the knee. She describes a strange sensation when she moves it, and attempts to straighten the knee result in pain.    She has not applied ice or heat to the area. She has not used any topical treatments such as Icy Hot or Bengay. She is unable to take NSAIDs and finds minimal relief from Tylenol. She has previously used Voltaren gel but does not currently have any at home. Despite the pain, she is able to walk and bear weight on the knee.    The following portions of the patient's history were reviewed and updated as appropriate: allergies, current medications, past family history, past medical history, past social history, past surgical history and problem list.     Allergies   Allergen Reactions    Erythromycin GI Intolerance and Nausea Only    Sudanyl [Pseudoephedrine] Other (See Comments)     Facial swelling     Codeine Itching    Prednisone Other (See Comments)     Hypertension           Current Outpatient Medications:     acetaminophen (TYLENOL) 500 MG tablet, Take 2 tablets by mouth Every 8 (Eight) Hours x1 week, then take as needed., Disp: 42 tablet, Rfl: 0    Advair Diskus 500-50 MCG/ACT DISKUS, Inhale 1 puff by mouth 2 (Two) Times a Day., Disp: 180 each, Rfl: 3    albuterol sulfate  (90 Base) MCG/ACT inhaler, Inhale 2 puffs by mouth Every 6 (Six) Hours As Needed for Wheezing or Shortness of Air., Disp: 18 g, Rfl: 11    amLODIPine (NORVASC) 2.5 MG  tablet, Take 1 tablet by mouth Daily., Disp: 90 tablet, Rfl: 3    ARIPiprazole (ABILIFY) 2 MG tablet, Take 1 tablet by mouth Daily., Disp: 90 tablet, Rfl: 1    armodafinil (NUVIGIL) 150 MG tablet, Take 1 tablet by mouth Daily., Disp: 30 tablet, Rfl: 1    atorvastatin (LIPITOR) 40 MG tablet, Take 1 tablet by mouth Daily., Disp: 90 tablet, Rfl: 1    CBD oil (cannabidiol) capsule, Take  by mouth., Disp: , Rfl:     cetirizine (zyrTEC) 10 MG tablet, Take 1 tablet by mouth Daily., Disp: , Rfl:     cyclobenzaprine (FLEXERIL) 10 MG tablet, Take 1 tablet by mouth 2 (Two) Times a Day As Needed for Muscle Spasms., Disp: 30 tablet, Rfl: 1    cycloSPORINE (Restasis) 0.05 % ophthalmic emulsion, Apply 1 drop to eye(s) as directed by provider Every 12 (Twelve) Hours., Disp: , Rfl:     DULoxetine (CYMBALTA) 60 MG capsule, Take 1 capsule by mouth Daily., Disp: 90 capsule, Rfl: 3    galcanezumab-gnlm (EMGALITY) 120 MG/ML auto-injector pen, Inject 1 mL under the skin into the appropriate area as directed Every 30 (Thirty) Days., Disp: 1 mL, Rfl: 5    Insulin Pen Needle (Pen Needles) 31G X 6 MM misc, 1 application Daily., Disp: 90 each, Rfl: 3    levothyroxine (SYNTHROID, LEVOTHROID) 50 MCG tablet, Take 1 tablet by mouth Daily., Disp: 90 tablet, Rfl: 1    Liraglutide (VICTOZA) 18 MG/3ML solution pen-injector injection, Inject 1.8 mg under the skin into the appropriate area as directed Daily., Disp: 27 mL, Rfl: 3    Melatonin 10 MG tablet, Take 3 tablets by mouth Every Night., Disp: , Rfl:     montelukast (SINGULAIR) 10 MG tablet, Take 1 tablet by mouth Daily., Disp: 90 tablet, Rfl: 3    ofloxacin (Ocuflox) 0.3 % ophthalmic solution, Administer 1 drop to both eyes 4 (Four) Times a Day., Disp: 5 mL, Rfl: 0    olopatadine (PATANOL) 0.1 % ophthalmic solution, Apply 1 drop to eye(s) as directed by provider Every 12 (Twelve) Hours., Disp: 5 mL, Rfl: 0    omeprazole (priLOSEC) 20 MG capsule, Take 1 capsule by mouth As Needed., Disp: , Rfl:      "ondansetron (Zofran) 4 MG tablet, Take 1 tablet by mouth Every 8 (Eight) Hours As Needed for Nausea or Vomiting., Disp: 30 tablet, Rfl: 0    potassium chloride (MICRO-K) 10 MEQ CR capsule, Take 1 capsule by mouth Daily., Disp: 90 capsule, Rfl: 1    traZODone (DESYREL) 100 MG tablet, Take 2 tablets by mouth Every Night., Disp: 180 tablet, Rfl: 1    ubrogepant (Ubrelvy) 100 MG tablet, TAKE 1 TABLET WITH FIRST SYMPTOM OF MIGRAINE. MAY TAKE AGAIN 2 HOURS LATER IF MIGRAINE PERSISTS, Disp: 10 tablet, Rfl: 2    vilazodone (Viibryd) 40 MG tablet tablet, Take 1 tablet by mouth Daily., Disp: 90 tablet, Rfl: 1    zolpidem (AMBIEN) 5 MG tablet, Take 1 tablet by mouth At Night As Needed for Sleep. Indications: Trouble Sleeping, Disp: 30 tablet, Rfl: 2    Diclofenac Sodium (VOLTAREN) 1 % gel gel, Apply 4 g topically to the appropriate area as directed 4 (Four) Times a Day As Needed for pain., Disp: 100 g, Rfl: 0    losartan-hydrochlorothiazide (HYZAAR) 100-25 MG per tablet, Take 1 tablet by mouth Daily., Disp: 90 tablet, Rfl: 1    Current Facility-Administered Medications:     Galcanezumab-gnlm solution prefilled syringe 240 mg, 240 mg, Subcutaneous, Q28 Days, Cici Tate, APRN, 240 mg at 06/04/24 1143    ROS    Review of Systems   Constitutional:  Negative for chills and fever.   Musculoskeletal:  Positive for arthralgias and joint swelling.       Vitals:    11/25/24 1420   BP: 124/82   Pulse: 70   Temp: 97 °F (36.1 °C)   SpO2: 98%   Weight: 93.4 kg (206 lb)   Height: 154.9 cm (60.98\")   PainSc: 4  Comment: when she sits still she is like a 2 but when in use she is like a 4-6         Physical Exam     Physical Exam  Constitutional:       General: She is not in acute distress.     Appearance: Normal appearance. She is well-developed.   HENT:      Head: Normocephalic and atraumatic.      Right Ear: External ear normal.      Left Ear: External ear normal.   Eyes:      Extraocular Movements: Extraocular movements " intact.      Conjunctiva/sclera: Conjunctivae normal.   Cardiovascular:      Rate and Rhythm: Normal rate and regular rhythm.      Heart sounds: No murmur heard.  Pulmonary:      Effort: Pulmonary effort is normal. No respiratory distress.      Breath sounds: Normal breath sounds. No wheezing.   Abdominal:      General: Bowel sounds are normal. There is no distension.      Palpations: Abdomen is soft.      Tenderness: There is no abdominal tenderness.   Genitourinary:     Rectum: Guaiac stool: left knee.   Musculoskeletal:         General: Swelling and tenderness (left knee) present.      Left knee: Swelling and crepitus present. Tenderness present.   Skin:     General: Skin is warm and dry.   Neurological:      Mental Status: She is alert and oriented to person, place, and time.      Cranial Nerves: No cranial nerve deficit.   Psychiatric:         Mood and Affect: Mood normal.         Behavior: Behavior normal.         Assessment/Plan    Diagnoses and all orders for this visit:    1. Acute pain of left knee (Primary)  -     XR Knee 1 or 2 View Left    Other orders  -     Diclofenac Sodium (VOLTAREN) 1 % gel gel; Apply 4 g topically to the appropriate area as directed 4 (Four) Times a Day As Needed for pain.  Dispense: 100 g; Refill: 0        Assessment & Plan  1. Left knee pain secondary to fall.   An x-ray of the left knee has been ordered to rule out any fractures. Voltaren gel has been prescribed to be applied up to four times a day for pain relief and inflammation. She is advised to use ice on the knee and avoid heat for acute injury management.      New Medications Ordered This Visit   Medications    Diclofenac Sodium (VOLTAREN) 1 % gel gel     Sig: Apply 4 g topically to the appropriate area as directed 4 (Four) Times a Day As Needed for pain.     Dispense:  100 g     Refill:  0       No orders of the defined types were placed in this encounter.      Return if symptoms worsen or fail to improve.    Brenda LIRA  Malena, DO

## 2024-12-03 RX ORDER — LOSARTAN POTASSIUM AND HYDROCHLOROTHIAZIDE 25; 100 MG/1; MG/1
1 TABLET ORAL DAILY
Qty: 90 TABLET | Refills: 1 | Status: SHIPPED | OUTPATIENT
Start: 2024-12-03

## 2024-12-10 ENCOUNTER — OFFICE VISIT (OUTPATIENT)
Dept: NEUROLOGY | Facility: CLINIC | Age: 59
End: 2024-12-10
Payer: COMMERCIAL

## 2024-12-10 VITALS
SYSTOLIC BLOOD PRESSURE: 130 MMHG | DIASTOLIC BLOOD PRESSURE: 90 MMHG | HEIGHT: 61 IN | HEART RATE: 87 BPM | WEIGHT: 202 LBS | BODY MASS INDEX: 38.14 KG/M2 | TEMPERATURE: 97.3 F | OXYGEN SATURATION: 97 %

## 2024-12-10 DIAGNOSIS — F51.04 CHRONIC INSOMNIA: ICD-10-CM

## 2024-12-10 DIAGNOSIS — G43.719 INTRACTABLE CHRONIC MIGRAINE WITHOUT AURA AND WITHOUT STATUS MIGRAINOSUS: ICD-10-CM

## 2024-12-10 DIAGNOSIS — G47.33 OSA ON CPAP: ICD-10-CM

## 2024-12-10 DIAGNOSIS — G47.19 EXCESSIVE DAYTIME SLEEPINESS: Primary | ICD-10-CM

## 2024-12-10 DIAGNOSIS — G43.909 ACUTE MIGRAINE: ICD-10-CM

## 2024-12-10 PROCEDURE — 99214 OFFICE O/P EST MOD 30 MIN: CPT | Performed by: NURSE PRACTITIONER

## 2024-12-10 PROCEDURE — 1160F RVW MEDS BY RX/DR IN RCRD: CPT | Performed by: NURSE PRACTITIONER

## 2024-12-10 PROCEDURE — 3080F DIAST BP >= 90 MM HG: CPT | Performed by: NURSE PRACTITIONER

## 2024-12-10 PROCEDURE — 1159F MED LIST DOCD IN RCRD: CPT | Performed by: NURSE PRACTITIONER

## 2024-12-10 PROCEDURE — 3075F SYST BP GE 130 - 139MM HG: CPT | Performed by: NURSE PRACTITIONER

## 2024-12-10 RX ORDER — ARMODAFINIL 150 MG/1
150 TABLET ORAL DAILY
Qty: 30 TABLET | Refills: 5 | Status: SHIPPED | OUTPATIENT
Start: 2024-12-10

## 2024-12-10 RX ORDER — AMLODIPINE BESYLATE 2.5 MG/1
2.5 TABLET ORAL DAILY
Qty: 90 TABLET | Refills: 3 | Status: SHIPPED | OUTPATIENT
Start: 2024-12-10

## 2024-12-10 RX ORDER — ARIPIPRAZOLE 2 MG/1
2 TABLET ORAL DAILY
Qty: 90 TABLET | Refills: 1 | Status: SHIPPED | OUTPATIENT
Start: 2024-12-10

## 2024-12-10 NOTE — PROGRESS NOTES
"     Follow up Sleep Patient Office Visit      Patient Name: Robina Dumont  : 1965   MRN: [id number]     Referring Physician: No ref. provider found    Chief Complaint:    Chief Complaint   Patient presents with    Follow-up     1Y F/U    Sleep Apnea     Pt states things are fine.        History of Present Illness: Robina Dumont is a 59 y.o. female who is here today to follow up and was last seen on 10/15/2024.  She is taking Armodafinil 150mg daily and reports good compliance and toleration.  She feels the Armodafinil has helped with her excessive daytime sleepiness- she is more awake and alert during the day.  She takes Ambien 2.5mg QHS PRN sleep, THC/CBD gummies QHS, and Trazodone 200mg QHS- doesn't have to take Ambien very often.  She is taking Emgality 120mg SC monthly and Ubrelvy PRN migraine.  Migraines well controlled right now.  She feels she is doing well and has no complaints or concerns today.   *DME company- [employer]  *Mask- Nasal     Following taking from previous visit note:  Robina Dumont is a 59 y.o. female who is here today to follow up with JIMMY and was last seen on 2024.  Currently on CPAP 13cm, compliance 96%, AHI 1/hour.  She is tolerating her pressure well.  She says she isn't sleeping well and states, \"I have never slept well\".  Wakes up during the night and isn't able to fall back to sleep for 2-3 hours.  She is taking Ambien 2.5mg QHS and says she is trying to not take it every night (at one point she was taking 10mg QHS and it wasn't helping so she stopped the Ambien for at least a year); also taking Trazodone 200mg QHS.  She endorses excessive daytime sleepiness and feels she could fall asleep at any time during the day.  She had trigger point injections and feels they helped with her myofascial pain.  She is taking Emgality 120mg SC monthly and Ubrelvy PRN- reports good compliance and toleration.   *Benedict score 6.   *DME company- [employer]  *Mask- " Nasal    Subjective      Review of Systems:   Review of Systems    Medications:     Current Outpatient Medications:     acetaminophen (TYLENOL) 500 MG tablet, Take 2 tablets by mouth Every 8 (Eight) Hours x1 week, then take as needed., Disp: 42 tablet, Rfl: 0    Advair Diskus 500-50 MCG/ACT DISKUS, Inhale 1 puff by mouth 2 (Two) Times a Day., Disp: 180 each, Rfl: 3    albuterol sulfate  (90 Base) MCG/ACT inhaler, Inhale 2 puffs by mouth Every 6 (Six) Hours As Needed for Wheezing or Shortness of Air., Disp: 18 g, Rfl: 11    armodafinil (NUVIGIL) 150 MG tablet, Take 1 tablet by mouth Daily., Disp: 30 tablet, Rfl: 5    atorvastatin (LIPITOR) 40 MG tablet, Take 1 tablet by mouth Daily., Disp: 90 tablet, Rfl: 1    CBD oil (cannabidiol) capsule, Take  by mouth., Disp: , Rfl:     cetirizine (zyrTEC) 10 MG tablet, Take 1 tablet by mouth Daily., Disp: , Rfl:     cyclobenzaprine (FLEXERIL) 10 MG tablet, Take 1 tablet by mouth 2 (Two) Times a Day As Needed for Muscle Spasms., Disp: 30 tablet, Rfl: 1    cycloSPORINE (Restasis) 0.05 % ophthalmic emulsion, Apply 1 drop to eye(s) as directed by provider Every 12 (Twelve) Hours., Disp: , Rfl:     Diclofenac Sodium (VOLTAREN) 1 % gel gel, Apply 4 g topically to the appropriate area as directed 4 (Four) Times a Day As Needed for pain., Disp: 100 g, Rfl: 0    DULoxetine (CYMBALTA) 60 MG capsule, Take 1 capsule by mouth Daily., Disp: 90 capsule, Rfl: 3    galcanezumab-gnlm (EMGALITY) 120 MG/ML auto-injector pen, Inject 1 mL under the skin into the appropriate area as directed Every 30 (Thirty) Days., Disp: 1 mL, Rfl: 5    Insulin Pen Needle (Pen Needles) 31G X 6 MM misc, 1 application Daily., Disp: 90 each, Rfl: 3    levothyroxine (SYNTHROID, LEVOTHROID) 50 MCG tablet, Take 1 tablet by mouth Daily., Disp: 90 tablet, Rfl: 1    Liraglutide (VICTOZA) 18 MG/3ML solution pen-injector injection, Inject 1.8 mg under the skin into the appropriate area as directed Daily., Disp: 27 mL,  Rfl: 3    losartan-hydrochlorothiazide (HYZAAR) 100-25 MG per tablet, Take 1 tablet by mouth Daily., Disp: 90 tablet, Rfl: 1    Melatonin 10 MG tablet, Take 3 tablets by mouth Every Night., Disp: , Rfl:     montelukast (SINGULAIR) 10 MG tablet, Take 1 tablet by mouth Daily., Disp: 90 tablet, Rfl: 3    ofloxacin (Ocuflox) 0.3 % ophthalmic solution, Administer 1 drop to both eyes 4 (Four) Times a Day., Disp: 5 mL, Rfl: 0    olopatadine (PATANOL) 0.1 % ophthalmic solution, Apply 1 drop to eye(s) as directed by provider Every 12 (Twelve) Hours., Disp: 5 mL, Rfl: 0    omeprazole (priLOSEC) 20 MG capsule, Take 1 capsule by mouth As Needed., Disp: , Rfl:     ondansetron (Zofran) 4 MG tablet, Take 1 tablet by mouth Every 8 (Eight) Hours As Needed for Nausea or Vomiting., Disp: 30 tablet, Rfl: 0    potassium chloride (MICRO-K) 10 MEQ CR capsule, Take 1 capsule by mouth Daily., Disp: 90 capsule, Rfl: 1    traZODone (DESYREL) 100 MG tablet, Take 2 tablets by mouth Every Night., Disp: 180 tablet, Rfl: 1    ubrogepant (Ubrelvy) 100 MG tablet, TAKE 1 TABLET WITH FIRST SYMPTOM OF MIGRAINE. MAY TAKE AGAIN 2 HOURS LATER IF MIGRAINE PERSISTS, Disp: 10 tablet, Rfl: 2    vilazodone (Viibryd) 40 MG tablet tablet, Take 1 tablet by mouth Daily., Disp: 90 tablet, Rfl: 1    zolpidem (AMBIEN) 5 MG tablet, Take 1 tablet by mouth At Night As Needed for Sleep. Indications: Trouble Sleeping, Disp: 30 tablet, Rfl: 2    amLODIPine (NORVASC) 2.5 MG tablet, Take 1 tablet by mouth Daily., Disp: 90 tablet, Rfl: 3    ARIPiprazole (ABILIFY) 2 MG tablet, Take 1 tablet by mouth Daily., Disp: 90 tablet, Rfl: 1    Current Facility-Administered Medications:     Galcanezumab-gnlm solution prefilled syringe 240 mg, 240 mg, Subcutaneous, Q28 Days, Cici Tate, APRN, 240 mg at 06/04/24 1143    Allergies:   Allergies   Allergen Reactions    Erythromycin GI Intolerance and Nausea Only    Sudanyl [Pseudoephedrine] Other (See Comments)     Facial  "swelling     Codeine Itching    Prednisone Other (See Comments)     Hypertension         Objective     Physical Exam:  Vital Signs:   Vitals:    12/10/24 1138   BP: 130/90   BP Location: Right arm   Patient Position: Sitting   Cuff Size: Adult   Pulse: 87   Temp: 97.3 °F (36.3 °C)   TempSrc: Infrared   SpO2: 97%   Weight: 91.6 kg (202 lb)   Height: 154.9 cm (60.98\")   PainSc:   4   PainLoc: Knee     BMI: Body mass index is 38.19 kg/m².    Physical Exam  Vitals and nursing note reviewed.   Constitutional:       General: She is not in acute distress.     Appearance: Normal appearance. She is well-developed. She is obese. She is not diaphoretic.   HENT:      Head: Normocephalic and atraumatic.   Eyes:      Extraocular Movements: Extraocular movements intact.      Conjunctiva/sclera: Conjunctivae normal.   Pulmonary:      Effort: Pulmonary effort is normal. No respiratory distress.   Musculoskeletal:         General: Normal range of motion.   Skin:     General: Skin is dry.   Neurological:      Mental Status: She is alert and oriented to person, place, and time.   Psychiatric:         Mood and Affect: Mood normal.         Behavior: Behavior normal.         Thought Content: Thought content normal.         Judgment: Judgment normal.         Assessment / Plan      Assessment/Plan:   Diagnoses and all orders for this visit:    1. Excessive daytime sleepiness (Primary)  -     armodafinil (NUVIGIL) 150 MG tablet; Take 1 tablet by mouth Daily.  Dispense: 30 tablet; Refill: 5    2. JIMMY on CPAP  -     armodafinil (NUVIGIL) 150 MG tablet; Take 1 tablet by mouth Daily.  Dispense: 30 tablet; Refill: 5    3. Chronic insomnia    4. Intractable chronic migraine without aura and without status migrainosus    5. Acute migraine    *CSA in place and Sumit current.   *Advised patient to avoid driving if drowsy.      Follow Up:   Return in about 6 months (around 6/10/2025) for Follow Up.    *Order for PAP supplies sent to patient's DME " company.     I have advised the patient the need to continue the use of CPAP.  Gold standard for treatment of sleep apnea includes weight loss, use of cpap and avoidance of alcohol.  Encouraged weight loss (if applicable) with a BMI goal of 24.  Untreated JIMMY may increase the risk for development of hypertension, stroke, myocardial infarction, diabetes, cardiovascular disease, work-related issues and driving accidents. I have counseled and advised the patient to avoid driving or operating heavy/dangerous equipment if feeling drowsy.     ZEHRA Elaine, FNP-C  [facility] Neu[facility] and Sleep Medicine

## 2024-12-12 RX ORDER — POTASSIUM CHLORIDE 750 MG/1
10 CAPSULE, EXTENDED RELEASE ORAL DAILY
Qty: 90 CAPSULE | Refills: 1 | Status: SHIPPED | OUTPATIENT
Start: 2024-12-12

## 2024-12-18 ENCOUNTER — PROCEDURE VISIT (OUTPATIENT)
Dept: NEUROLOGY | Facility: CLINIC | Age: 59
End: 2024-12-18
Payer: COMMERCIAL

## 2024-12-18 VITALS
BODY MASS INDEX: 37.76 KG/M2 | HEART RATE: 83 BPM | TEMPERATURE: 96.9 F | HEIGHT: 61 IN | OXYGEN SATURATION: 99 % | DIASTOLIC BLOOD PRESSURE: 86 MMHG | SYSTOLIC BLOOD PRESSURE: 130 MMHG | WEIGHT: 200 LBS

## 2024-12-18 DIAGNOSIS — M79.18 MYOFASCIAL PAIN: Primary | ICD-10-CM

## 2024-12-18 RX ORDER — BUPIVACAINE HYDROCHLORIDE 5 MG/ML
8 INJECTION, SOLUTION PERINEURAL ONCE
Status: COMPLETED | OUTPATIENT
Start: 2024-12-18 | End: 2024-12-18

## 2024-12-18 RX ORDER — METHYLPREDNISOLONE SODIUM SUCCINATE 125 MG/2ML
125 INJECTION INTRAMUSCULAR; INTRAVENOUS ONCE
Status: COMPLETED | OUTPATIENT
Start: 2024-12-18 | End: 2024-12-18

## 2024-12-18 RX ADMIN — BUPIVACAINE HYDROCHLORIDE 8 ML: 5 INJECTION, SOLUTION PERINEURAL at 09:16

## 2024-12-18 RX ADMIN — METHYLPREDNISOLONE SODIUM SUCCINATE 125 MG: 125 INJECTION INTRAMUSCULAR; INTRAVENOUS at 09:16

## 2024-12-18 NOTE — PROGRESS NOTES
Procedure note     Patient Name: Robina Dumont  : 1965   MRN: [id number]     Chief Complaint:    Chief Complaint   Patient presents with    In office procedure      Trigger Inj.        History of Present Illness: Robina Dumont is a 59 y.o. female who is here today for trigger point injections for myofascial pain.  She has had great response to trigger point injections and would like to continue those.     Procedure:    Trigger Point Injections      After risks and benefits were explained including bleeding, infection, worsening of the pain, damage to the area being injected, weakness, allergic reaction to medications, vascular injection, and nerve damage, signed consent was obtained.  All questions were answered.      The area of the trigger point was identified and the skin prepped three times with alcohol and the alcohol allowed to dry.  Next, a 25 gauge 1 inch needle was placed in the area of the trigger point.  Once reproduction of the pain was elicited and negative aspiration confirmed, the trigger point was injected and the needle removed.      The patient tolerated procedure well without immediate complications.     Medication used: 125mg/2 ml of Depo-Medrol; 8 ml of 0.5 % Bupivacaine    Trigger points injected: Cervical paraspinal and trapezius muscles in 10 divided sites.       Subjective     I have reviewed and the following portions of the patient's history were updated as appropriate: past family history, past medical history, past social history, past surgical history and problem list.    Medications:     Current Outpatient Medications:     acetaminophen (TYLENOL) 500 MG tablet, Take 2 tablets by mouth Every 8 (Eight) Hours x1 week, then take as needed., Disp: 42 tablet, Rfl: 0    Advair Diskus 500-50 MCG/ACT DISKUS, Inhale 1 puff by mouth 2 (Two) Times a Day., Disp: 180 each, Rfl: 3    albuterol sulfate  (90 Base) MCG/ACT inhaler, Inhale 2 puffs by mouth Every 6 (Six)  Hours As Needed for Wheezing or Shortness of Air., Disp: 18 g, Rfl: 11    amLODIPine (NORVASC) 2.5 MG tablet, Take 1 tablet by mouth Daily., Disp: 90 tablet, Rfl: 3    ARIPiprazole (ABILIFY) 2 MG tablet, Take 1 tablet by mouth Daily., Disp: 90 tablet, Rfl: 1    armodafinil (NUVIGIL) 150 MG tablet, Take 1 tablet by mouth Daily., Disp: 30 tablet, Rfl: 5    atorvastatin (LIPITOR) 40 MG tablet, Take 1 tablet by mouth Daily., Disp: 90 tablet, Rfl: 1    CBD oil (cannabidiol) capsule, Take  by mouth., Disp: , Rfl:     cetirizine (zyrTEC) 10 MG tablet, Take 1 tablet by mouth Daily., Disp: , Rfl:     cyclobenzaprine (FLEXERIL) 10 MG tablet, Take 1 tablet by mouth 2 (Two) Times a Day As Needed for Muscle Spasms., Disp: 30 tablet, Rfl: 1    cycloSPORINE (Restasis) 0.05 % ophthalmic emulsion, Apply 1 drop to eye(s) as directed by provider Every 12 (Twelve) Hours., Disp: , Rfl:     Diclofenac Sodium (VOLTAREN) 1 % gel gel, Apply 4 g topically to the appropriate area as directed 4 (Four) Times a Day As Needed for pain., Disp: 100 g, Rfl: 0    DULoxetine (CYMBALTA) 60 MG capsule, Take 1 capsule by mouth Daily., Disp: 90 capsule, Rfl: 3    galcanezumab-gnlm (EMGALITY) 120 MG/ML auto-injector pen, Inject 1 mL under the skin into the appropriate area as directed Every 30 (Thirty) Days., Disp: 1 mL, Rfl: 5    Insulin Pen Needle (Pen Needles) 31G X 6 MM misc, 1 application Daily., Disp: 90 each, Rfl: 3    levothyroxine (SYNTHROID, LEVOTHROID) 50 MCG tablet, Take 1 tablet by mouth Daily., Disp: 90 tablet, Rfl: 1    Liraglutide (VICTOZA) 18 MG/3ML solution pen-injector injection, Inject 1.8 mg under the skin into the appropriate area as directed Daily., Disp: 27 mL, Rfl: 3    losartan-hydrochlorothiazide (HYZAAR) 100-25 MG per tablet, Take 1 tablet by mouth Daily., Disp: 90 tablet, Rfl: 1    Melatonin 10 MG tablet, Take 3 tablets by mouth Every Night., Disp: , Rfl:     montelukast (SINGULAIR) 10 MG tablet, Take 1 tablet by mouth Daily.,  "Disp: 90 tablet, Rfl: 3    ofloxacin (Ocuflox) 0.3 % ophthalmic solution, Administer 1 drop to both eyes 4 (Four) Times a Day., Disp: 5 mL, Rfl: 0    olopatadine (PATANOL) 0.1 % ophthalmic solution, Apply 1 drop to eye(s) as directed by provider Every 12 (Twelve) Hours., Disp: 5 mL, Rfl: 0    omeprazole (priLOSEC) 20 MG capsule, Take 1 capsule by mouth As Needed., Disp: , Rfl:     ondansetron (Zofran) 4 MG tablet, Take 1 tablet by mouth Every 8 (Eight) Hours As Needed for Nausea or Vomiting., Disp: 30 tablet, Rfl: 0    potassium chloride (MICRO-K) 10 MEQ CR capsule, Take 1 capsule by mouth Daily., Disp: 90 capsule, Rfl: 1    traZODone (DESYREL) 100 MG tablet, Take 2 tablets by mouth Every Night., Disp: 180 tablet, Rfl: 1    ubrogepant (Ubrelvy) 100 MG tablet, TAKE 1 TABLET WITH FIRST SYMPTOM OF MIGRAINE. MAY TAKE AGAIN 2 HOURS LATER IF MIGRAINE PERSISTS, Disp: 10 tablet, Rfl: 2    vilazodone (Viibryd) 40 MG tablet tablet, Take 1 tablet by mouth Daily., Disp: 90 tablet, Rfl: 1    zolpidem (AMBIEN) 5 MG tablet, Take 1 tablet by mouth At Night As Needed for Sleep. Indications: Trouble Sleeping, Disp: 30 tablet, Rfl: 2    Current Facility-Administered Medications:     Galcanezumab-gnlm solution prefilled syringe 240 mg, 240 mg, Subcutaneous, Q28 Days, Cici Tate APRN, 240 mg at 06/04/24 1143    Allergies:   Allergies   Allergen Reactions    Erythromycin GI Intolerance and Nausea Only    Sudanyl [Pseudoephedrine] Other (See Comments)     Facial swelling     Codeine Itching    Prednisone Other (See Comments)     Hypertension         Objective     Physical Exam:  Vital Signs:   Vitals:    12/18/24 0820   BP: 130/86   BP Location: Right arm   Patient Position: Sitting   Cuff Size: Adult   Pulse: 83   Temp: 96.9 °F (36.1 °C)   SpO2: 99%   Weight: 90.7 kg (200 lb)   Height: 154.9 cm (60.98\")   PainSc:   4   PainLoc: Knee     Body mass index is 37.81 kg/m².    Physical Exam      Assessment / Plan  "     Assessment/Plan:   Diagnoses and all orders for this visit:    1. Myofascial pain (Primary)  -     bupivacaine (MARCAINE) 0.5 % injection 8 mL  -     methylPREDNISolone sodium succinate (SOLU-Medrol) injection 125 mg         Follow Up:   Return in about 3 months (around 3/18/2025) for Trigger point injections.    ZEHRA Elaine, FNP-C  [facility] Neurology and Sleep Medicine

## 2024-12-25 DIAGNOSIS — M62.838 MUSCLE SPASM OF RIGHT SHOULDER: ICD-10-CM

## 2024-12-26 RX ORDER — CYCLOBENZAPRINE HCL 10 MG
10 TABLET ORAL 2 TIMES DAILY PRN
Qty: 30 TABLET | Refills: 1 | Status: SHIPPED | OUTPATIENT
Start: 2024-12-26

## 2025-01-24 ENCOUNTER — OFFICE VISIT (OUTPATIENT)
Dept: INTERNAL MEDICINE | Facility: CLINIC | Age: 60
End: 2025-01-24
Payer: COMMERCIAL

## 2025-01-24 VITALS
SYSTOLIC BLOOD PRESSURE: 130 MMHG | HEIGHT: 61 IN | WEIGHT: 201 LBS | DIASTOLIC BLOOD PRESSURE: 76 MMHG | OXYGEN SATURATION: 97 % | BODY MASS INDEX: 37.95 KG/M2 | TEMPERATURE: 98.4 F | HEART RATE: 93 BPM

## 2025-01-24 DIAGNOSIS — I10 PRIMARY HYPERTENSION: ICD-10-CM

## 2025-01-24 DIAGNOSIS — R19.04 LEFT LOWER QUADRANT ABDOMINAL SWELLING: Primary | ICD-10-CM

## 2025-01-24 DIAGNOSIS — B37.2 CANDIDAL INTERTRIGO: ICD-10-CM

## 2025-01-24 DIAGNOSIS — E11.9 TYPE 2 DIABETES MELLITUS WITHOUT COMPLICATION, WITHOUT LONG-TERM CURRENT USE OF INSULIN: ICD-10-CM

## 2025-01-24 DIAGNOSIS — N90.89 SWELLING OF VULVA: ICD-10-CM

## 2025-01-24 DIAGNOSIS — R19.03 RIGHT LOWER QUADRANT ABDOMINAL SWELLING: ICD-10-CM

## 2025-01-24 PROCEDURE — 1159F MED LIST DOCD IN RCRD: CPT | Performed by: NURSE PRACTITIONER

## 2025-01-24 PROCEDURE — 3075F SYST BP GE 130 - 139MM HG: CPT | Performed by: NURSE PRACTITIONER

## 2025-01-24 PROCEDURE — 1160F RVW MEDS BY RX/DR IN RCRD: CPT | Performed by: NURSE PRACTITIONER

## 2025-01-24 PROCEDURE — 1125F AMNT PAIN NOTED PAIN PRSNT: CPT | Performed by: NURSE PRACTITIONER

## 2025-01-24 PROCEDURE — 99214 OFFICE O/P EST MOD 30 MIN: CPT | Performed by: NURSE PRACTITIONER

## 2025-01-24 PROCEDURE — 3078F DIAST BP <80 MM HG: CPT | Performed by: NURSE PRACTITIONER

## 2025-01-24 RX ORDER — NYSTATIN 100000 U/G
1 CREAM TOPICAL 2 TIMES DAILY PRN
Qty: 30 G | Refills: 1 | Status: SHIPPED | OUTPATIENT
Start: 2025-01-24
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
## 2025-01-28 ENCOUNTER — OFFICE VISIT (OUTPATIENT)
Dept: ORTHOPEDIC SURGERY | Facility: CLINIC | Age: 60
End: 2025-01-28
Payer: COMMERCIAL

## 2025-01-28 VITALS
WEIGHT: 203.6 LBS | HEIGHT: 61 IN | BODY MASS INDEX: 38.44 KG/M2 | DIASTOLIC BLOOD PRESSURE: 84 MMHG | SYSTOLIC BLOOD PRESSURE: 126 MMHG

## 2025-01-28 DIAGNOSIS — E66.09 CLASS 2 OBESITY DUE TO EXCESS CALORIES WITHOUT SERIOUS COMORBIDITY WITH BODY MASS INDEX (BMI) OF 38.0 TO 38.9 IN ADULT: ICD-10-CM

## 2025-01-28 DIAGNOSIS — M25.562 PAIN IN BOTH KNEES, UNSPECIFIED CHRONICITY: ICD-10-CM

## 2025-01-28 DIAGNOSIS — E66.812 CLASS 2 OBESITY DUE TO EXCESS CALORIES WITHOUT SERIOUS COMORBIDITY WITH BODY MASS INDEX (BMI) OF 38.0 TO 38.9 IN ADULT: ICD-10-CM

## 2025-01-28 DIAGNOSIS — M25.561 PAIN IN BOTH KNEES, UNSPECIFIED CHRONICITY: ICD-10-CM

## 2025-01-28 DIAGNOSIS — M17.0 PRIMARY OSTEOARTHRITIS OF BOTH KNEES: Primary | ICD-10-CM

## 2025-01-28 RX ORDER — BUPIVACAINE HYDROCHLORIDE 2.5 MG/ML
3 INJECTION, SOLUTION EPIDURAL; INFILTRATION; INTRACAUDAL
Status: COMPLETED | OUTPATIENT
Start: 2025-01-28 | End: 2025-01-28

## 2025-01-28 RX ORDER — TRIAMCINOLONE ACETONIDE 40 MG/ML
80 INJECTION, SUSPENSION INTRA-ARTICULAR; INTRAMUSCULAR
Status: COMPLETED | OUTPATIENT
Start: 2025-01-28 | End: 2025-01-28

## 2025-01-28 RX ORDER — LIDOCAINE HYDROCHLORIDE 10 MG/ML
3 INJECTION, SOLUTION EPIDURAL; INFILTRATION; INTRACAUDAL; PERINEURAL
Status: COMPLETED | OUTPATIENT
Start: 2025-01-28 | End: 2025-01-28

## 2025-01-28 RX ADMIN — BUPIVACAINE HYDROCHLORIDE 3 ML: 2.5 INJECTION, SOLUTION EPIDURAL; INFILTRATION; INTRACAUDAL at 09:18

## 2025-01-28 RX ADMIN — TRIAMCINOLONE ACETONIDE 80 MG: 40 INJECTION, SUSPENSION INTRA-ARTICULAR; INTRAMUSCULAR at 09:18

## 2025-01-28 RX ADMIN — LIDOCAINE HYDROCHLORIDE 3 ML: 10 INJECTION, SOLUTION EPIDURAL; INFILTRATION; INTRACAUDAL; PERINEURAL at 09:18
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
## 2025-01-28 NOTE — PROGRESS NOTES
Procedure   - Large Joint Arthrocentesis: bilateral knee on 1/28/2025 9:18 AM  Indications: pain  Details: 21 G needle, superolateral approach  Medications (Right): 3 mL lidocaine PF 1% 1 %; 3 mL bupivacaine (PF) 0.25 %; 80 mg triamcinolone acetonide 40 MG/ML  Medications (Left): 3 mL lidocaine PF 1% 1 %; 3 mL bupivacaine (PF) 0.25 %; 80 mg triamcinolone acetonide 40 MG/ML  Outcome: tolerated well, no immediate complications  Procedure, treatment alternatives, risks and benefits explained, specific risks discussed. Consent was given by the patient. Immediately prior to procedure a time out was called to verify the correct patient, procedure, equipment, support staff and site/side marked as required. Patient was prepped and draped in the usual sterile fashion.

## 2025-01-29 ENCOUNTER — HOSPITAL ENCOUNTER (EMERGENCY)
Facility: HOSPITAL | Age: 60
Discharge: HOME | End: 2025-01-29
Attending: STUDENT IN AN ORGANIZED HEALTH CARE EDUCATION/TRAINING PROGRAM | Admitting: STUDENT IN AN ORGANIZED HEALTH CARE EDUCATION/TRAINING PROGRAM
Payer: COMMERCIAL

## 2025-01-29 VITALS
BODY MASS INDEX: 37.76 KG/M2 | HEIGHT: 61 IN | SYSTOLIC BLOOD PRESSURE: 108 MMHG | RESPIRATION RATE: 18 BRPM | HEART RATE: 90 BPM | DIASTOLIC BLOOD PRESSURE: 68 MMHG | OXYGEN SATURATION: 97 % | TEMPERATURE: 99.1 F | WEIGHT: 200 LBS

## 2025-01-29 DIAGNOSIS — G43.809 OTHER MIGRAINE WITHOUT STATUS MIGRAINOSUS, NOT INTRACTABLE: Primary | ICD-10-CM

## 2025-01-29 PROCEDURE — 25010000002 MAGNESIUM SULFATE IN D5W 1G/100ML (PREMIX) 1-5 GM/100ML-% SOLUTION: Performed by: NURSE PRACTITIONER

## 2025-01-29 PROCEDURE — 96365 THER/PROPH/DIAG IV INF INIT: CPT

## 2025-01-29 PROCEDURE — 25010000002 PROCHLORPERAZINE 10 MG/2ML SOLUTION: Performed by: NURSE PRACTITIONER

## 2025-01-29 PROCEDURE — 99282 EMERGENCY DEPT VISIT SF MDM: CPT | Performed by: STUDENT IN AN ORGANIZED HEALTH CARE EDUCATION/TRAINING PROGRAM

## 2025-01-29 PROCEDURE — 96375 TX/PRO/DX INJ NEW DRUG ADDON: CPT

## 2025-01-29 PROCEDURE — 25810000003 SODIUM CHLORIDE 0.9 % SOLUTION: Performed by: NURSE PRACTITIONER

## 2025-01-29 PROCEDURE — 25010000002 DIPHENHYDRAMINE PER 50 MG: Performed by: NURSE PRACTITIONER

## 2025-01-29 RX ORDER — PROCHLORPERAZINE EDISYLATE 5 MG/ML
5 INJECTION INTRAMUSCULAR; INTRAVENOUS ONCE
Status: COMPLETED | OUTPATIENT
Start: 2025-01-29 | End: 2025-01-29

## 2025-01-29 RX ORDER — DIPHENHYDRAMINE HYDROCHLORIDE 50 MG/ML
25 INJECTION INTRAMUSCULAR; INTRAVENOUS ONCE
Status: COMPLETED | OUTPATIENT
Start: 2025-01-29 | End: 2025-01-29

## 2025-01-29 RX ORDER — MAGNESIUM SULFATE 1 G/100ML
1 INJECTION INTRAVENOUS ONCE
Status: COMPLETED | OUTPATIENT
Start: 2025-01-29 | End: 2025-01-29

## 2025-01-29 RX ADMIN — SODIUM CHLORIDE 1000 ML: 9 INJECTION, SOLUTION INTRAVENOUS at 15:56

## 2025-01-29 RX ADMIN — DIPHENHYDRAMINE HYDROCHLORIDE 25 MG: 50 INJECTION, SOLUTION INTRAMUSCULAR; INTRAVENOUS at 15:57

## 2025-01-29 RX ADMIN — MAGNESIUM SULFATE 1 G: 1 INJECTION INTRAVENOUS at 15:59

## 2025-01-29 RX ADMIN — PROCHLORPERAZINE EDISYLATE 5 MG: 5 INJECTION INTRAMUSCULAR; INTRAVENOUS at 15:58
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
## 2025-02-06 ENCOUNTER — OFFICE VISIT (OUTPATIENT)
Dept: INTERNAL MEDICINE | Facility: CLINIC | Age: 60
End: 2025-02-06
Payer: COMMERCIAL

## 2025-02-06 VITALS
OXYGEN SATURATION: 100 % | HEIGHT: 61 IN | RESPIRATION RATE: 16 BRPM | SYSTOLIC BLOOD PRESSURE: 120 MMHG | DIASTOLIC BLOOD PRESSURE: 80 MMHG | HEART RATE: 82 BPM | BODY MASS INDEX: 37.19 KG/M2 | WEIGHT: 197 LBS | TEMPERATURE: 97.8 F

## 2025-02-06 DIAGNOSIS — J01.40 ACUTE NON-RECURRENT PANSINUSITIS: Primary | ICD-10-CM

## 2025-02-06 PROCEDURE — 87428 SARSCOV & INF VIR A&B AG IA: CPT | Performed by: NURSE PRACTITIONER

## 2025-02-06 PROCEDURE — 3074F SYST BP LT 130 MM HG: CPT | Performed by: NURSE PRACTITIONER

## 2025-02-06 PROCEDURE — 87880 STREP A ASSAY W/OPTIC: CPT | Performed by: NURSE PRACTITIONER

## 2025-02-06 PROCEDURE — 99213 OFFICE O/P EST LOW 20 MIN: CPT | Performed by: NURSE PRACTITIONER

## 2025-02-06 PROCEDURE — 1160F RVW MEDS BY RX/DR IN RCRD: CPT | Performed by: NURSE PRACTITIONER

## 2025-02-06 PROCEDURE — 1125F AMNT PAIN NOTED PAIN PRSNT: CPT | Performed by: NURSE PRACTITIONER

## 2025-02-06 PROCEDURE — 3079F DIAST BP 80-89 MM HG: CPT | Performed by: NURSE PRACTITIONER

## 2025-02-06 PROCEDURE — 1159F MED LIST DOCD IN RCRD: CPT | Performed by: NURSE PRACTITIONER

## 2025-02-06 RX ORDER — FLUTICASONE PROPIONATE 50 MCG
2 SPRAY, SUSPENSION (ML) NASAL DAILY
Qty: 16 G | Refills: 0 | Status: SHIPPED | OUTPATIENT
Start: 2025-02-06

## 2025-02-06 NOTE — PROGRESS NOTES
"  Office Visit      Patient Name: Robina Dumont  : 1965   MRN: [id number]   Care Team: Patient Care Team:  Lisandra Mathew APRN as PCP - General (Family Medicine)    Chief Complaint  Pressure Behind the Eyes, Cough (x1week), Sinusitis (Sneezing.), Sore Throat, and Sinus Problem (Sinus drainage.)    Subjective     Subjective      Robina Dumont presents to [facility] for URI symptoms.   Started about 1 week ago.   Endorses significant post nasal drainage, headache, sinus pressure, cough, sore throat, sneezing.   Denies fever, chills, rash, productive sputum, and vomiting.   She has tried OTC cold and flu .   Feels like symptoms are worsening, could not sleep last night due to symptoms.     Objective     Objective   Vital Signs:   /80   Pulse 82   Temp 97.8 °F (36.6 °C)   Resp 16   Ht 154.9 cm (61\")   Wt 89.4 kg (197 lb)   SpO2 100%   BMI 37.22 kg/m²     Physical Exam  Vitals and nursing note reviewed.   Constitutional:       General: She is not in acute distress.     Appearance: Normal appearance. She is not ill-appearing or toxic-appearing.   HENT:      Right Ear: Tympanic membrane and ear canal normal. No middle ear effusion. Tympanic membrane is not bulging.      Left Ear: Tympanic membrane and ear canal normal.  No middle ear effusion. Tympanic membrane is not bulging.      Nose: Nose normal. Congestion present. No rhinorrhea.      Right Sinus: Maxillary sinus tenderness and frontal sinus tenderness present.      Left Sinus: Maxillary sinus tenderness and frontal sinus tenderness present.      Mouth/Throat:      Mouth: Mucous membranes are moist.      Pharynx: Posterior oropharyngeal erythema present.   Eyes:      Pupils: Pupils are equal, round, and reactive to light.   Cardiovascular:      Rate and Rhythm: Normal rate and regular rhythm.      Heart sounds: Normal heart sounds. No murmur heard.  Pulmonary:      Effort: Pulmonary effort is " normal. No respiratory distress.      Breath sounds: Normal breath sounds. No wheezing.   Abdominal:      General: Bowel sounds are normal. There is no distension.      Palpations: Abdomen is soft.      Tenderness: There is no abdominal tenderness.   Musculoskeletal:      Cervical back: Neck supple. No tenderness.   Lymphadenopathy:      Head:      Right side of head: No submental, submandibular or tonsillar adenopathy.      Left side of head: No submental, submandibular or tonsillar adenopathy.      Cervical: No cervical adenopathy.   Skin:     General: Skin is warm and dry.      Findings: No rash.   Neurological:      Mental Status: She is alert.   Psychiatric:         Mood and Affect: Mood normal.         Behavior: Behavior normal.          Assessment / Plan      Assessment & Plan   Problem List Items Addressed This Visit    None  Visit Diagnoses       Acute non-recurrent pansinusitis    -  Primary    Relevant Orders    POCT SARS-CoV-2 Antigen MARIANNA + Flu (Completed)    POCT rapid strep A (Completed)    Testing all negative in office today. Recommend rest , push fluids, due to acute worsening cover for bacterial causes with augmentin BID x 7 days, fluticasone nasal spray, and OTC sinus rinses. Follow-up if not improving.             Follow Up   Return if symptoms worsen or fail to improve.  Patient was given instructions and counseling regarding her condition or for health maintenance advice. Please see specific information pulled into the AVS if appropriate.     ZEHRA Ferrer  [facility]  ]  Answers submitted by the patient for this visit:  Sore Throat Questionnaire (Submitted on 2/5/2025)  Chief Complaint: Sore throat  Chronicity: new  Onset: in the past 7 days  Pain worse on: both  Fever: no fever  Pain - numeric: 3/10  abdominal pain: No  congestion: Yes  cough: Yes  diarrhea: Yes  drainage: Yes  hoarse voice: Yes

## 2025-02-17 DIAGNOSIS — E03.9 ACQUIRED HYPOTHYROIDISM: ICD-10-CM

## 2025-02-17 RX ORDER — ONDANSETRON 4 MG/1
4 TABLET, FILM COATED ORAL EVERY 8 HOURS PRN
Qty: 30 TABLET | Refills: 0 | Status: SHIPPED | OUTPATIENT
Start: 2025-02-17

## 2025-02-17 RX ORDER — ATORVASTATIN CALCIUM 40 MG/1
40 TABLET, FILM COATED ORAL DAILY
Qty: 90 TABLET | Refills: 1 | Status: SHIPPED | OUTPATIENT
Start: 2025-02-17

## 2025-02-17 RX ORDER — LEVOTHYROXINE SODIUM 50 UG/1
50 TABLET ORAL DAILY
Qty: 90 TABLET | Refills: 1 | Status: SHIPPED | OUTPATIENT
Start: 2025-02-17

## 2025-02-17 RX ORDER — VILAZODONE HYDROCHLORIDE 40 MG/1
40 TABLET ORAL DAILY
Qty: 90 TABLET | Refills: 1 | Status: SHIPPED | OUTPATIENT
Start: 2025-02-17

## 2025-02-24 ENCOUNTER — OFFICE VISIT (OUTPATIENT)
Dept: NEUROLOGY | Facility: CLINIC | Age: 60
End: 2025-02-24
Payer: COMMERCIAL

## 2025-02-24 VITALS
WEIGHT: 202 LBS | OXYGEN SATURATION: 98 % | TEMPERATURE: 97.1 F | HEART RATE: 83 BPM | BODY MASS INDEX: 38.14 KG/M2 | DIASTOLIC BLOOD PRESSURE: 84 MMHG | HEIGHT: 61 IN | SYSTOLIC BLOOD PRESSURE: 154 MMHG

## 2025-02-24 DIAGNOSIS — F51.04 CHRONIC INSOMNIA: ICD-10-CM

## 2025-02-24 DIAGNOSIS — G47.19 EXCESSIVE DAYTIME SLEEPINESS: ICD-10-CM

## 2025-02-24 DIAGNOSIS — G43.909 ACUTE MIGRAINE: ICD-10-CM

## 2025-02-24 DIAGNOSIS — G43.719 INTRACTABLE CHRONIC MIGRAINE WITHOUT AURA AND WITHOUT STATUS MIGRAINOSUS: Primary | ICD-10-CM

## 2025-02-24 DIAGNOSIS — G47.33 OSA ON CPAP: ICD-10-CM

## 2025-02-24 PROCEDURE — 99214 OFFICE O/P EST MOD 30 MIN: CPT | Performed by: NURSE PRACTITIONER

## 2025-02-24 PROCEDURE — 1159F MED LIST DOCD IN RCRD: CPT | Performed by: NURSE PRACTITIONER

## 2025-02-24 PROCEDURE — 1160F RVW MEDS BY RX/DR IN RCRD: CPT | Performed by: NURSE PRACTITIONER

## 2025-02-24 PROCEDURE — 3077F SYST BP >= 140 MM HG: CPT | Performed by: NURSE PRACTITIONER

## 2025-02-24 PROCEDURE — 3079F DIAST BP 80-89 MM HG: CPT | Performed by: NURSE PRACTITIONER

## 2025-02-24 NOTE — PROGRESS NOTES
Follow Up Office Visit      Patient Name: Robina Dumont  : 1965   MRN: [id number]     Chief Complaint:    Chief Complaint   Patient presents with    Follow-up     Patient in office to follow up on migraines.          History of Present Illness: Robina Dumont is a 60 y.o. female who is here today to follow up with JIMMY and migraines and was last seen on 12/10/2024.  She is taking Armodafinil 150mg daily, Ambien 2.5mg (rarely takes and prescribed by PCP), THC/CBD gummies QHS, Emgality 120mg monthly, Ubrelvy 100mg PRN, and Trazodone 200mg (prescribed by PCP) QHS- reports good compliance and toleration.  She rarely ever sleeps through the night, no matter what medications she takes, and has always had sleep issues.  She had to go to the ER for a severe migraine on 2025.  She had not had a migraine that bad in years.  She feels overall her migraines are well controlled.  Uses CPAP nightly.      Following taken from previous visit note:  Robina Dumont is a 59 y.o. female who is here today to follow up and was last seen on 10/15/2024.  She is taking Armodafinil 150mg daily and reports good compliance and toleration.  She feels the Armodafinil has helped with her excessive daytime sleepiness- she is more awake and alert during the day.  She takes Ambien 2.5mg QHS PRN sleep, THC/CBD gummies QHS, and Trazodone 200mg QHS- doesn't have to take Ambien very often.  She is taking Emgality 120mg SC monthly and Ubrelvy PRN migraine.  Migraines well controlled right now.  She feels she is doing well and has no complaints or concerns today.   *DME company- [employer]  *Mask- Nasal     Subjective      Review of Systems:   Review of Systems   Neurological:  Positive for headache.   Psychiatric/Behavioral:  Positive for sleep disturbance.        I have reviewed and the following portions of the patient's history were updated as appropriate: past family history, past medical history, past social history,  past surgical history and problem list.    Medications:     Current Outpatient Medications:     acetaminophen (TYLENOL) 500 MG tablet, Take 2 tablets by mouth Every 8 (Eight) Hours x1 week, then take as needed., Disp: 42 tablet, Rfl: 0    Advair Diskus 500-50 MCG/ACT DISKUS, Inhale 1 puff by mouth 2 (Two) Times a Day., Disp: 180 each, Rfl: 3    albuterol sulfate  (90 Base) MCG/ACT inhaler, Inhale 2 puffs by mouth Every 6 (Six) Hours As Needed for Wheezing or Shortness of Air., Disp: 18 g, Rfl: 11    amLODIPine (NORVASC) 2.5 MG tablet, Take 1 tablet by mouth Daily., Disp: 90 tablet, Rfl: 3    ARIPiprazole (ABILIFY) 2 MG tablet, Take 1 tablet by mouth Daily., Disp: 90 tablet, Rfl: 1    armodafinil (NUVIGIL) 150 MG tablet, Take 1 tablet by mouth Daily., Disp: 30 tablet, Rfl: 5    atorvastatin (LIPITOR) 40 MG tablet, Take 1 tablet by mouth Daily., Disp: 90 tablet, Rfl: 1    CBD oil (cannabidiol) capsule, Take  by mouth., Disp: , Rfl:     cetirizine (zyrTEC) 10 MG tablet, Take 1 tablet by mouth Daily., Disp: , Rfl:     cyclobenzaprine (FLEXERIL) 10 MG tablet, Take 1 tablet by mouth 2 (Two) Times a Day As Needed for Muscle Spasms., Disp: 30 tablet, Rfl: 1    cycloSPORINE (Restasis) 0.05 % ophthalmic emulsion, Apply 1 drop to eye(s) as directed by provider Every 12 (Twelve) Hours., Disp: , Rfl:     Diclofenac Sodium (VOLTAREN) 1 % gel gel, Apply 4 g topically to the appropriate area as directed 4 (Four) Times a Day As Needed for pain., Disp: 100 g, Rfl: 0    DULoxetine (CYMBALTA) 60 MG capsule, Take 1 capsule by mouth Daily., Disp: 90 capsule, Rfl: 3    fluticasone (FLONASE) 50 MCG/ACT nasal spray, Administer 2 sprays into the nostril(s) as directed by provider Daily., Disp: 16 g, Rfl: 0    galcanezumab-gnlm (EMGALITY) 120 MG/ML auto-injector pen, Inject 1 mL under the skin into the appropriate area as directed Every 30 (Thirty) Days., Disp: 1 mL, Rfl: 11    Insulin Pen Needle (Pen Needles) 31G X 6 MM misc,   application Daily., Disp: 90 each, Rfl: 3    levothyroxine (SYNTHROID, LEVOTHROID) 50 MCG tablet, Take 1 tablet by mouth Daily., Disp: 90 tablet, Rfl: 1    Liraglutide (VICTOZA) 18 MG/3ML solution pen-injector injection, Inject 1.8 mg under the skin into the appropriate area as directed Daily., Disp: 27 mL, Rfl: 3    losartan-hydrochlorothiazide (HYZAAR) 100-25 MG per tablet, Take 1 tablet by mouth Daily., Disp: 90 tablet, Rfl: 1    Melatonin 10 MG tablet, Take 3 tablets by mouth Every Night., Disp: , Rfl:     montelukast (SINGULAIR) 10 MG tablet, Take 1 tablet by mouth Daily., Disp: 90 tablet, Rfl: 3    nystatin (MYCOSTATIN) 242450 UNIT/GM cream, Apply 1 Application topically to the appropriate area as directed 2 (Two) Times a Day As Needed (intertrigo)., Disp: 30 g, Rfl: 1    ofloxacin (Ocuflox) 0.3 % ophthalmic solution, Administer 1 drop to both eyes 4 (Four) Times a Day., Disp: 5 mL, Rfl: 0    olopatadine (PATANOL) 0.1 % ophthalmic solution, Apply 1 drop to eye(s) as directed by provider Every 12 (Twelve) Hours., Disp: 5 mL, Rfl: 0    omeprazole (priLOSEC) 20 MG capsule, Take 1 capsule by mouth As Needed., Disp: , Rfl:     ondansetron (Zofran) 4 MG tablet, Take 1 tablet by mouth Every 8 (Eight) Hours As Needed for Nausea or Vomiting., Disp: 30 tablet, Rfl: 0    potassium chloride (MICRO-K) 10 MEQ CR capsule, Take 1 capsule by mouth Daily., Disp: 90 capsule, Rfl: 1    traZODone (DESYREL) 100 MG tablet, Take 2 tablets by mouth Every Night., Disp: 180 tablet, Rfl: 1    ubrogepant (Ubrelvy) 100 MG tablet, TAKE 1 TABLET WITH FIRST SYMPTOM OF MIGRAINE. MAY TAKE AGAIN 2 HOURS LATER IF MIGRAINE PERSISTS, Disp: 10 tablet, Rfl: 2    vilazodone (Viibryd) 40 MG tablet tablet, Take 1 tablet by mouth Daily., Disp: 90 tablet, Rfl: 1    zolpidem (AMBIEN) 5 MG tablet, Take 1 tablet by mouth At Night As Needed for Sleep. Indications: Trouble Sleeping, Disp: 30 tablet, Rfl: 2    Current Facility-Administered Medications:      "Galcanezumab-gnlm solution prefilled syringe 240 mg, 240 mg, Subcutaneous, Q28 Days, Cici Ttae APRN, 240 mg at 06/04/24 1143    Allergies:   Allergies   Allergen Reactions    Erythromycin GI Intolerance and Nausea Only    Sudanyl [Pseudoephedrine] Other (See Comments)     Facial swelling     Codeine Itching    Prednisone Other (See Comments)     Hypertension         Objective     Physical Exam:  Vital Signs:   Vitals:    02/24/25 1129   BP: 154/84   BP Location: Right arm   Patient Position: Sitting   Cuff Size: Adult   Pulse: 83   Temp: 97.1 °F (36.2 °C)   SpO2: 98%   Weight: 91.6 kg (202 lb)   Height: 154.9 cm (61\")   PainSc: 0-No pain     Body mass index is 38.17 kg/m².    Physical Exam  Vitals and nursing note reviewed.   Constitutional:       General: She is not in acute distress.     Appearance: Normal appearance. She is well-developed. She is obese. She is not diaphoretic.   HENT:      Head: Normocephalic and atraumatic.   Eyes:      Extraocular Movements: Extraocular movements intact.      Conjunctiva/sclera: Conjunctivae normal.   Pulmonary:      Effort: Pulmonary effort is normal. No respiratory distress.   Skin:     General: Skin is dry.   Neurological:      Mental Status: She is alert and oriented to person, place, and time.   Psychiatric:         Mood and Affect: Mood normal.         Behavior: Behavior normal.         Thought Content: Thought content normal.         Judgment: Judgment normal.         Neurological Exam  Mental Status  Alert. Oriented to person, place, and time.    Cranial Nerves  CN III, IV, VI: Extraocular movements intact bilaterally.      Assessment / Plan      Assessment/Plan:   Diagnoses and all orders for this visit:    1. Intractable chronic migraine without aura and without status migrainosus (Primary)  -     galcanezumab-gnlm (EMGALITY) 120 MG/ML auto-injector pen; Inject 1 mL under the skin into the appropriate area as directed Every 30 (Thirty) Days.  Dispense: " 1 mL; Refill: 11    2. Acute migraine    3. Excessive daytime sleepiness    4. Chronic insomnia    5. JIMMY on CPAP    *CSA in place and Sumit reviewed.   *Will continue current medications and trigger point injections as they seem to be working well.   *Advised patient to avoid driving if drowsy.     Follow Up:   Return in about 6 months (around 8/24/2025) for Follow Up.    ZEHRA Elaine, FNP-C  [facility]

## 2025-02-28 ENCOUNTER — OFFICE VISIT (OUTPATIENT)
Dept: INTERNAL MEDICINE | Facility: CLINIC | Age: 60
End: 2025-02-28
Payer: COMMERCIAL

## 2025-02-28 VITALS
SYSTOLIC BLOOD PRESSURE: 133 MMHG | HEIGHT: 61 IN | BODY MASS INDEX: 37.95 KG/M2 | OXYGEN SATURATION: 100 % | WEIGHT: 201 LBS | HEART RATE: 82 BPM | RESPIRATION RATE: 18 BRPM | TEMPERATURE: 98.2 F | DIASTOLIC BLOOD PRESSURE: 84 MMHG

## 2025-02-28 DIAGNOSIS — K13.1 CHEEK BITING: Primary | ICD-10-CM

## 2025-02-28 DIAGNOSIS — K14.8 TONGUE BITING: ICD-10-CM

## 2025-02-28 PROCEDURE — 3079F DIAST BP 80-89 MM HG: CPT | Performed by: INTERNAL MEDICINE

## 2025-02-28 PROCEDURE — 3075F SYST BP GE 130 - 139MM HG: CPT | Performed by: INTERNAL MEDICINE

## 2025-02-28 PROCEDURE — 1126F AMNT PAIN NOTED NONE PRSNT: CPT | Performed by: INTERNAL MEDICINE

## 2025-02-28 PROCEDURE — 99213 OFFICE O/P EST LOW 20 MIN: CPT | Performed by: INTERNAL MEDICINE

## 2025-02-28 RX ORDER — HYDROCODONE BITARTRATE AND ACETAMINOPHEN 7.5; 325 MG/1; MG/1
TABLET ORAL
COMMUNITY
Start: 2025-02-17

## 2025-02-28 RX ORDER — DIPHENOXYLATE HYDROCHLORIDE AND ATROPINE SULFATE 2.5; .025 MG/1; MG/1
1 TABLET ORAL DAILY
COMMUNITY

## 2025-02-28 RX ORDER — POTASSIUM CHLORIDE 750 MG/1
10 TABLET, EXTENDED RELEASE ORAL DAILY
COMMUNITY
End: 2025-02-28

## 2025-02-28 RX ORDER — MAGNESIUM OXIDE 400 MG/1
400 TABLET ORAL DAILY
COMMUNITY
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
## 2025-03-19 ENCOUNTER — PATIENT MESSAGE (OUTPATIENT)
Dept: INTERNAL MEDICINE | Facility: CLINIC | Age: 60
End: 2025-03-19
Payer: COMMERCIAL

## 2025-03-19 ENCOUNTER — PROCEDURE VISIT (OUTPATIENT)
Dept: NEUROLOGY | Facility: CLINIC | Age: 60
End: 2025-03-19
Payer: COMMERCIAL

## 2025-03-19 VITALS
SYSTOLIC BLOOD PRESSURE: 145 MMHG | TEMPERATURE: 97.1 F | BODY MASS INDEX: 38.33 KG/M2 | OXYGEN SATURATION: 98 % | WEIGHT: 203 LBS | HEIGHT: 61 IN | DIASTOLIC BLOOD PRESSURE: 85 MMHG | HEART RATE: 86 BPM

## 2025-03-19 DIAGNOSIS — K13.6 IRRITATION OF ORAL CAVITY: ICD-10-CM

## 2025-03-19 DIAGNOSIS — M79.18 MYOFASCIAL PAIN: Primary | ICD-10-CM

## 2025-03-19 DIAGNOSIS — Z80.8 FAMILY HISTORY OF CANCER OF TONGUE: ICD-10-CM

## 2025-03-19 DIAGNOSIS — Z80.0 FAMILY HISTORY OF ORAL CANCER: ICD-10-CM

## 2025-03-19 DIAGNOSIS — K14.6 TONGUE PAIN: Primary | ICD-10-CM

## 2025-03-19 RX ORDER — FLUTICASONE PROPIONATE 50 MCG
2 SPRAY, SUSPENSION (ML) NASAL DAILY
Qty: 16 G | Refills: 0 | OUTPATIENT
Start: 2025-03-19

## 2025-03-19 RX ORDER — METHYLPREDNISOLONE SODIUM SUCCINATE 125 MG/2ML
125 INJECTION INTRAMUSCULAR; INTRAVENOUS ONCE
Status: COMPLETED | OUTPATIENT
Start: 2025-03-19 | End: 2025-03-19

## 2025-03-19 RX ORDER — BUPIVACAINE HYDROCHLORIDE 5 MG/ML
8 INJECTION, SOLUTION PERINEURAL ONCE
Status: COMPLETED | OUTPATIENT
Start: 2025-03-19 | End: 2025-03-19

## 2025-03-19 RX ADMIN — BUPIVACAINE HYDROCHLORIDE 8 ML: 5 INJECTION, SOLUTION PERINEURAL at 09:31

## 2025-03-19 RX ADMIN — METHYLPREDNISOLONE SODIUM SUCCINATE 125 MG: 125 INJECTION INTRAMUSCULAR; INTRAVENOUS at 09:32

## 2025-03-19 NOTE — PROGRESS NOTES
Procedure note     Patient Name: Robina Dumont  : 1965   MRN: [id number]     Chief Complaint:    Chief Complaint   Patient presents with    Procedure     Patient in office for trigger injections.         History of Present Illness: Robina Dumont is a 60 y.o. female who is here today for trigger point injections for myofascial pain.  She has had good relief with trigger point injections.     Procedure:    Trigger Point Injections      After risks and benefits were explained including bleeding, infection, worsening of the pain, damage to the area being injected, weakness, allergic reaction to medications, vascular injection, and nerve damage, signed consent was obtained.  All questions were answered.      The area of the trigger point was identified and the skin prepped three times with alcohol and the alcohol allowed to dry.  Next, a 25 gauge 1 inch needle was placed in the area of the trigger point.  Once reproduction of the pain was elicited and negative aspiration confirmed, the trigger point was injected and the needle removed.      The patient tolerated procedure well without immediate complications.     Medication used: 125mg/2 ml of Depo-Medrol; 8 ml of 0.5 % Bupivacaine    Trigger points injected: Cervical paraspinal and trapezius muscles in 10 divided sites.       Subjective     I have reviewed and the following portions of the patient's history were updated as appropriate: past family history, past medical history, past social history, past surgical history and problem list.    Medications:     Current Outpatient Medications:     acetaminophen (TYLENOL) 500 MG tablet, Take 2 tablets by mouth Every 8 (Eight) Hours x1 week, then take as needed., Disp: 42 tablet, Rfl: 0    Advair Diskus 500-50 MCG/ACT DISKUS, Inhale 1 puff by mouth 2 (Two) Times a Day., Disp: 180 each, Rfl: 3    albuterol sulfate  (90 Base) MCG/ACT inhaler, Inhale 2 puffs by mouth Every 6 (Six) Hours As Needed  for Wheezing or Shortness of Air., Disp: 18 g, Rfl: 11    amLODIPine (NORVASC) 2.5 MG tablet, Take 1 tablet by mouth Daily., Disp: 90 tablet, Rfl: 3    ARIPiprazole (ABILIFY) 2 MG tablet, Take 1 tablet by mouth Daily., Disp: 90 tablet, Rfl: 1    armodafinil (NUVIGIL) 150 MG tablet, Take 1 tablet by mouth Daily., Disp: 30 tablet, Rfl: 5    atorvastatin (LIPITOR) 40 MG tablet, Take 1 tablet by mouth Daily., Disp: 90 tablet, Rfl: 1    CBD oil (cannabidiol) capsule, Take  by mouth., Disp: , Rfl:     cetirizine (zyrTEC) 10 MG tablet, Take 1 tablet by mouth Daily., Disp: , Rfl:     cyclobenzaprine (FLEXERIL) 10 MG tablet, Take 1 tablet by mouth 2 (Two) Times a Day As Needed for Muscle Spasms., Disp: 30 tablet, Rfl: 1    cycloSPORINE (Restasis) 0.05 % ophthalmic emulsion, Apply 1 drop to eye(s) as directed by provider Every 12 (Twelve) Hours., Disp: , Rfl:     DULoxetine (CYMBALTA) 60 MG capsule, Take 1 capsule by mouth Daily., Disp: 90 capsule, Rfl: 3    fluticasone (FLONASE) 50 MCG/ACT nasal spray, Administer 2 sprays into the nostril(s) as directed by provider Daily., Disp: 16 g, Rfl: 0    galcanezumab-gnlm (EMGALITY) 120 MG/ML auto-injector pen, Inject 1 mL under the skin into the appropriate area as directed Every 30 (Thirty) Days., Disp: 1 mL, Rfl: 11    Insulin Pen Needle (Pen Needles) 31G X 6 MM misc, 1 application Daily., Disp: 90 each, Rfl: 3    levothyroxine (SYNTHROID, LEVOTHROID) 50 MCG tablet, Take 1 tablet by mouth Daily., Disp: 90 tablet, Rfl: 1    losartan-hydrochlorothiazide (HYZAAR) 100-25 MG per tablet, Take 1 tablet by mouth Daily., Disp: 90 tablet, Rfl: 1    magnesium oxide (MAG-OX) 400 MG tablet, Take 1 tablet by mouth Daily., Disp: , Rfl:     Melatonin 10 MG tablet, Take 3 tablets by mouth Every Night., Disp: , Rfl:     montelukast (SINGULAIR) 10 MG tablet, Take 1 tablet by mouth Daily., Disp: 90 tablet, Rfl: 3    Multi-Vitamin tablet tablet, Take 1 tablet by mouth Daily., Disp: , Rfl:     nystatin  (MYCOSTATIN) 047304 UNIT/GM cream, Apply 1 Application topically to the appropriate area as directed 2 (Two) Times a Day As Needed (intertrigo)., Disp: 30 g, Rfl: 1    ofloxacin (Ocuflox) 0.3 % ophthalmic solution, Administer 1 drop to both eyes 4 (Four) Times a Day., Disp: 5 mL, Rfl: 0    olopatadine (PATANOL) 0.1 % ophthalmic solution, Apply 1 drop to eye(s) as directed by provider Every 12 (Twelve) Hours., Disp: 5 mL, Rfl: 0    omeprazole (priLOSEC) 20 MG capsule, Take 1 capsule by mouth As Needed., Disp: , Rfl:     ondansetron (Zofran) 4 MG tablet, Take 1 tablet by mouth Every 8 (Eight) Hours As Needed for Nausea or Vomiting., Disp: 30 tablet, Rfl: 0    potassium chloride (MICRO-K) 10 MEQ CR capsule, Take 1 capsule by mouth Daily., Disp: 90 capsule, Rfl: 1    traZODone (DESYREL) 100 MG tablet, Take 2 tablets by mouth Every Night., Disp: 180 tablet, Rfl: 1    ubrogepant (Ubrelvy) 100 MG tablet, TAKE 1 TABLET WITH FIRST SYMPTOM OF MIGRAINE. MAY TAKE AGAIN 2 HOURS LATER IF MIGRAINE PERSISTS, Disp: 10 tablet, Rfl: 2    vilazodone (Viibryd) 40 MG tablet tablet, Take 1 tablet by mouth Daily., Disp: 90 tablet, Rfl: 1    zolpidem (AMBIEN) 5 MG tablet, Take 1 tablet by mouth At Night As Needed for Sleep. Indications: Trouble Sleeping, Disp: 30 tablet, Rfl: 2    Current Facility-Administered Medications:     Galcanezumab-gnlm solution prefilled syringe 240 mg, 240 mg, Subcutaneous, Q28 Days, Cici Tate Natty, APRN, 240 mg at 06/04/24 1143    Allergies:   Allergies   Allergen Reactions    Erythromycin GI Intolerance and Nausea Only    Sudanyl [Pseudoephedrine] Other (See Comments)     Facial swelling     Codeine Itching    Nsaids GI Intolerance     GI intolerance    Prednisone Other (See Comments)     Hypertension         Objective     Physical Exam:  Vital Signs:   Vitals:    03/19/25 0914   BP: 145/85   BP Location: Right arm   Patient Position: Sitting   Cuff Size: Adult   Pulse: 86   Temp: 97.1 °F (36.2 °C)  "  SpO2: 98%   Weight: 92.1 kg (203 lb)   Height: 154.9 cm (61\")   PainSc: 0-No pain     Body mass index is 38.36 kg/m².    Physical Exam      Assessment / Plan      Assessment/Plan:   Diagnoses and all orders for this visit:    1. Myofascial pain (Primary)  -     bupivacaine (MARCAINE) 0.5 % injection 8 mL  -     methylPREDNISolone sodium succinate (SOLU-Medrol) injection 125 mg         Follow Up:   Return in about 3 months (around 6/19/2025) for Trigger point injections.    ZEHRA Elaine, FNP-C  [facility]  "

## 2025-04-01 ENCOUNTER — TELEPHONE (OUTPATIENT)
Dept: INTERNAL MEDICINE | Facility: CLINIC | Age: 60
End: 2025-04-01
Payer: COMMERCIAL

## 2025-04-01 NOTE — TELEPHONE ENCOUNTER
"  Caller: Robina Dumont \"SHAKEEL\"    Relationship: Self    Best call back number: [phone]     What is the best time to reach you: ANY    Who are you requesting to speak with (clinical staff, provider,  specific staff member): REFERRALS    Do you know the name of the person who called: PATIENT    What was the call regarding: ENT OFFICE STATES THEY HAVE NOT RECEIVED REFERRAL.  PLEASE FAX -[phone], ATTN VANESA.      Is it okay if the provider responds through [employer]: CALL IF NEEDED  "

## 2025-04-25 ENCOUNTER — TRANSCRIBE ORDERS (OUTPATIENT)
Dept: ADMINISTRATIVE | Facility: HOSPITAL | Age: 60
End: 2025-04-25
Payer: COMMERCIAL

## 2025-04-25 DIAGNOSIS — R13.10 DYSPHAGIA, UNSPECIFIED TYPE: Primary | ICD-10-CM

## 2025-04-29 ENCOUNTER — OFFICE VISIT (OUTPATIENT)
Dept: ORTHOPEDIC SURGERY | Facility: CLINIC | Age: 60
End: 2025-04-29
Payer: COMMERCIAL

## 2025-04-29 VITALS
DIASTOLIC BLOOD PRESSURE: 82 MMHG | BODY MASS INDEX: 39.08 KG/M2 | HEIGHT: 61 IN | WEIGHT: 207 LBS | SYSTOLIC BLOOD PRESSURE: 136 MMHG

## 2025-04-29 DIAGNOSIS — M17.0 PRIMARY OSTEOARTHRITIS OF BOTH KNEES: Primary | ICD-10-CM

## 2025-04-29 DIAGNOSIS — G43.909 ACUTE MIGRAINE: ICD-10-CM

## 2025-04-29 DIAGNOSIS — E66.09 CLASS 2 OBESITY DUE TO EXCESS CALORIES WITHOUT SERIOUS COMORBIDITY WITH BODY MASS INDEX (BMI) OF 39.0 TO 39.9 IN ADULT: ICD-10-CM

## 2025-04-29 DIAGNOSIS — E66.812 CLASS 2 OBESITY DUE TO EXCESS CALORIES WITHOUT SERIOUS COMORBIDITY WITH BODY MASS INDEX (BMI) OF 39.0 TO 39.9 IN ADULT: ICD-10-CM

## 2025-04-29 RX ORDER — BUPIVACAINE HYDROCHLORIDE 2.5 MG/ML
3 INJECTION, SOLUTION EPIDURAL; INFILTRATION; INTRACAUDAL; PERINEURAL
Status: COMPLETED | OUTPATIENT
Start: 2025-04-29 | End: 2025-04-29

## 2025-04-29 RX ORDER — LIDOCAINE HYDROCHLORIDE 10 MG/ML
3 INJECTION, SOLUTION EPIDURAL; INFILTRATION; INTRACAUDAL; PERINEURAL
Status: COMPLETED | OUTPATIENT
Start: 2025-04-29 | End: 2025-04-29

## 2025-04-29 RX ORDER — TRIAMCINOLONE ACETONIDE 40 MG/ML
80 INJECTION, SUSPENSION INTRA-ARTICULAR; INTRAMUSCULAR
Status: COMPLETED | OUTPATIENT
Start: 2025-04-29 | End: 2025-04-29

## 2025-04-29 RX ADMIN — TRIAMCINOLONE ACETONIDE 80 MG: 40 INJECTION, SUSPENSION INTRA-ARTICULAR; INTRAMUSCULAR at 09:14

## 2025-04-29 RX ADMIN — BUPIVACAINE HYDROCHLORIDE 3 ML: 2.5 INJECTION, SOLUTION EPIDURAL; INFILTRATION; INTRACAUDAL; PERINEURAL at 09:14

## 2025-04-29 RX ADMIN — LIDOCAINE HYDROCHLORIDE 3 ML: 10 INJECTION, SOLUTION EPIDURAL; INFILTRATION; INTRACAUDAL; PERINEURAL at 09:14
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
## 2025-04-29 NOTE — PROGRESS NOTES
Procedure   - Large Joint Arthrocentesis: bilateral knee on 4/29/2025 9:14 AM  Indications: pain  Details: 21 G needle, superolateral approach  Medications (Right): 3 mL lidocaine PF 1% 1 %; 80 mg triamcinolone acetonide 40 MG/ML; 3 mL bupivacaine (PF) 0.25 %  Medications (Left): 3 mL lidocaine PF 1% 1 %; 80 mg triamcinolone acetonide 40 MG/ML; 3 mL bupivacaine (PF) 0.25 %  Outcome: tolerated well, no immediate complications  Procedure, treatment alternatives, risks and benefits explained, specific risks discussed. Consent was given by the patient. Immediately prior to procedure a time out was called to verify the correct patient, procedure, equipment, support staff and site/side marked as required. Patient was prepped and draped in the usual sterile fashion.

## 2025-05-05 ENCOUNTER — TELEPHONE (OUTPATIENT)
Dept: NEUROLOGY | Facility: CLINIC | Age: 60
End: 2025-05-05
Payer: COMMERCIAL

## 2025-05-05 ENCOUNTER — PRIOR AUTHORIZATION (OUTPATIENT)
Dept: INTERNAL MEDICINE | Facility: CLINIC | Age: 60
End: 2025-05-05
Payer: COMMERCIAL

## 2025-05-05 NOTE — TELEPHONE ENCOUNTER
"Provider: DREA KNOWLES    Caller: Robina Dumont \"SHAKEEL\"    Relationship to Patient: Self    Phone Number: [phone]    Reason for Call: STATES HER UBRELVY NEEDS A NEW PRIOR AUTH. PLEASE REVIEW & ADVISE, THANK YOU.  "

## 2025-05-13 ENCOUNTER — OFFICE VISIT (OUTPATIENT)
Dept: INTERNAL MEDICINE | Facility: CLINIC | Age: 60
End: 2025-05-13
Payer: COMMERCIAL

## 2025-05-13 VITALS
BODY MASS INDEX: 38.33 KG/M2 | WEIGHT: 203 LBS | DIASTOLIC BLOOD PRESSURE: 84 MMHG | HEIGHT: 61 IN | SYSTOLIC BLOOD PRESSURE: 124 MMHG | HEART RATE: 104 BPM | OXYGEN SATURATION: 98 % | TEMPERATURE: 97.6 F

## 2025-05-13 DIAGNOSIS — J01.40 ACUTE NON-RECURRENT PANSINUSITIS: Primary | ICD-10-CM

## 2025-05-13 DIAGNOSIS — J30.89 ENVIRONMENTAL AND SEASONAL ALLERGIES: ICD-10-CM

## 2025-05-13 PROCEDURE — 3074F SYST BP LT 130 MM HG: CPT | Performed by: FAMILY MEDICINE

## 2025-05-13 PROCEDURE — 99213 OFFICE O/P EST LOW 20 MIN: CPT | Performed by: FAMILY MEDICINE

## 2025-05-13 PROCEDURE — 1159F MED LIST DOCD IN RCRD: CPT | Performed by: FAMILY MEDICINE

## 2025-05-13 PROCEDURE — 3079F DIAST BP 80-89 MM HG: CPT | Performed by: FAMILY MEDICINE

## 2025-05-13 PROCEDURE — 1126F AMNT PAIN NOTED NONE PRSNT: CPT | Performed by: FAMILY MEDICINE

## 2025-05-13 PROCEDURE — 1160F RVW MEDS BY RX/DR IN RCRD: CPT | Performed by: FAMILY MEDICINE

## 2025-05-13 NOTE — PROGRESS NOTES
"Chief Complaint  Sore Throat (Drainage.), Headache (Onset Saturday. ), and Facial Pain    Subjective        Robina Dumont presents to [facility]  History of Present Illness  Few back to area from [place]  Symptoms same as previous sinus infections  Facial pressure, pain  No shortness of breath  Saw allergist many years ago. On Singulair and Zyrtec nightly but also taking Stahist bid.   Sore Throat  Associated symptoms: headaches    Headache    Associated symptoms: headaches and sore throat    Facial Pain  Symptoms include headaches and sore throat.        Objective   Vital Signs:  /84   Pulse 104   Temp 97.6 °F (36.4 °C)   Ht 154.9 cm (60.98\")   Wt 92.1 kg (203 lb)   SpO2 98%   BMI 38.38 kg/m²   Estimated body mass index is 38.38 kg/m² as calculated from the following:    Height as of this encounter: 154.9 cm (60.98\").    Weight as of this encounter: 92.1 kg (203 lb).          Physical Exam  Vitals and nursing note reviewed.   Constitutional:       General: She is not in acute distress.     Appearance: Normal appearance. She is well-developed and well-groomed. She is obese. She is not ill-appearing, toxic-appearing or diaphoretic.      Interventions: Face mask in place.   HENT:      Head: Normocephalic and atraumatic.      Right Ear: Hearing normal.      Left Ear: Hearing normal.   Eyes:      General: Lids are normal. No scleral icterus.     Extraocular Movements: Extraocular movements intact.   Neck:      Trachea: Phonation normal.   Pulmonary:      Effort: Pulmonary effort is normal.      Breath sounds: Normal breath sounds.   Musculoskeletal:      Cervical back: Neck supple.   Skin:     Coloration: Skin is not jaundiced or pale.   Neurological:      General: No focal deficit present.      Mental Status: She is alert and oriented to person, place, and time.      Motor: Motor function is intact.   Psychiatric:         Attention and Perception: Attention and " perception normal.         Mood and Affect: Mood and affect normal.         Speech: Speech normal.         Behavior: Behavior normal. Behavior is cooperative.         Thought Content: Thought content normal.         Cognition and Memory: Cognition and memory normal.         Judgment: Judgment normal.          Result Review :  The following data was reviewed by: Adele Clark MD on 04/16/2025:  BASIC METABOLIC PANEL (02/10/2025 09:21)            Assessment and Plan   Diagnoses and all orders for this visit:    1. Acute non-recurrent pansinusitis (Primary)  -     amoxicillin-clavulanate (AUGMENTIN) 875-125 MG per tablet; Take 1 tablet by mouth Every 12 (Twelve) Hours for 7 days.  Dispense: 14 tablet; Refill: 0    2. Environmental and seasonal allergies  Comments:  if desires consultation [facility] allergist let me or her PCP know, can place referral (may send Punctilt message)                  Follow Up   Return for As Needed.  Patient was given instructions and counseling regarding her condition or for health maintenance advice. Please see specific information pulled into the AVS if appropriate.

## 2025-05-15 ENCOUNTER — OFFICE VISIT (OUTPATIENT)
Dept: ORTHOPEDIC SURGERY | Facility: CLINIC | Age: 60
End: 2025-05-15
Payer: COMMERCIAL

## 2025-05-15 VITALS
SYSTOLIC BLOOD PRESSURE: 118 MMHG | HEIGHT: 61 IN | WEIGHT: 203.04 LBS | DIASTOLIC BLOOD PRESSURE: 72 MMHG | BODY MASS INDEX: 38.34 KG/M2

## 2025-05-15 DIAGNOSIS — M75.41 IMPINGEMENT SYNDROME OF RIGHT SHOULDER: ICD-10-CM

## 2025-05-15 DIAGNOSIS — M75.00 DIABETIC FROZEN SHOULDER ASSOCIATED WITH TYPE 2 DIABETES MELLITUS: Primary | ICD-10-CM

## 2025-05-15 DIAGNOSIS — E11.618 DIABETIC FROZEN SHOULDER ASSOCIATED WITH TYPE 2 DIABETES MELLITUS: Primary | ICD-10-CM

## 2025-05-15 DIAGNOSIS — M25.511 RIGHT SHOULDER PAIN, UNSPECIFIED CHRONICITY: ICD-10-CM

## 2025-05-15 DIAGNOSIS — M75.01 ADHESIVE CAPSULITIS OF RIGHT SHOULDER: ICD-10-CM

## 2025-05-15 DIAGNOSIS — M75.51 BURSITIS OF RIGHT SHOULDER: ICD-10-CM

## 2025-05-15 RX ORDER — TRIAMCINOLONE ACETONIDE 40 MG/ML
40 INJECTION, SUSPENSION INTRA-ARTICULAR; INTRAMUSCULAR
Status: COMPLETED | OUTPATIENT
Start: 2025-05-15 | End: 2025-05-15

## 2025-05-15 RX ORDER — LIDOCAINE HYDROCHLORIDE 10 MG/ML
5 INJECTION, SOLUTION EPIDURAL; INFILTRATION; INTRACAUDAL; PERINEURAL
Status: COMPLETED | OUTPATIENT
Start: 2025-05-15 | End: 2025-05-15

## 2025-05-15 RX ADMIN — TRIAMCINOLONE ACETONIDE 40 MG: 40 INJECTION, SUSPENSION INTRA-ARTICULAR; INTRAMUSCULAR at 09:11

## 2025-05-15 RX ADMIN — LIDOCAINE HYDROCHLORIDE 5 ML: 10 INJECTION, SOLUTION EPIDURAL; INFILTRATION; INTRACAUDAL; PERINEURAL at 09:11

## 2025-05-15 NOTE — PROGRESS NOTES
Procedure   Large Joint Arthrocentesis: R glenohumeral  Date/Time: 5/15/2025 9:11 AM  Consent given by: patient  Site marked: site marked  Timeout: Immediately prior to procedure a time out was called to verify the correct patient, procedure, equipment, support staff and site/side marked as required   Supporting Documentation  Indications: pain   Procedure Details  Location: shoulder - R glenohumeral  Preparation: Patient was prepped and draped in the usual sterile fashion  Needle gauge: 21g.  Approach: posterior  Medications administered: 5 mL lidocaine PF 1% 1 %; 40 mg triamcinolone acetonide 40 MG/ML  Patient tolerance: patient tolerated the procedure well with no immediate complications
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
## 2025-05-16 DIAGNOSIS — M75.01 ADHESIVE CAPSULITIS OF RIGHT SHOULDER: Primary | ICD-10-CM

## 2025-05-16 DIAGNOSIS — M75.00 DIABETIC FROZEN SHOULDER ASSOCIATED WITH TYPE 2 DIABETES MELLITUS: ICD-10-CM

## 2025-05-16 DIAGNOSIS — E11.618 DIABETIC FROZEN SHOULDER ASSOCIATED WITH TYPE 2 DIABETES MELLITUS: ICD-10-CM

## 2025-05-16 DIAGNOSIS — M75.41 IMPINGEMENT SYNDROME OF RIGHT SHOULDER: ICD-10-CM

## 2025-05-16 DIAGNOSIS — M17.11 PRIMARY OSTEOARTHRITIS OF RIGHT KNEE: ICD-10-CM

## 2025-05-18 DIAGNOSIS — F51.04 PSYCHOPHYSIOLOGIC INSOMNIA: ICD-10-CM

## 2025-05-19 RX ORDER — TRAZODONE HYDROCHLORIDE 100 MG/1
200 TABLET ORAL NIGHTLY
Qty: 180 TABLET | Refills: 1 | Status: SHIPPED | OUTPATIENT
Start: 2025-05-19

## 2025-05-25 DIAGNOSIS — F51.04 PSYCHOPHYSIOLOGIC INSOMNIA: ICD-10-CM

## 2025-05-25 LAB
NCCN CRITERIA FLAG: NORMAL
TYRER CUZICK SCORE: 8.5

## 2025-05-27 RX ORDER — LOSARTAN POTASSIUM AND HYDROCHLOROTHIAZIDE 25; 100 MG/1; MG/1
1 TABLET ORAL DAILY
Qty: 90 TABLET | Refills: 1 | Status: SHIPPED | OUTPATIENT
Start: 2025-05-27

## 2025-05-27 RX ORDER — ZOLPIDEM TARTRATE 5 MG/1
5 TABLET ORAL NIGHTLY PRN
Qty: 30 TABLET | Refills: 0 | Status: SHIPPED | OUTPATIENT
Start: 2025-05-27

## 2025-05-27 RX ORDER — VILAZODONE HYDROCHLORIDE 40 MG/1
40 TABLET ORAL DAILY
Qty: 90 TABLET | Refills: 1 | Status: SHIPPED | OUTPATIENT
Start: 2025-05-27

## 2025-05-27 RX ORDER — ARIPIPRAZOLE 2 MG/1
2 TABLET ORAL DAILY
Qty: 90 TABLET | Refills: 1 | Status: SHIPPED | OUTPATIENT
Start: 2025-05-27

## 2025-05-27 NOTE — TELEPHONE ENCOUNTER
Rx Refill Note  Requested Prescriptions     Pending Prescriptions Disp Refills    losartan-hydrochlorothiazide (HYZAAR) 100-25 MG per tablet 90 tablet 1     Sig: Take 1 tablet by mouth Daily.    zolpidem (AMBIEN) 5 MG tablet 30 tablet 2     Sig: Take 1 tablet by mouth At Night As Needed for Sleep. Indications: Trouble Sleeping    ARIPiprazole (ABILIFY) 2 MG tablet 90 tablet 1     Sig: Take 1 tablet by mouth Daily.    vilazodone (Viibryd) 40 MG tablet tablet 90 tablet 1     Sig: Take 1 tablet by mouth Daily.      Last office visit with prescribing clinician: 1/24/2025   Last telemedicine visit with prescribing clinician: Visit date not found   Next office visit with prescribing clinician: Visit date not found                         Would you like a call back once the refill request has been completed: [] Yes [] No    If the office needs to give you a call back, can they leave a voicemail: [] Yes [] No    Sandra Saucedo MA  05/27/25, 08:42 EDT

## 2025-05-28 RX ORDER — POTASSIUM CHLORIDE 750 MG/1
10 CAPSULE, EXTENDED RELEASE ORAL DAILY
Qty: 90 CAPSULE | Refills: 1 | Status: SHIPPED | OUTPATIENT
Start: 2025-05-28

## 2025-05-30 ENCOUNTER — HOSPITAL ENCOUNTER (OUTPATIENT)
Facility: HOSPITAL | Age: 60
Discharge: HOME | End: 2025-05-30
Payer: COMMERCIAL

## 2025-05-30 DIAGNOSIS — R92.8 ABNORMAL MAMMOGRAM: ICD-10-CM

## 2025-05-30 PROCEDURE — 77065 DX MAMMO INCL CAD UNI: CPT

## 2025-06-02 ENCOUNTER — PRIOR AUTHORIZATION (OUTPATIENT)
Dept: INTERNAL MEDICINE | Facility: CLINIC | Age: 60
End: 2025-06-02
Payer: COMMERCIAL

## 2025-06-02 NOTE — TELEPHONE ENCOUNTER
DONTA KHAN (Key: [id number])  PA Case ID #: [id number]      Sent to Plan today    Drug  Ubrelvy 100MG tablets    Form  [employer] [place] Medicaid ePA Form 2017 NCPDP

## 2025-06-03 DIAGNOSIS — G43.909 ACUTE MIGRAINE: ICD-10-CM

## 2025-06-03 NOTE — TELEPHONE ENCOUNTER
Outcome  Approved on June 2 by [place] [employer] Med[employer]act 2017  The request has been approved. The authorization is effective from 06/02/2025 to 06/02/2026, as long as the member is enrolled in their current health plan. A written notification letter will follow with additional details.    Effective Date: 6/2/2025  Authorization Expiration Date: 6/2/2026

## 2025-06-06 ENCOUNTER — HOSPITAL ENCOUNTER (OUTPATIENT)
Dept: GENERAL RADIOLOGY | Facility: HOSPITAL | Age: 60
Discharge: HOME | End: 2025-06-06
Payer: COMMERCIAL

## 2025-06-06 DIAGNOSIS — G47.19 EXCESSIVE DAYTIME SLEEPINESS: ICD-10-CM

## 2025-06-06 DIAGNOSIS — G47.33 OSA ON CPAP: ICD-10-CM

## 2025-06-06 DIAGNOSIS — R13.10 DYSPHAGIA, UNSPECIFIED TYPE: ICD-10-CM

## 2025-06-06 PROCEDURE — 74220 X-RAY XM ESOPHAGUS 1CNTRST: CPT

## 2025-06-06 RX ORDER — ARMODAFINIL 150 MG/1
150 TABLET ORAL DAILY
Qty: 30 TABLET | Refills: 5 | OUTPATIENT
Start: 2025-06-06

## 2025-06-06 NOTE — TELEPHONE ENCOUNTER
"    Caller: Robina Dumont \"SHAKEEL\"    Relationship: Self    Best call back number: [phone]     Requested Prescriptions:   Requested Prescriptions     Pending Prescriptions Disp Refills    armodafinil (NUVIGIL) 150 MG tablet 30 tablet 5     Sig: Take 1 tablet by mouth Daily.        Pharmacy where request should be sent: [facility]     Last office visit with prescribing clinician: 2/24/2025   Last telemedicine visit with prescribing clinician: Visit date not found   Next office visit with prescribing clinician: 6/23/2025     Additional details provided by patient: PATIENT STATED SHE ONLY HAS ENOUGH THROUGH THE WEEKEND    Does the patient have less than a 3 day supply:  [x] Yes  [] No    Would you like a call back once the refill request has been completed: [] Yes [x] No    If the office needs to give you a call back, can they leave a voicemail: [] Yes [x] No    Johnson Costa   06/06/25 10:09 EDT   " No

## 2025-06-09 ENCOUNTER — TREATMENT (OUTPATIENT)
Dept: PHYSICAL THERAPY | Facility: CLINIC | Age: 60
End: 2025-06-09
Payer: COMMERCIAL

## 2025-06-09 DIAGNOSIS — M75.41 IMPINGEMENT SYNDROME OF RIGHT SHOULDER: Primary | ICD-10-CM

## 2025-06-09 PROCEDURE — 97161 PT EVAL LOW COMPLEX 20 MIN: CPT | Performed by: PHYSICAL THERAPIST

## 2025-06-09 RX ORDER — ARMODAFINIL 150 MG/1
150 TABLET ORAL DAILY
Qty: 30 TABLET | Refills: 0 | Status: SHIPPED | OUTPATIENT
Start: 2025-06-09

## 2025-06-09 RX ORDER — POTASSIUM CHLORIDE 750 MG/1
10 CAPSULE, EXTENDED RELEASE ORAL DAILY
Qty: 90 CAPSULE | Refills: 1 | OUTPATIENT
Start: 2025-06-09

## 2025-06-09 NOTE — PROGRESS NOTES
Physical Therapy Initial Evaluation and Plan of Care    TIME IN 10:35  TIME OUT 11:21  Patient: Robina Dumont   : 1965  Diagnosis/ICD-10 Code:  No primary diagnosis found.  Referring practitioner: Diana Chung PA-C    Subjective Evaluation    History of Present Illness  Mechanism of injury: Pt states she had ortho visit and was diagnosed with frozen shoulder. Pt states she has progressively had increased pain and decreased range of motion over the last 2-3 months. Pt does not recall mechanism prior to symptoms beginning. Pt states she had cortisone shot in right shoulder on 5/15 the decreased pain for a few weeks, but states that her pain level now is equal or worse than what it was before. 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 Pt reports she has not had some pain with driving as well, especially during turns, limiting her work as a . Pt reports difficulty with reaching in overhead in cabinets at home, folding clothes, and other household activities.      Patient Occupation:  / Pharmacy Tech Pain  Current pain rating: 3  At best pain rating: 3  At worst pain rating: 10  Quality: sharp, tight and discomfort  Relieving factors: rest and medications (Pt states she takes oxycodone at night but wears off during night and she wakes up in pain)  Aggravating factors: lifting, outstretched reach, movement, repetitive movement and overhead activity  Progression: worsening    Diagnostic Tests  X-ray: normal    Patient Goals  Patient goals for therapy: increased strength, decreased pain, increased motion and independence with ADLs/IADLs           Objective          Tenderness     Right Shoulder  Tenderness in the acromion and biceps tendon (proximal).     Active Range of Motion   Left Shoulder   Flexion: WFL  Abduction: WFL    Right Shoulder   Flexion: 85 degrees   Abduction: 80 degrees     Passive Range of  Motion   Left Shoulder   Flexion: WFL  Abduction: WFL  External rotation 0°: WFL  External rotation 45°: WFL    Right Shoulder   Flexion: 155 degrees with pain  Abduction: 95 degrees with pain  External rotation 45°: 30 degrees with pain  Internal rotation 0°: 25 degrees   Internal rotation 45°: with pain    Additional Passive Range of Motion Details  R shoulder PROM values reaches pain before any capsular end feel can be reached.    Joint Play   Left Shoulder  Joints within functional limits are the anterior capsule, posterior capsule and inferior capsule.     Right Shoulder  Hypomobile in the anterior capsule, posterior capsule and inferior capsule.     Strength/Myotome Testing     Left Shoulder     Planes of Motion   Flexion: 4-   Abduction: 4-   External rotation at 0°: 4+   Internal rotation at 0°: 4+     Right Shoulder     Planes of Motion   Flexion: 2+   Abduction: 2+   External rotation at 0°: 3+   Internal rotation at 0°: 3+     Tests     Right Shoulder   Positive Neer's.           Assessment & Plan       Assessment  Impairments: abnormal or restricted ROM, activity intolerance, impaired physical strength, lacks appropriate home exercise program and pain with function   Functional limitations: carrying objects, lifting, sleeping, uncomfortable because of pain, reaching behind back and reaching overhead   Assessment details: Pt is a 60 year old female who presents with increased right shoulder pain and pain with active range of motion of right shoulder in all directions. Pt demonstrates pain during muscle strength testing with decreased overall strength in right shoulder. Pt has PROM that is WFL in R shoulder, but with pain reported. Pt has positive Neer's test as well as palpation sign and symptoms consistent with a tendinopathy/impingement of right shoulder. Pt will benefit from physical therapy treatment to decrease pain, improve strength and motion of right shoulder, and promote return to previous level  of function.  Prognosis: good    Goals  Plan Goals: Short term goals (3 weeks):  1) Patient will demonstrate adherence to ongoing home exercise program.  2) Patient will report decreased pain in right shoulder at rest  3) Patient will exhibit improved tolerance of PROM/AAROM of R shoulder    Long term goals (6 weeks);  1) Patient will be able to achieve AROM flexion to at least 150 degrees  2) Patient will be able to achieve AROM abduction to at least 120 degrees.  3) Patient will report decreased pain during functional activities in work as  and pharmacy technician.  4) Patient will report improved ability to sleep at night without waking up from pain.    Plan  Therapy options: will be seen for skilled therapy services  Planned modality interventions: thermotherapy (hydrocollator packs) and cryotherapy  Planned therapy interventions: manual therapy, neuromuscular re-education, body mechanics training, postural training, soft tissue mobilization, spinal/joint mobilization, flexibility, functional ROM exercises, strengthening, stretching, home exercise program, joint mobilization and therapeutic activities  Frequency: 2x week  Duration in weeks: 10      Manual Therapy:         mins  37626;  Therapeutic Exercise:         mins  56915;     Neuromuscular Antonio:        mins  65662;    Therapeutic Activity:          mins  36502;     Gait Training:           mins  42056;     Ultrasound:          mins  54424;    Electrical Stimulation:         mins  90949 ( );  Dry Needling          mins self-pay    Timed Treatment:      mins   Total Treatment:     46   mins    PT SIGNATURE: Pranay Mairnelli PT Student   DATE TREATMENT INITIATED: 6/9/2025    Initial Certification  Certification Period: 9/7/2025  I certify that the therapy services are furnished while this patient is under my care.  The services outlined above are required by this patient, and will be reviewed every 90 days.     PHYSICIAN: Diana Chung  RUSS PA-C      DATE:     Please sign and return via fax to [phone].. Thank you, [facility].

## 2025-06-10 NOTE — PROGRESS NOTES
I have reviewed the notes, assessments, and/or procedures performed by Pranay Marinelli, I concur with her/his documentation of Roibna Dumont.

## 2025-06-19 ENCOUNTER — TRANSCRIBE ORDERS (OUTPATIENT)
Dept: ADMINISTRATIVE | Facility: HOSPITAL | Age: 60
End: 2025-06-19
Payer: COMMERCIAL

## 2025-06-19 DIAGNOSIS — J31.2 CHRONIC PHARYNGITIS: ICD-10-CM

## 2025-06-19 DIAGNOSIS — R13.14 DYSPHAGIA, PHARYNGOESOPHAGEAL: ICD-10-CM

## 2025-06-19 DIAGNOSIS — J38.4 EDEMA OF LARYNX: Primary | ICD-10-CM

## 2025-06-19 RX ORDER — OMEPRAZOLE 20 MG/1
20 CAPSULE, DELAYED RELEASE ORAL DAILY
Qty: 90 CAPSULE | Refills: 1 | Status: SHIPPED | OUTPATIENT
Start: 2025-06-19

## 2025-06-23 ENCOUNTER — OFFICE VISIT (OUTPATIENT)
Dept: NEUROLOGY | Facility: CLINIC | Age: 60
End: 2025-06-23
Payer: COMMERCIAL

## 2025-06-23 ENCOUNTER — LAB (OUTPATIENT)
Dept: LAB | Facility: HOSPITAL | Age: 60
End: 2025-06-23
Payer: COMMERCIAL

## 2025-06-23 VITALS
DIASTOLIC BLOOD PRESSURE: 90 MMHG | SYSTOLIC BLOOD PRESSURE: 140 MMHG | WEIGHT: 209.2 LBS | TEMPERATURE: 97.1 F | OXYGEN SATURATION: 97 % | HEIGHT: 61 IN | HEART RATE: 81 BPM | BODY MASS INDEX: 39.5 KG/M2

## 2025-06-23 DIAGNOSIS — E03.9 HYPOTHYROIDISM, UNSPECIFIED TYPE: ICD-10-CM

## 2025-06-23 DIAGNOSIS — G47.19 EXCESSIVE DAYTIME SLEEPINESS: ICD-10-CM

## 2025-06-23 DIAGNOSIS — G43.719 INTRACTABLE CHRONIC MIGRAINE WITHOUT AURA AND WITHOUT STATUS MIGRAINOSUS: ICD-10-CM

## 2025-06-23 DIAGNOSIS — G43.809 OTHER MIGRAINE WITHOUT STATUS MIGRAINOSUS, NOT INTRACTABLE: Primary | ICD-10-CM

## 2025-06-23 DIAGNOSIS — M79.18 MYOFASCIAL PAIN: ICD-10-CM

## 2025-06-23 DIAGNOSIS — G47.33 OSA ON CPAP: Primary | ICD-10-CM

## 2025-06-23 LAB
T-UPTAKE NFR SERPL: 1.17 TBI (ref 0.8–1.3)
T4 SERPL-MCNC: 9.13 MCG/DL (ref 4.5–11.7)
TSH SERPL DL<=0.05 MIU/L-ACNC: 3.01 UIU/ML (ref 0.27–4.2)

## 2025-06-23 PROCEDURE — 99214 OFFICE O/P EST MOD 30 MIN: CPT | Performed by: NURSE PRACTITIONER

## 2025-06-23 PROCEDURE — 36415 COLL VENOUS BLD VENIPUNCTURE: CPT

## 2025-06-23 PROCEDURE — 3080F DIAST BP >= 90 MM HG: CPT | Performed by: NURSE PRACTITIONER

## 2025-06-23 PROCEDURE — 1159F MED LIST DOCD IN RCRD: CPT | Performed by: NURSE PRACTITIONER

## 2025-06-23 PROCEDURE — 84479 ASSAY OF THYROID (T3 OR T4): CPT

## 2025-06-23 PROCEDURE — 84443 ASSAY THYROID STIM HORMONE: CPT

## 2025-06-23 PROCEDURE — 3077F SYST BP >= 140 MM HG: CPT | Performed by: NURSE PRACTITIONER

## 2025-06-23 PROCEDURE — 84436 ASSAY OF TOTAL THYROXINE: CPT

## 2025-06-23 PROCEDURE — 1160F RVW MEDS BY RX/DR IN RCRD: CPT | Performed by: NURSE PRACTITIONER

## 2025-06-23 RX ORDER — ZAVEGEPANT 10 MG/.1ML
10 SPRAY NASAL ONCE
Qty: 1 EACH | Refills: 0 | COMMUNITY
Start: 2025-06-23 | End: 2025-06-23

## 2025-06-23 NOTE — PROGRESS NOTES
Follow Up Office Visit      Patient Name: Robina Dumont  : 1965   MRN: [id number]     Chief Complaint:    Chief Complaint   Patient presents with    Migraine     Pt reports 6-8 MA days. Pt reports 1430 HA days. Pt states she wakes up with HA's, goes to bed with HA's, and is taking tylenol almost daily. Also takes ubrelvy.     Sleep Apnea       History of Present Illness: Robina Dumont is a 60 y.o. female who is here today to follow up with migraines, JIMMY, and myofascial pain and was last seen on 2025.  She has been getting trigger point injections every 3-4 months and that helps with her myofascial pain- now insurance only allows 3 sets of injections yearly and she will not be able to have another set until after 2025.  She is taking Armodafinil 150mg daily, Ambien 5mg (rarely takes and prescribed by PCP), THC/CBD gummies QHS, OTC Tylenol PRN, Emgality 120mg monthly, Ubrelvy 100mg PRN (does help with acute migraines), and Trazodone 200mg (prescribed by PCP) QHS- reports good compliance and toleration.  Feels tired even with the Armodafinil but it does help with her daytime sleepiness.  Emgality was working well but seems to be wearing off.  She has had 30/30 headache days, with at least 15 being migraine days.  Feels most headaches originate from her neck.  Migraines lasting more than 4 hours and accompanied by nausea, sound sensitivity, and light sensitivity.  Currently on CPAP 13cm and her most recent compliance report showed good compliance and an AHI of 2/hour.    *DME- [employer]  *Mask- Nasal   *Medications taken for migraines- Prescribed Atogepant (at previous and it does help with her migraines and she has had 90% improvement in her migraines with this- only 2 migraines since previous visit) visit but insurance will not cover that (appeal denied also); Topiramate; Amitriptyline; Propranolol; Sumatriptan.   *CT cervical spine in  was normal.     Following taken from  previous visit note:  Robina Dumont is a 60 y.o. female who is here today to follow up with JIMMY and migraines and was last seen on 12/10/2024.  She is taking Armodafinil 150mg daily, Ambien 2.5mg (rarely takes and prescribed by PCP), THC/CBD gummies QHS, Emgality 120mg monthly, Ubrelvy 100mg PRN, and Trazodone 200mg (prescribed by PCP) QHS- reports good compliance and toleration.  She rarely ever sleeps through the night, no matter what medications she takes, and has always had sleep issues.  She had to go to the ER for a severe migraine on 1/29/2025.  She had not had a migraine that bad in years.  She feels overall her migraines are well controlled.  Uses CPAP nightly.      Subjective      Review of Systems:   Review of Systems   Constitutional:  Positive for fatigue.   HENT:  Positive for rhinorrhea.    Neurological:  Positive for headache.       I have reviewed and the following portions of the patient's history were updated as appropriate: past family history, past medical history, past social history, past surgical history and problem list.    Medications:     Current Outpatient Medications:     acetaminophen (TYLENOL) 500 MG tablet, Take 2 tablets by mouth Every 8 (Eight) Hours x1 week, then take as needed., Disp: 42 tablet, Rfl: 0    Advair Diskus 500-50 MCG/ACT DISKUS, Inhale 1 puff by mouth 2 (Two) Times a Day., Disp: 180 each, Rfl: 3    albuterol sulfate  (90 Base) MCG/ACT inhaler, Inhale 2 puffs by mouth Every 6 (Six) Hours As Needed for Wheezing or Shortness of Air., Disp: 18 g, Rfl: 11    amLODIPine (NORVASC) 2.5 MG tablet, Take 1 tablet by mouth Daily., Disp: 90 tablet, Rfl: 3    ARIPiprazole (ABILIFY) 2 MG tablet, Take 1 tablet by mouth Daily., Disp: 90 tablet, Rfl: 1    armodafinil (NUVIGIL) 150 MG tablet, Take 1 tablet by mouth Daily., Disp: 30 tablet, Rfl: 0    atorvastatin (LIPITOR) 40 MG tablet, Take 1 tablet by mouth Daily., Disp: 90 tablet, Rfl: 1    CBD oil (cannabidiol) capsule,  Take  by mouth., Disp: , Rfl:     cetirizine (zyrTEC) 10 MG tablet, Take 1 tablet by mouth Daily., Disp: , Rfl:     cyclobenzaprine (FLEXERIL) 10 MG tablet, Take 1 tablet by mouth 2 (Two) Times a Day As Needed for Muscle Spasms., Disp: 30 tablet, Rfl: 1    cycloSPORINE (Restasis) 0.05 % ophthalmic emulsion, Apply 1 drop to eye(s) as directed by provider Every 12 (Twelve) Hours., Disp: , Rfl:     DULoxetine (CYMBALTA) 60 MG capsule, Take 1 capsule by mouth Daily., Disp: 90 capsule, Rfl: 3    fluticasone (FLONASE) 50 MCG/ACT nasal spray, Administer 2 sprays into the nostril(s) as directed by provider Daily., Disp: 16 g, Rfl: 0    galcanezumab-gnlm (EMGALITY) 120 MG/ML auto-injector pen, Inject 1 mL under the skin into the appropriate area as directed Every 30 (Thirty) Days., Disp: 1 mL, Rfl: 11    Insulin Pen Needle (Pen Needles) 31G X 6 MM misc, 1 application Daily., Disp: 90 each, Rfl: 3    levothyroxine (SYNTHROID, LEVOTHROID) 50 MCG tablet, Take 1 tablet by mouth Daily., Disp: 90 tablet, Rfl: 1    losartan-hydrochlorothiazide (HYZAAR) 100-25 MG per tablet, Take 1 tablet by mouth Daily., Disp: 90 tablet, Rfl: 1    magnesium oxide (MAG-OX) 400 MG tablet, Take 1 tablet by mouth Daily., Disp: , Rfl:     Melatonin 10 MG tablet, Take 3 tablets by mouth Every Night., Disp: , Rfl:     montelukast (SINGULAIR) 10 MG tablet, Take 1 tablet by mouth Daily., Disp: 90 tablet, Rfl: 3    Multi-Vitamin tablet tablet, Take 1 tablet by mouth Daily., Disp: , Rfl:     nystatin (MYCOSTATIN) 187435 UNIT/GM powder, Apply  topically to the appropriate area as directed 2 (Two) Times a Day., Disp: 120 g, Rfl: 6    ofloxacin (Ocuflox) 0.3 % ophthalmic solution, Administer 1 drop to both eyes 4 (Four) Times a Day., Disp: 5 mL, Rfl: 0    olopatadine (PATANOL) 0.1 % ophthalmic solution, Apply 1 drop to eye(s) as directed by provider Every 12 (Twelve) Hours., Disp: 5 mL, Rfl: 0    omeprazole (priLOSEC) 20 MG capsule, Take 1 capsule by mouth  "Daily., Disp: 90 capsule, Rfl: 1    ondansetron (Zofran) 4 MG tablet, Take 1 tablet by mouth Every 8 (Eight) Hours As Needed for Nausea or Vomiting., Disp: 30 tablet, Rfl: 0    potassium chloride (MICRO-K) 10 MEQ CR capsule, Take 1 capsule by mouth Daily., Disp: 90 capsule, Rfl: 1    traZODone (DESYREL) 100 MG tablet, Take 2 tablets by mouth Every Night., Disp: 180 tablet, Rfl: 1    ubrogepant (Ubrelvy) 100 MG tablet, TAKE 1 TABLET BY MOUTH WITH FIRST SYMPTOM OF MIGRAINE. MAY TAKE AGAIN 2 HOURS LATER IF MIGRAINE PERSISTS, Disp: 10 tablet, Rfl: 5    vilazodone (Viibryd) 40 MG tablet tablet, Take 1 tablet by mouth Daily., Disp: 90 tablet, Rfl: 1    zolpidem (AMBIEN) 5 MG tablet, Take 1 tablet by mouth At Night As Needed for Sleep. Indications: Trouble Sleeping, Disp: 30 tablet, Rfl: 0    Zavegepant HCl (Zavzpret) 10 MG/ACT solution, Administer 10 mg into the nostril(s) as directed by provider 1 time for 1 dose., Disp: 1 each, Rfl: 0    Current Facility-Administered Medications:     Galcanezumab-gnlm solution prefilled syringe 240 mg, 240 mg, Subcutaneous, Q28 Days, Cici Tate APRN, 240 mg at 06/04/24 1143    Allergies:   Allergies   Allergen Reactions    Erythromycin GI Intolerance and Nausea Only    Sudanyl [Pseudoephedrine] Other (See Comments)     Facial swelling     Codeine Itching    Nsaids GI Intolerance     GI intolerance    Prednisone Other (See Comments)     Hypertension         Objective     Physical Exam:  Vital Signs:   Vitals:    06/23/25 0915   BP: 140/90   Pulse: 81   Temp: 97.1 °F (36.2 °C)   SpO2: 97%   Weight: 94.9 kg (209 lb 3.2 oz)   Height: 154.9 cm (60.98\")   PainSc: 3    PainLoc: Head     Body mass index is 39.55 kg/m².    Physical Exam  Vitals and nursing note reviewed.   Constitutional:       General: She is not in acute distress.     Appearance: Normal appearance. She is well-developed. She is obese. She is not diaphoretic.   HENT:      Head: Normocephalic and atraumatic. "   Eyes:      Extraocular Movements: Extraocular movements intact.      Conjunctiva/sclera: Conjunctivae normal.   Pulmonary:      Effort: Pulmonary effort is normal. No respiratory distress.   Musculoskeletal:         General: Normal range of motion.   Skin:     General: Skin is dry.   Neurological:      Mental Status: She is alert and oriented to person, place, and time.   Psychiatric:         Mood and Affect: Mood normal.         Behavior: Behavior normal.         Thought Content: Thought content normal.         Judgment: Judgment normal.         Neurological Exam  Mental Status  Alert. Oriented to person, place, and time.    Cranial Nerves  CN III, IV, VI: Extraocular movements intact bilaterally.        Assessment / Plan      Assessment/Plan:   Diagnoses and all orders for this visit:    1. JIMMY on CPAP (Primary)    2. Excessive daytime sleepiness    3. Intractable chronic migraine without aura and without status migrainosus  -     Ambulatory Referral to Disease State Management    4. Myofascial pain    5. Hypothyroidism, unspecified type  -     Thyroid Panel With TSH; Future    *Referral to [facility] for Botox injections. Indications and possible SEs of Botox discussed with patient. Will do sample Botox tomorrow at 15:45.   *I have advised patient to cut back on her daily Tylenol intake.   *Thyroid panel ordered for fatigue and hypothyroidism.   *I have advised patient to not take the Armodafinil for a few days and see if her daily headaches improve.   *Patient has been advised to avoid driving if drowsy.        Follow Up:   No follow-ups on file.    ZEHRA Elaine, FNP-C  [facility]

## 2025-06-24 ENCOUNTER — PROCEDURE VISIT (OUTPATIENT)
Dept: NEUROLOGY | Facility: CLINIC | Age: 60
End: 2025-06-24
Payer: COMMERCIAL

## 2025-06-24 VITALS
WEIGHT: 209 LBS | DIASTOLIC BLOOD PRESSURE: 87 MMHG | BODY MASS INDEX: 39.46 KG/M2 | HEART RATE: 96 BPM | OXYGEN SATURATION: 96 % | HEIGHT: 61 IN | SYSTOLIC BLOOD PRESSURE: 154 MMHG

## 2025-06-24 DIAGNOSIS — G43.719 INTRACTABLE CHRONIC MIGRAINE WITHOUT AURA AND WITHOUT STATUS MIGRAINOSUS: Primary | ICD-10-CM

## 2025-06-24 PROCEDURE — 64615 CHEMODENERV MUSC MIGRAINE: CPT | Performed by: NURSE PRACTITIONER

## 2025-06-24 NOTE — PROGRESS NOTES
Botox Procedure Note       Patient Name: Robina Dumont  : 1965   MRN: [id number]     Chief Complaint:    Chief Complaint   Patient presents with    Procedure     PATIENT IN OFFICE FOR BOTOX INJECTIONS. SAMPLE NDC# 0023.3921.02         History of Present Illness: Robina Dumont is a 60 y.o. female who is here today for Botox injections for migraines.  This will be her first set of Botox injections for chronic migraines.     We have discussed risk and benefits of this Botox procedure and common side effects including headache, neck pain, neck stiffness or weakness, ptosis, flu-like symptoms as well as more serious possible adverse effects including possible dysphagia, respiratory distress or even death (death has only been reported once with adults for Botox for migraines in another state when mixed with lidocaine solution which we do not use lidocaine solution in our practice for mixing Botox). The patient verbally understands and accepts risks and agrees with moving forward with Botox injections for chronic migraine prevention.    Verbal and written consent has been obtained from the patient prior to beginning treatment injections.     Subjective      Review of Systems:   Review of Systems    The following portions of the patient's history were reviewed an updated as appropriate: past family history, past medical history, past social history, past surgical history.     Medications:     Current Outpatient Medications:     acetaminophen (TYLENOL) 500 MG tablet, Take 2 tablets by mouth Every 8 (Eight) Hours x1 week, then take as needed., Disp: 42 tablet, Rfl: 0    Advair Diskus 500-50 MCG/ACT DISKUS, Inhale 1 puff by mouth 2 (Two) Times a Day., Disp: 180 each, Rfl: 3    albuterol sulfate  (90 Base) MCG/ACT inhaler, Inhale 2 puffs by mouth Every 6 (Six) Hours As Needed for Wheezing or Shortness of Air., Disp: 18 g, Rfl: 11    amLODIPine (NORVASC) 2.5 MG tablet, Take 1 tablet by mouth  Daily., Disp: 90 tablet, Rfl: 3    ARIPiprazole (ABILIFY) 2 MG tablet, Take 1 tablet by mouth Daily., Disp: 90 tablet, Rfl: 1    armodafinil (NUVIGIL) 150 MG tablet, Take 1 tablet by mouth Daily., Disp: 30 tablet, Rfl: 0    atorvastatin (LIPITOR) 40 MG tablet, Take 1 tablet by mouth Daily., Disp: 90 tablet, Rfl: 1    CBD oil (cannabidiol) capsule, Take  by mouth., Disp: , Rfl:     cetirizine (zyrTEC) 10 MG tablet, Take 1 tablet by mouth Daily., Disp: , Rfl:     cyclobenzaprine (FLEXERIL) 10 MG tablet, Take 1 tablet by mouth 2 (Two) Times a Day As Needed for Muscle Spasms., Disp: 30 tablet, Rfl: 1    cycloSPORINE (Restasis) 0.05 % ophthalmic emulsion, Apply 1 drop to eye(s) as directed by provider Every 12 (Twelve) Hours., Disp: , Rfl:     DULoxetine (CYMBALTA) 60 MG capsule, Take 1 capsule by mouth Daily., Disp: 90 capsule, Rfl: 3    fluticasone (FLONASE) 50 MCG/ACT nasal spray, Administer 2 sprays into the nostril(s) as directed by provider Daily., Disp: 16 g, Rfl: 0    galcanezumab-gnlm (EMGALITY) 120 MG/ML auto-injector pen, Inject 1 mL under the skin into the appropriate area as directed Every 30 (Thirty) Days., Disp: 1 mL, Rfl: 11    Insulin Pen Needle (Pen Needles) 31G X 6 MM misc, 1 application Daily., Disp: 90 each, Rfl: 3    levothyroxine (SYNTHROID, LEVOTHROID) 50 MCG tablet, Take 1 tablet by mouth Daily., Disp: 90 tablet, Rfl: 1    losartan-hydrochlorothiazide (HYZAAR) 100-25 MG per tablet, Take 1 tablet by mouth Daily., Disp: 90 tablet, Rfl: 1    magnesium oxide (MAG-OX) 400 MG tablet, Take 1 tablet by mouth Daily., Disp: , Rfl:     Melatonin 10 MG tablet, Take 3 tablets by mouth Every Night., Disp: , Rfl:     montelukast (SINGULAIR) 10 MG tablet, Take 1 tablet by mouth Daily., Disp: 90 tablet, Rfl: 3    Multi-Vitamin tablet tablet, Take 1 tablet by mouth Daily., Disp: , Rfl:     nystatin (MYCOSTATIN) 954339 UNIT/GM powder, Apply  topically to the appropriate area as directed 2 (Two) Times a Day., Disp:  "120 g, Rfl: 6    ofloxacin (Ocuflox) 0.3 % ophthalmic solution, Administer 1 drop to both eyes 4 (Four) Times a Day., Disp: 5 mL, Rfl: 0    olopatadine (PATANOL) 0.1 % ophthalmic solution, Apply 1 drop to eye(s) as directed by provider Every 12 (Twelve) Hours., Disp: 5 mL, Rfl: 0    omeprazole (priLOSEC) 20 MG capsule, Take 1 capsule by mouth Daily., Disp: 90 capsule, Rfl: 1    ondansetron (Zofran) 4 MG tablet, Take 1 tablet by mouth Every 8 (Eight) Hours As Needed for Nausea or Vomiting., Disp: 30 tablet, Rfl: 0    potassium chloride (MICRO-K) 10 MEQ CR capsule, Take 1 capsule by mouth Daily., Disp: 90 capsule, Rfl: 1    traZODone (DESYREL) 100 MG tablet, Take 2 tablets by mouth Every Night., Disp: 180 tablet, Rfl: 1    ubrogepant (Ubrelvy) 100 MG tablet, TAKE 1 TABLET BY MOUTH WITH FIRST SYMPTOM OF MIGRAINE. MAY TAKE AGAIN 2 HOURS LATER IF MIGRAINE PERSISTS, Disp: 10 tablet, Rfl: 5    vilazodone (Viibryd) 40 MG tablet tablet, Take 1 tablet by mouth Daily., Disp: 90 tablet, Rfl: 1    zolpidem (AMBIEN) 5 MG tablet, Take 1 tablet by mouth At Night As Needed for Sleep. Indications: Trouble Sleeping, Disp: 30 tablet, Rfl: 0    Current Facility-Administered Medications:     Galcanezumab-gnlm solution prefilled syringe 240 mg, 240 mg, Subcutaneous, Q28 Days, Cici Tate APRN, 240 mg at 06/04/24 1143    Allergies:   Allergies   Allergen Reactions    Erythromycin GI Intolerance and Nausea Only    Sudanyl [Pseudoephedrine] Other (See Comments)     Facial swelling     Codeine Itching    Nsaids GI Intolerance     GI intolerance    Prednisone Other (See Comments)     Hypertension         Objective     Physical Exam:  Vital Signs:   Vitals:    06/24/25 1533   BP: 154/87   BP Location: Left arm   Patient Position: Sitting   Cuff Size: Adult   Pulse: 96   SpO2: 96%   Weight: 94.8 kg (209 lb)  Comment: PATIENT REPORTED.   Height: 154.9 cm (60.98\")   PainSc: 0-No pain     Body mass index is 39.52 kg/m².     Physical " Exam      BOTOX PROCEDURE:     Date of Last Injection: n/a  Response: n/a  Side Effects: n/a  : Fair and Squaresatish  Lot #: [id number]  Expiration Date: 12/2026  NDC: 6890073047    200 unit vial Botox was re-constituted with 4 mL 0.9 NS to 5 unit/0.1 mL using standard techniques.  10 units were given at the  muscle in 2 divided sites  5 units were given at the Procerus muscle  20 units were given at the Frontalis muscle in 4 divided sites  40 units were given at the Temporalis muscle in 8 divided sites  30 units were given at the Occipitalis muscle in 6 divided sites  20 units were given at the Cervical paraspinal muscle in 4 divided sites  30 units were given at the Trapezius muscle in 6 divided sites  For a total of 155 units divided between 31 sites and 45 units of wastage    Patient tolerated procedure well with no immediate complications.     Assessment / Plan      Assessment/Plan:   Diagnoses and all orders for this visit:    1. Intractable chronic migraine without aura and without status migrainosus (Primary)  -     OnabotulinumtoxinA 200 Units       Follow Up:   It has been determined that Robina Dumont has chronic migraine headaches. We will proceed with a 12 week regimen of 200 units of Botox injections, to treat the patient's chronic migraines.      No follow-ups on file.      ZEHRA Elaine, FNP-C  [facility]

## 2025-06-26 ENCOUNTER — TREATMENT (OUTPATIENT)
Dept: PHYSICAL THERAPY | Facility: CLINIC | Age: 60
End: 2025-06-26
Payer: COMMERCIAL

## 2025-06-26 DIAGNOSIS — M75.41 IMPINGEMENT SYNDROME OF RIGHT SHOULDER: Primary | ICD-10-CM

## 2025-06-26 PROCEDURE — 97140 MANUAL THERAPY 1/> REGIONS: CPT | Performed by: PHYSICAL THERAPIST

## 2025-06-26 PROCEDURE — 97110 THERAPEUTIC EXERCISES: CPT | Performed by: PHYSICAL THERAPIST

## 2025-06-26 PROCEDURE — 97112 NEUROMUSCULAR REEDUCATION: CPT | Performed by: PHYSICAL THERAPIST

## 2025-06-26 NOTE — PROGRESS NOTES
Physical Therapy Daily Treatment Note      Visit #: 2    Robina Dumont reports 0/10 pain today at rest.  Pt reports that the shoulder pain has not been as bad since beginning PT. Pt reports that she still has pain when moving the shoulder and using the shoulder. Pt reports that sleeping is still very uncomfortable.         Objective Pt present to PT today with no distress at rest.     Pt with discomfort and pinching in the R shoulder with OP with stretching today.     Pt with discomfort in the shoulder with strengthening activities today.     Pt educated on sleeping position for better comfort during night.       See Exercise, Manual, and Modality Logs for complete treatment.     Assessment/Plan  Pt continues to have limited R shoulder function and pain. Pt to follow up next week and progress as tolerated after assessing reaction to today's treatment.       Progress per Plan of Care      Visit Diagnosis:    ICD-10-CM ICD-9-CM   1. Impingement syndrome of right shoulder  M75.41 726.2              Timed Treatment:  Manual Therapy:    16     mins  77484;  Therapeutic Exercise:    12     mins  73285;     Neuromuscular Antonio:    10    mins  35932;    Therapeutic Activity:          mins  32330;     Gait Training:           mins  61792;     Ultrasound:          mins  49855;    Electrical Stimulation:         mins  77795 ( );  Dry Needling          mins self-pay  Iontophoresis          mins 24586    Untimed Treatments:  Electrical Stimulation:         mins  10931 (MC );  Dry Needling:                     mins  Ultrasound:                         mins  61009;        Timed Treatment:   38   mins   Total Treatment:     45   mins    Juaquin Montero, PT  Physical Therapist

## 2025-06-28 ENCOUNTER — OFFICE VISIT (OUTPATIENT)
Dept: INTERNAL MEDICINE | Facility: CLINIC | Age: 60
End: 2025-06-28
Payer: COMMERCIAL

## 2025-06-28 VITALS
HEART RATE: 78 BPM | BODY MASS INDEX: 38.97 KG/M2 | WEIGHT: 206.4 LBS | DIASTOLIC BLOOD PRESSURE: 80 MMHG | RESPIRATION RATE: 20 BRPM | OXYGEN SATURATION: 97 % | HEIGHT: 61 IN | TEMPERATURE: 97.2 F | SYSTOLIC BLOOD PRESSURE: 118 MMHG

## 2025-06-28 DIAGNOSIS — R15.9 INCONTINENCE OF FECES, UNSPECIFIED FECAL INCONTINENCE TYPE: ICD-10-CM

## 2025-06-28 DIAGNOSIS — E03.9 ACQUIRED HYPOTHYROIDISM: ICD-10-CM

## 2025-06-28 DIAGNOSIS — N39.46 MIXED STRESS AND URGE URINARY INCONTINENCE: ICD-10-CM

## 2025-06-28 DIAGNOSIS — K14.6 TONGUE PAIN: ICD-10-CM

## 2025-06-28 DIAGNOSIS — K13.6 IRRITATION OF ORAL CAVITY: ICD-10-CM

## 2025-06-28 DIAGNOSIS — E11.9 TYPE 2 DIABETES MELLITUS WITHOUT COMPLICATION, WITHOUT LONG-TERM CURRENT USE OF INSULIN: Primary | ICD-10-CM

## 2025-06-28 LAB
EXPIRATION DATE: NORMAL
Lab: NORMAL
POC ALBUMIN, URINE: 10 MG/L
POC CREATININE, URINE: 50 MG/DL
POC URINE ALB/CREA RATIO: <30

## 2025-06-28 PROCEDURE — 1159F MED LIST DOCD IN RCRD: CPT | Performed by: NURSE PRACTITIONER

## 2025-06-28 PROCEDURE — 1160F RVW MEDS BY RX/DR IN RCRD: CPT | Performed by: NURSE PRACTITIONER

## 2025-06-28 PROCEDURE — 3074F SYST BP LT 130 MM HG: CPT | Performed by: NURSE PRACTITIONER

## 2025-06-28 PROCEDURE — 3079F DIAST BP 80-89 MM HG: CPT | Performed by: NURSE PRACTITIONER

## 2025-06-28 PROCEDURE — 1126F AMNT PAIN NOTED NONE PRSNT: CPT | Performed by: NURSE PRACTITIONER

## 2025-06-28 PROCEDURE — 82044 UR ALBUMIN SEMIQUANTITATIVE: CPT | Performed by: NURSE PRACTITIONER

## 2025-06-28 PROCEDURE — 82570 ASSAY OF URINE CREATININE: CPT | Performed by: NURSE PRACTITIONER

## 2025-06-28 PROCEDURE — 99214 OFFICE O/P EST MOD 30 MIN: CPT | Performed by: NURSE PRACTITIONER

## 2025-06-28 RX ORDER — LEVOTHYROXINE SODIUM 75 UG/1
75 TABLET ORAL DAILY
Qty: 90 TABLET | Refills: 1 | Status: SHIPPED | OUTPATIENT
Start: 2025-06-28

## 2025-06-28 RX ORDER — METHYLPREDNISOLONE ACETATE 80 MG/ML
80 INJECTION, SUSPENSION INTRA-ARTICULAR; INTRALESIONAL; INTRAMUSCULAR; SOFT TISSUE ONCE
Status: SHIPPED | OUTPATIENT
Start: 2025-06-28
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
## 2025-06-30 RX ORDER — LIRAGLUTIDE 6 MG/ML
INJECTION SUBCUTANEOUS
Qty: 25 ML | Refills: 0 | Status: SHIPPED | OUTPATIENT
Start: 2025-06-30 | End: 2025-09-26

## 2025-07-02 ENCOUNTER — TREATMENT (OUTPATIENT)
Dept: PHYSICAL THERAPY | Facility: CLINIC | Age: 60
End: 2025-07-02
Payer: COMMERCIAL

## 2025-07-02 DIAGNOSIS — M75.41 IMPINGEMENT SYNDROME OF RIGHT SHOULDER: Primary | ICD-10-CM

## 2025-07-02 NOTE — PROGRESS NOTES
Physical Therapy Daily Treatment Note      Visit #: 3    Robina Dumont reports 0/10 pain today at rest. Pt states some motions still continue to cause her pain such as reaching behind back and turning steering wheel when driving. Pt states she did have some increased soreness after last session. Pt states she feels like she is getting better overall.    Objective Pt present to PT today with no distress at rest.    Pt having some pain and discomfort with passive stretching into flexion and abduction.    Pt performed exercises well including new introduced exercises.    Pt did report pain in left shoulder intermittently with some exercises especially when going into flexion and abduction above approximately 90 degrees.    See Exercise, Manual, and Modality Logs for complete treatment.     Assessment/Plan  Pt is progressing well with plan of care and states she is having less pain overall. Pt continues to have pain with some movements during her daily activities and continues to have ROM deficits in R shoulder. Pt will benefit from continued PT treatment to address her deficits and promote return to previous level of function.      Progress per Plan of Care      Visit Diagnosis:    ICD-10-CM ICD-9-CM   1. Impingement syndrome of right shoulder  M75.41 726.2              Timed Treatment:  Manual Therapy:    15     mins  12407;  Therapeutic Exercise:    8     mins  48843;     Neuromuscular Antonio:    15    mins  38544;    Therapeutic Activity:          mins  21556;     Gait Training:           mins  08095;     Ultrasound:          mins  74948;    Electrical Stimulation:         mins  43425 ( );  Dry Needling          mins self-pay  Iontophoresis          mins 04573    Untimed Treatments:  Electrical Stimulation:         mins  97942 ( );  Dry Needling:                     mins  Ultrasound:                         mins  04902;        Timed Treatment:   38   mins   Total Treatment:     45   mins    Pranay Marinelli  PT Student

## 2025-07-03 NOTE — PROGRESS NOTES
I have reviewed the notes, assessments, and/or procedures performed by Pranay Marinelli, I concur with her/his documentation of Robina Dumont.

## 2025-07-06 ENCOUNTER — PATIENT MESSAGE (OUTPATIENT)
Dept: NEUROLOGY | Facility: CLINIC | Age: 60
End: 2025-07-06
Payer: COMMERCIAL

## 2025-07-07 DIAGNOSIS — G47.33 OSA ON CPAP: ICD-10-CM

## 2025-07-07 DIAGNOSIS — G47.19 EXCESSIVE DAYTIME SLEEPINESS: ICD-10-CM

## 2025-07-07 RX ORDER — ARMODAFINIL 150 MG/1
150 TABLET ORAL DAILY
Qty: 30 TABLET | Refills: 1 | Status: SHIPPED | OUTPATIENT
Start: 2025-07-07 | End: 2025-07-11 | Stop reason: SDUPTHER

## 2025-07-09 ENCOUNTER — CLINICAL SUPPORT (OUTPATIENT)
Dept: INTERNAL MEDICINE | Facility: CLINIC | Age: 60
End: 2025-07-09
Payer: COMMERCIAL

## 2025-07-09 ENCOUNTER — TREATMENT (OUTPATIENT)
Dept: PHYSICAL THERAPY | Facility: CLINIC | Age: 60
End: 2025-07-09
Payer: COMMERCIAL

## 2025-07-09 DIAGNOSIS — M75.41 IMPINGEMENT SYNDROME OF RIGHT SHOULDER: Primary | ICD-10-CM

## 2025-07-09 DIAGNOSIS — M25.511 RIGHT SHOULDER PAIN, UNSPECIFIED CHRONICITY: Primary | ICD-10-CM

## 2025-07-09 PROCEDURE — 96372 THER/PROPH/DIAG INJ SC/IM: CPT | Performed by: NURSE PRACTITIONER

## 2025-07-09 PROCEDURE — 97112 NEUROMUSCULAR REEDUCATION: CPT | Performed by: PHYSICAL THERAPIST

## 2025-07-09 PROCEDURE — 97110 THERAPEUTIC EXERCISES: CPT | Performed by: PHYSICAL THERAPIST

## 2025-07-09 PROCEDURE — 97140 MANUAL THERAPY 1/> REGIONS: CPT | Performed by: PHYSICAL THERAPIST

## 2025-07-09 RX ADMIN — METHYLPREDNISOLONE ACETATE 80 MG: 80 INJECTION, SUSPENSION INTRA-ARTICULAR; INTRALESIONAL; INTRAMUSCULAR; SOFT TISSUE at 10:18

## 2025-07-09 NOTE — PROGRESS NOTES
Physical Therapy Daily Treatment Note      Visit #: 4    Robina Dumont reports 2/10 pain today at rest. Pt states she had some pain in shoulder after last session but it got better quickly. Pt states her pain is much lessened compared to before beginning PT. Pt reports that she has ortho appointment next week for her shoulder.    Objective Pt present to PT today with no distress at rest.    Pt tolerated PROM stretching well. Pt is still having some increased pain at end ranges of passive stretching into ER, Flexion, and Abduction.    Pt performed all exercises well. Pt continues to report some pain in right shoulder during exercises especially with overhead movement.    See Exercise, Manual, and Modality Logs for complete treatment.     Assessment/Plan  Pt is progressing well with plan of care. Pt does continue to have pain in her right shoulder with active movement which is causing her pain during some daily activities including driving. Pt will benefit from continued PT treatment to address her deficits and promote return to previous level of function.      Progress per Plan of Care      Visit Diagnosis:    ICD-10-CM ICD-9-CM   1. Impingement syndrome of right shoulder  M75.41 726.2              Timed Treatment:  Manual Therapy:    13     mins  75680;  Therapeutic Exercise:    10     mins  80298;     Neuromuscular Antonio:    15    mins  64025;    Therapeutic Activity:          mins  65731;     Gait Training:           mins  08053;     Ultrasound:          mins  78494;    Electrical Stimulation:         mins  74893 ( );  Dry Needling          mins self-pay  Iontophoresis          mins 19293    Untimed Treatments:  Electrical Stimulation:         mins  46243 ( );  Dry Needling:                     mins  Ultrasound:                         mins  94510;        Timed Treatment:   38   mins   Total Treatment:     45   mins    Pranay Marinelli, PT Student

## 2025-07-11 ENCOUNTER — TELEPHONE (OUTPATIENT)
Dept: NEUROLOGY | Facility: CLINIC | Age: 60
End: 2025-07-11

## 2025-07-11 DIAGNOSIS — G47.19 EXCESSIVE DAYTIME SLEEPINESS: ICD-10-CM

## 2025-07-11 DIAGNOSIS — G47.33 OSA ON CPAP: ICD-10-CM

## 2025-07-11 RX ORDER — ARMODAFINIL 150 MG/1
150 TABLET ORAL DAILY
Qty: 30 TABLET | Refills: 0 | Status: SHIPPED | OUTPATIENT
Start: 2025-07-11

## 2025-07-15 ENCOUNTER — OFFICE VISIT (OUTPATIENT)
Age: 60
End: 2025-07-15
Payer: COMMERCIAL

## 2025-07-15 VITALS
HEIGHT: 61 IN | BODY MASS INDEX: 39.04 KG/M2 | SYSTOLIC BLOOD PRESSURE: 138 MMHG | DIASTOLIC BLOOD PRESSURE: 90 MMHG | WEIGHT: 206.8 LBS

## 2025-07-15 DIAGNOSIS — M75.21 BICEPS TENDINITIS OF RIGHT UPPER EXTREMITY: ICD-10-CM

## 2025-07-15 DIAGNOSIS — M75.41 IMPINGEMENT SYNDROME OF RIGHT SHOULDER: ICD-10-CM

## 2025-07-15 DIAGNOSIS — M75.00 DIABETIC FROZEN SHOULDER ASSOCIATED WITH TYPE 2 DIABETES MELLITUS: ICD-10-CM

## 2025-07-15 DIAGNOSIS — M75.51 BURSITIS OF RIGHT SHOULDER: ICD-10-CM

## 2025-07-15 DIAGNOSIS — M75.01 ADHESIVE CAPSULITIS OF RIGHT SHOULDER: Primary | ICD-10-CM

## 2025-07-15 DIAGNOSIS — E11.618 DIABETIC FROZEN SHOULDER ASSOCIATED WITH TYPE 2 DIABETES MELLITUS: ICD-10-CM

## 2025-07-15 PROCEDURE — 3080F DIAST BP >= 90 MM HG: CPT | Performed by: ORTHOPAEDIC SURGERY

## 2025-07-15 PROCEDURE — 99212 OFFICE O/P EST SF 10 MIN: CPT | Performed by: ORTHOPAEDIC SURGERY

## 2025-07-15 PROCEDURE — 3075F SYST BP GE 130 - 139MM HG: CPT | Performed by: ORTHOPAEDIC SURGERY
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
## 2025-07-22 ENCOUNTER — TREATMENT (OUTPATIENT)
Dept: PHYSICAL THERAPY | Facility: CLINIC | Age: 60
End: 2025-07-22
Payer: COMMERCIAL

## 2025-07-22 DIAGNOSIS — M75.41 IMPINGEMENT SYNDROME OF RIGHT SHOULDER: Primary | ICD-10-CM

## 2025-07-22 PROCEDURE — 97112 NEUROMUSCULAR REEDUCATION: CPT | Performed by: PHYSICAL THERAPIST

## 2025-07-22 PROCEDURE — 97110 THERAPEUTIC EXERCISES: CPT | Performed by: PHYSICAL THERAPIST

## 2025-07-22 PROCEDURE — 97140 MANUAL THERAPY 1/> REGIONS: CPT | Performed by: PHYSICAL THERAPIST

## 2025-07-22 NOTE — PROGRESS NOTES
Physical Therapy Daily Treatment Note      Visit #: 5    Robina Dumont reports 2/10 pain today at rest.  Pt reports that the ortho doctor has released her from his care. Pt reports that her shoulder is feeling much better and moving better although still has pain with some activities.         Objective Pt present to PT today with no distress at rest.     Pt with minimal limitation in IR with tightness and pain and minimal pain with end range flex, abd, and ER of the R shoulder.     Pt with fatigue in the R shoulder with activities although no increased pain.       See Exercise, Manual, and Modality Logs for complete treatment.     Assessment/Plan  Pt continues to have mild limitation in the R shoulder mobility and decreased function due to pain and weakness. Pt to continue with PT for 2 more weeks and then discuss discharge.       Progress per Plan of Care      Visit Diagnosis:    ICD-10-CM ICD-9-CM   1. Impingement syndrome of right shoulder  M75.41 726.2              Timed Treatment:  Manual Therapy:    14     mins  86777;  Therapeutic Exercise:    20     mins  80432;     Neuromuscular Antonio:    12    mins  29190;    Therapeutic Activity:          mins  54152;     Gait Training:           mins  90057;     Ultrasound:          mins  93083;    Electrical Stimulation:         mins  30335 ( );  Dry Needling          mins self-pay  Iontophoresis          mins 34104    Untimed Treatments:  Electrical Stimulation:         mins  44046 ( );  Dry Needling:                     mins  Ultrasound:                         mins  89434;        Timed Treatment:   46   mins   Total Treatment:     46   mins    Juaquin Montero, PT  Physical Therapist

## 2025-07-23 ENCOUNTER — HOSPITAL ENCOUNTER (OUTPATIENT)
Dept: CT IMAGING | Facility: HOSPITAL | Age: 60
Discharge: HOME | End: 2025-07-23
Admitting: OTOLARYNGOLOGY
Payer: COMMERCIAL

## 2025-07-23 DIAGNOSIS — J31.2 CHRONIC PHARYNGITIS: ICD-10-CM

## 2025-07-23 DIAGNOSIS — J38.4 EDEMA OF LARYNX: ICD-10-CM

## 2025-07-23 DIAGNOSIS — R13.14 DYSPHAGIA, PHARYNGOESOPHAGEAL: ICD-10-CM

## 2025-07-23 PROCEDURE — 25510000001 IOPAMIDOL 61 % SOLUTION: Performed by: OTOLARYNGOLOGY

## 2025-07-23 PROCEDURE — 70491 CT SOFT TISSUE NECK W/DYE: CPT

## 2025-07-23 RX ORDER — IOPAMIDOL 612 MG/ML
100 INJECTION, SOLUTION INTRAVASCULAR
Status: COMPLETED | OUTPATIENT
Start: 2025-07-23 | End: 2025-07-23

## 2025-07-23 RX ADMIN — IOPAMIDOL 100 ML: 612 INJECTION, SOLUTION INTRAVENOUS at 08:22

## 2025-07-30 ENCOUNTER — TREATMENT (OUTPATIENT)
Dept: PHYSICAL THERAPY | Facility: CLINIC | Age: 60
End: 2025-07-30
Payer: COMMERCIAL

## 2025-07-30 DIAGNOSIS — M75.41 IMPINGEMENT SYNDROME OF RIGHT SHOULDER: Primary | ICD-10-CM

## 2025-07-30 NOTE — PROGRESS NOTES
Physical Therapy Daily Treatment Note      Visit #: 6    Robina Dumont reports 0/10 pain today at rest. Pt states that her shoulder feels pretty good today and feels much better than when she began PT. Pt states that she was sore after last session for a couple days, but that has been how she has been after most PT sessions so far.    Objective Pt present to PT today with no distress at rest.    Pt performed all exercises well. Pt tolerated introduction of standing scaption well but stated that it was difficult to perform.    Pt tolerated manual interventions including passive stretching and joint glides well. Pt has good passive ROM in shoulder without any pain reported.    See Exercise, Manual, and Modality Logs for complete treatment.     Assessment/Plan  Pt has progressed well with plan of care. Pt continues to have pain in right shoulder intermittently especially with more use. Pt also continues to have strength deficits in right shoulder. Pt will benefit from continued PT treatment to address her deficits and promote return to previous level of function.      Progress per Plan of Care      Visit Diagnosis:    ICD-10-CM ICD-9-CM   1. Impingement syndrome of right shoulder  M75.41 726.2              Timed Treatment:  Manual Therapy:    15     mins  91987;  Therapeutic Exercise:    15     mins  72667;     Neuromuscular Antoino:    15    mins  46351;    Therapeutic Activity:          mins  69732;     Gait Training:           mins  49939;     Ultrasound:          mins  85317;    Electrical Stimulation:         mins  06118 ( );  Dry Needling          mins self-pay  Iontophoresis          mins 77036    Untimed Treatments:  Electrical Stimulation:         mins  81779 ( );  Dry Needling:                     mins  Ultrasound:                         mins  42362;        Timed Treatment:   45   mins   Total Treatment:     55   mins    Pranay Marinelli, PT Student

## 2025-07-31 ENCOUNTER — OFFICE VISIT (OUTPATIENT)
Dept: ORTHOPEDIC SURGERY | Facility: CLINIC | Age: 60
End: 2025-07-31
Payer: COMMERCIAL

## 2025-07-31 VITALS
SYSTOLIC BLOOD PRESSURE: 142 MMHG | WEIGHT: 206 LBS | HEIGHT: 61 IN | BODY MASS INDEX: 38.89 KG/M2 | DIASTOLIC BLOOD PRESSURE: 80 MMHG

## 2025-07-31 DIAGNOSIS — Z96.642 STATUS POST TOTAL REPLACEMENT OF LEFT HIP: ICD-10-CM

## 2025-07-31 DIAGNOSIS — M76.12 PSOAS TENDINITIS, LEFT HIP: ICD-10-CM

## 2025-07-31 DIAGNOSIS — E66.09 CLASS 2 OBESITY DUE TO EXCESS CALORIES WITHOUT SERIOUS COMORBIDITY WITH BODY MASS INDEX (BMI) OF 38.0 TO 38.9 IN ADULT: ICD-10-CM

## 2025-07-31 DIAGNOSIS — M17.0 PRIMARY OSTEOARTHRITIS OF BOTH KNEES: Primary | ICD-10-CM

## 2025-07-31 DIAGNOSIS — E66.812 CLASS 2 OBESITY DUE TO EXCESS CALORIES WITHOUT SERIOUS COMORBIDITY WITH BODY MASS INDEX (BMI) OF 38.0 TO 38.9 IN ADULT: ICD-10-CM

## 2025-07-31 RX ORDER — LIDOCAINE HYDROCHLORIDE 10 MG/ML
3 INJECTION, SOLUTION EPIDURAL; INFILTRATION; INTRACAUDAL; PERINEURAL
Status: COMPLETED | OUTPATIENT
Start: 2025-07-31 | End: 2025-07-31

## 2025-07-31 RX ORDER — DEXAMETHASONE SODIUM PHOSPHATE 4 MG/ML
8 INJECTION, SOLUTION INTRA-ARTICULAR; INTRALESIONAL; INTRAMUSCULAR; INTRAVENOUS; SOFT TISSUE
Status: COMPLETED | OUTPATIENT
Start: 2025-07-31 | End: 2025-07-31

## 2025-07-31 RX ORDER — BUPIVACAINE HYDROCHLORIDE 2.5 MG/ML
3 INJECTION, SOLUTION EPIDURAL; INFILTRATION; INTRACAUDAL; PERINEURAL
Status: COMPLETED | OUTPATIENT
Start: 2025-07-31 | End: 2025-07-31

## 2025-07-31 RX ADMIN — DEXAMETHASONE SODIUM PHOSPHATE 8 MG: 4 INJECTION, SOLUTION INTRA-ARTICULAR; INTRALESIONAL; INTRAMUSCULAR; INTRAVENOUS; SOFT TISSUE at 09:48

## 2025-07-31 RX ADMIN — LIDOCAINE HYDROCHLORIDE 3 ML: 10 INJECTION, SOLUTION EPIDURAL; INFILTRATION; INTRACAUDAL; PERINEURAL at 09:48

## 2025-07-31 RX ADMIN — BUPIVACAINE HYDROCHLORIDE 3 ML: 2.5 INJECTION, SOLUTION EPIDURAL; INFILTRATION; INTRACAUDAL; PERINEURAL at 09:48
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
## 2025-07-31 NOTE — PROGRESS NOTES
Procedure   - Large Joint Arthrocentesis: bilateral knee on 7/31/2025 9:48 AM  Indications: pain  Details: 21 G needle, superolateral approach  Medications (Right): 3 mL bupivacaine (PF) 0.25 %; 3 mL lidocaine PF 1% 1 %; 8 mg dexAMETHasone 4 MG/ML  Medications (Left): 3 mL bupivacaine (PF) 0.25 %; 3 mL lidocaine PF 1% 1 %; 8 mg dexAMETHasone 4 MG/ML  Outcome: tolerated well, no immediate complications  Procedure, treatment alternatives, risks and benefits explained, specific risks discussed. Consent was given by the patient. Immediately prior to procedure a time out was called to verify the correct patient, procedure, equipment, support staff and site/side marked as required. Patient was prepped and draped in the usual sterile fashion.

## 2025-08-01 ENCOUNTER — PATIENT MESSAGE (OUTPATIENT)
Dept: INTERNAL MEDICINE | Facility: CLINIC | Age: 60
End: 2025-08-01
Payer: COMMERCIAL

## 2025-08-03 RX ORDER — MIRABEGRON 25 MG/1
25 TABLET, FILM COATED, EXTENDED RELEASE ORAL DAILY
Qty: 30 TABLET | Refills: 0 | Status: SHIPPED | OUTPATIENT
Start: 2025-08-03

## 2025-08-05 ENCOUNTER — TREATMENT (OUTPATIENT)
Dept: PHYSICAL THERAPY | Facility: CLINIC | Age: 60
End: 2025-08-05
Payer: COMMERCIAL

## 2025-08-05 DIAGNOSIS — M75.41 IMPINGEMENT SYNDROME OF RIGHT SHOULDER: Primary | ICD-10-CM

## 2025-08-05 PROCEDURE — 97112 NEUROMUSCULAR REEDUCATION: CPT | Performed by: PHYSICAL THERAPIST

## 2025-08-05 PROCEDURE — 97140 MANUAL THERAPY 1/> REGIONS: CPT | Performed by: PHYSICAL THERAPIST

## 2025-08-05 PROCEDURE — 97110 THERAPEUTIC EXERCISES: CPT | Performed by: PHYSICAL THERAPIST

## 2025-08-06 DIAGNOSIS — G47.33 OSA ON CPAP: ICD-10-CM

## 2025-08-06 DIAGNOSIS — G47.19 EXCESSIVE DAYTIME SLEEPINESS: ICD-10-CM

## 2025-08-07 RX ORDER — ARMODAFINIL 150 MG/1
150 TABLET ORAL DAILY
Qty: 30 TABLET | Refills: 0 | Status: SHIPPED | OUTPATIENT
Start: 2025-08-07

## 2025-08-17 DIAGNOSIS — M62.838 MUSCLE SPASM OF RIGHT SHOULDER: ICD-10-CM

## 2025-08-18 RX ORDER — CYCLOBENZAPRINE HCL 10 MG
10 TABLET ORAL 2 TIMES DAILY PRN
Qty: 30 TABLET | Refills: 1 | Status: SHIPPED | OUTPATIENT
Start: 2025-08-18

## 2025-08-18 RX ORDER — ALBUTEROL SULFATE 90 UG/1
2 INHALANT RESPIRATORY (INHALATION) EVERY 6 HOURS PRN
Qty: 18 G | Refills: 11 | Status: SHIPPED | OUTPATIENT
Start: 2025-08-18

## 2025-08-25 ENCOUNTER — OFFICE VISIT (OUTPATIENT)
Dept: NEUROLOGY | Facility: CLINIC | Age: 60
End: 2025-08-25
Payer: COMMERCIAL

## 2025-08-25 VITALS
BODY MASS INDEX: 38.68 KG/M2 | SYSTOLIC BLOOD PRESSURE: 110 MMHG | HEART RATE: 93 BPM | WEIGHT: 204.9 LBS | OXYGEN SATURATION: 98 % | TEMPERATURE: 98.4 F | HEIGHT: 61 IN | DIASTOLIC BLOOD PRESSURE: 80 MMHG

## 2025-08-25 DIAGNOSIS — G47.19 EXCESSIVE DAYTIME SLEEPINESS: ICD-10-CM

## 2025-08-25 DIAGNOSIS — F51.04 CHRONIC INSOMNIA: ICD-10-CM

## 2025-08-25 DIAGNOSIS — G43.719 INTRACTABLE CHRONIC MIGRAINE WITHOUT AURA AND WITHOUT STATUS MIGRAINOSUS: ICD-10-CM

## 2025-08-25 DIAGNOSIS — G47.33 OSA ON CPAP: Primary | ICD-10-CM

## 2025-08-25 PROCEDURE — 99214 OFFICE O/P EST MOD 30 MIN: CPT | Performed by: NURSE PRACTITIONER

## 2025-08-25 PROCEDURE — 3079F DIAST BP 80-89 MM HG: CPT | Performed by: NURSE PRACTITIONER

## 2025-08-25 PROCEDURE — 1160F RVW MEDS BY RX/DR IN RCRD: CPT | Performed by: NURSE PRACTITIONER

## 2025-08-25 PROCEDURE — 3074F SYST BP LT 130 MM HG: CPT | Performed by: NURSE PRACTITIONER

## 2025-08-25 PROCEDURE — 1159F MED LIST DOCD IN RCRD: CPT | Performed by: NURSE PRACTITIONER

## 2025-08-25 RX ORDER — ARMODAFINIL 150 MG/1
150 TABLET ORAL DAILY
Qty: 30 TABLET | Refills: 5 | Status: SHIPPED | OUTPATIENT
Start: 2025-08-25

## (undated) DEVICE — SYR LUERLOK 30CC

## (undated) DEVICE — COATED BRAIDED POLYESTER: Brand: TI-CRON

## (undated) DEVICE — DRSNG SURG AQUACEL AG 9X25CM

## (undated) DEVICE — PATIENT RETURN ELECTRODE, SINGLE-USE, CONTACT QUALITY MONITORING, ADULT, WITH 9FT CORD, FOR PATIENTS WEIGING OVER 33LBS. (15KG): Brand: MEGADYNE

## (undated) DEVICE — TB SXN FRAZIER 12F STRL

## (undated) DEVICE — PENCL ROCKRSWCH MEGADYNE W/HOLSTR 10FT SS

## (undated) DEVICE — SST TWIST DRILL, STANDARD, 2.4MM DIA. X 127MM: Brand: MICROAIRE®

## (undated) DEVICE — HEWSON SUTURE RETRIEVER: Brand: HEWSON SUTURE RETRIEVER

## (undated) DEVICE — BNDG ELAS CO-FLEX SLF ADHR 6IN 5YD LF STRL

## (undated) DEVICE — NEEDLE, QUINCKE 22GX3.5": Brand: MEDLINE INDUSTRIES, INC.

## (undated) DEVICE — ADHS SKIN PREMIERPRO EXOFIN TOPICAL HI/VISC .5ML

## (undated) DEVICE — BLANKT WARM UPPR/BDY ARM/OUT 57X196CM

## (undated) DEVICE — SUT MONOCRYL PLS ANTIB UND 3/0  PS1 27IN

## (undated) DEVICE — GLV SURG SENSICARE PI MIC PF SZ9 LF STRL

## (undated) DEVICE — STRYKER PERFORMANCE SERIES SAGITTAL BLADE: Brand: STRYKER PERFORMANCE SERIES

## (undated) DEVICE — STERILE PVP: Brand: MEDLINE INDUSTRIES, INC.

## (undated) DEVICE — BIT DRL 3.3X25MM DISP

## (undated) DEVICE — DRAPE,REIN 53X77,STERILE: Brand: MEDLINE

## (undated) DEVICE — ELECTRD BLD EZ CLN STD 2.5IN

## (undated) DEVICE — SUT VIC 1 CTX 36IN OBGYN VCP977H

## (undated) DEVICE — PULLOVER TOGA, 2X LARGE: Brand: FLYTE, SURGICOOL

## (undated) DEVICE — PK HIP TOTL UNIV 10

## (undated) DEVICE — INTENDED USE FOR SURGICAL MARKING ON INTACT SKIN, ALSO PROVIDES A PERMANENT METHOD OF IDENTIFYING OBJECTS IN THE OPERATING ROOM: Brand: WRITESITE® REGULAR TIP SKIN MARKER

## (undated) DEVICE — GLV SURG SENSICARE PI ORTHO SZ8 LF STRL

## (undated) DEVICE — UNDERGLV SURG BIOGEL INDICAT PI SZ8 BLU

## (undated) DEVICE — ELECTRD BLD EZ CLN STD 6.5IN

## (undated) DEVICE — PILLW ABD MD

## (undated) DEVICE — PEG PAD FOR SURG DISP

## (undated) DEVICE — ANTIBACTERIAL UNDYED BRAIDED (POLYGLACTIN 910), SYNTHETIC ABSORBABLE SUTURE: Brand: COATED VICRYL